# Patient Record
Sex: MALE | Race: BLACK OR AFRICAN AMERICAN | NOT HISPANIC OR LATINO | Employment: FULL TIME | URBAN - METROPOLITAN AREA
[De-identification: names, ages, dates, MRNs, and addresses within clinical notes are randomized per-mention and may not be internally consistent; named-entity substitution may affect disease eponyms.]

---

## 2013-05-31 LAB — HBA1C MFR BLD HPLC: 6.6 %

## 2017-02-16 LAB — HBA1C MFR BLD HPLC: 7.4 %

## 2017-06-15 LAB — HBA1C MFR BLD HPLC: 7.6 %

## 2018-01-10 NOTE — RESULT NOTES
Message    Colon polyp removed came back as tubular adenoma  Repeat colonoscopy in 3 years      LMOM for pt to call the office for results/reminder set  CF         Verified Results  (1) TISSUE EXAM [id number] 10:05AM Jordana Tsang     Test Name Result Flag Reference   LAB AP CASE REPORT (Report)     Surgical Pathology Report             Case: [id number]                   Authorizing Provider: Papo Adams MD     Collected:      07/19/2016 1005        Ordering Location:   [facility]    Received:      07/19/2016 [facility]                               Pathologist:      Jose Enrique Jefferson MD                             Specimen:  Polyp, Colorectal, hot snare hepatic flexure polyp   LAB AP FINAL DIAGNOSIS      A  Colon, hepatic flexure polyp, biopsy:  - Tubular adenoma, negative for high-grade dysplasia  Electronically signed by Jose Enrique Jefferson MD on 7/22/2016 at 2:48 PM   LAB AP SURGICAL ADDITIONAL INFORMATION (Report)     These tests were developed and their performance characteristics   determined by [facility] or [facility]  They may not be cleared or approved by the [place]  Food and   Drug Administration  The FDA has determined that such clearance or   approval is not necessary  These tests are used for clinical purposes  They should not be regarded as investigational or for research  This   laboratory has been approved by CLIA 88, designated as a high-complexity   laboratory and is qualified to perform these tests  Interpretation performed at [facility]   LAB AP GROSS DESCRIPTION (Report)     A  The specimen is received in formalin, labeled with the patient's name   and hospital number, and is designated hepatic flexure polyp  The   specimen consists of a single, rubbery, tan-brown tissue fragment   measuring up to 0 2 cm in greatest dimension  Entirely submitted   One cassette  Note: The estimated total formalin fixation time based upon information   provided by the submitting clinician and the standard processing schedule   is 18 5 hours      Sun Cochran

## 2018-01-16 NOTE — MISCELLANEOUS
Message  GI Reminder Recall [facility]:   Date: 04/04/2016   Dear Terrell Garcia:     Review of our records shows you are due for the following: Colonoscopy  Please call the following office to schedule your appointment:   [address] [facility], [address], [place], [address] [place] [address]  [phone]  We look forward to hearing from you! Sincerely,         Signatures   Electronically signed by :  Michael Ceja, ; Apr 4 2016  4:41PM EST                       (Author)

## 2018-09-07 ENCOUNTER — OFFICE VISIT (OUTPATIENT)
Dept: HEMATOLOGY ONCOLOGY | Facility: CLINIC | Age: 58
End: 2018-09-07
Payer: COMMERCIAL

## 2018-09-07 VITALS
DIASTOLIC BLOOD PRESSURE: 82 MMHG | HEIGHT: 66 IN | RESPIRATION RATE: 16 BRPM | BODY MASS INDEX: 32.78 KG/M2 | HEART RATE: 87 BPM | WEIGHT: 204 LBS | SYSTOLIC BLOOD PRESSURE: 144 MMHG | TEMPERATURE: 97.2 F | OXYGEN SATURATION: 96 %

## 2018-09-07 DIAGNOSIS — D3A.00 CARCINOID TUMOR: Primary | ICD-10-CM

## 2018-09-07 PROCEDURE — 99205 OFFICE O/P NEW HI 60 MIN: CPT | Performed by: INTERNAL MEDICINE

## 2018-09-07 RX ORDER — MULTIVIT-MIN/IRON/FOLIC ACID/K 18-600-40
1 CAPSULE ORAL DAILY
COMMUNITY

## 2018-09-07 RX ORDER — MULTIVITAMIN
1 TABLET ORAL DAILY
COMMUNITY

## 2018-09-07 RX ORDER — DIMENHYDRINATE 50 MG
2 TABLET ORAL DAILY
COMMUNITY

## 2018-09-07 RX ORDER — PEN NEEDLE, DIABETIC 31 GX5/16"
NEEDLE, DISPOSABLE MISCELLANEOUS
Refills: 5 | COMMUNITY
Start: 2018-07-28

## 2018-09-07 NOTE — PROGRESS NOTES
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  A Ki-67 was not given in the report  Regardless he has done well since then  In 2015 he had imaging which showed a questionable liver lesion which on repeat imaging was thought to possibly have represented a hemangioma as noted lesions were seen per the report  He has had a colonoscopy every 3 years and was getting a chromogranin A once a year with liver function tests  Denies any nausea denies any vomiting denies any diarrhea  He denies any evidence of carcinoid syndrome for symptoms as he has none  States occasionally every 3 months or so he sometimes has diarrhea usually just 1 episode at night which may be related to what he ate  Otherwise his 14 point review of systems today was negative  ROS: As stated in history of present illness otherwise her 14 point review of systems today was negative  Active Problems: Myasthenia Gravis, diabetes    Past Medical History:   Past Medical History:   Diagnosis Date    Carcinoid tumor of colon     CPAP (continuous positive airway pressure) dependence     Cyst of skin and subcutaneous tissue     back    Diabetes mellitus ([address] )     Hyperlipidemia     Hypertension     Myasthenia gravis ([address] )     Sleep apnea        Surgical History:   Past Surgical History:   Procedure Laterality Date    DENTAL SURGERY      NASAL POLYP EXCISION      WY COLONOSCOPY FLX DX W/COLLJ SPEC WHEN PFRMD N/A 7/19/2016    Procedure: COLONOSCOPY;  Surgeon: Leann Silva MD;  [facility];   Service: Gastroenterology Family History:  Noncontributory      Social History:   Social History     Social History    Marital status: /Civil Union     Spouse name: N/A    Number of children: N/A    Years of education: N/A     Occupational History    Not on file  Social History Main Topics    Smoking status: Never Smoker    Smokeless tobacco: Not on file    Alcohol use Yes      Comment: 1 per week    Drug use: Unknown    Sexual activity: Not on file     Other Topics Concern    Not on file     Social History Narrative    No narrative on file       Current Medications:   Current Outpatient Prescriptions   Medication Sig Dispense Refill    amLODIPine (NORVASC) 10 mg tablet Take 10 mg by mouth daily   Ascorbic Acid (VITAMIN C) 500 MG CAPS Take 1 tablet by mouth daily      aspirin 81 MG tablet Take 81 mg by mouth daily   atorvastatin (LIPITOR) 20 mg tablet Take 20 mg by mouth daily   B-D UF III MINI PEN NEEDLES 31G X 5 MM MISC USE 1 DAILY  5    Cholecalciferol ([employer]S VITAMIN D) 2000 units CAPS Take 2 tablets by mouth 2 (two) times a day      Cinnamon 500 MG TABS Take by mouth      coenzyme Q-10 100 MG capsule Take 2 capsules by mouth daily      glipiZIDE (GLUCOTROL) 10 mg tablet Take 10 mg by mouth 2 (two) times a day before meals   hydrochlorothiazide (HYDRODIURIL) 25 mg tablet Take 25 mg by mouth daily   insulin glargine (LANTUS) 100 units/mL subcutaneous injection Inject 60 Units under the skin daily Indications: 20 units 7/18 am         irbesartan (AVAPRO) 300 mg tablet Take 300 mg by mouth daily at bedtime   metFORMIN (GLUMETZA) 500 MG (MOD) 24 hr tablet Take 750 mg by mouth 2 (two) times a day with meals   metoprolol succinate (TOPROL-XL) 100 mg 24 hr tablet Take 100 mg by mouth daily   miglitol (GLYSET) 100 MG tablet Take 100 mg by mouth 3 (three) times a day with meals        Multiple Vitamin (MULTI-VITAMIN DAILY) TABS Take 1 tablet by mouth daily      pyridostigmine (MESTINON) 60 mg tablet Take 60 mg by mouth 3 (three) times a day  No current facility-administered medications for this visit  Allergies: No Known Allergies      Physical Exam:    Body surface area is 2 01 meters squared  Wt Readings from Last 3 Encounters:   09/07/18 92 5 kg (204 lb)   07/19/16 91 kg (200 lb 9 9 oz)   06/29/16 91 9 kg (202 lb 8 oz)        Temp Readings from Last 3 Encounters:   09/07/18 (!) 97 2 °F (36 2 °C) (Tympanic)   07/19/16 (!) 97 °F (36 1 °C)        BP Readings from Last 3 Encounters:   09/07/18 144/82   07/19/16 135/76   06/29/16 158/88         Pulse Readings from Last 3 Encounters:   09/07/18 87   07/19/16 93   06/29/16 78       Physical Exam     Constitutional   General appearance: No acute distress, well appearing and well nourished  Eyes   Conjunctiva and lids: No swelling, erythema or discharge  Pupils and irises: Equal, round and reactive to light  Ears, Nose, Mouth, and Throat   External inspection of ears and nose: Normal     Nasal mucosa, septum, and turbinates: Normal without edema or erythema  Oropharynx: Normal with no erythema, edema, exudate or lesions  Pulmonary   Respiratory effort: No increased work of breathing or signs of respiratory distress  Auscultation of lungs: Clear to auscultation  Cardiovascular   Palpation of heart: Normal PMI, no thrills  Auscultation of heart: Normal rate and rhythm, normal S1 and S2, without murmurs  Examination of extremities for edema and/or varicosities: Normal     Carotid pulses: Normal     Abdomen   Abdomen: Non-tender, no masses  Liver and spleen: No hepatomegaly or splenomegaly  Lymphatic   Palpation of lymph nodes in neck: No lymphadenopathy  Musculoskeletal   Gait and station: Normal     Digits and nails: Normal without clubbing or cyanosis      Inspection/palpation of joints, bones, and muscles: Normal     Skin   Skin and subcutaneous tissue: Normal without rashes or lesions  Neurologic   Cranial nerves: Cranial nerves 2-12 intact  Sensation: No sensory loss  Psychiatric   Orientation to person, place, and time: Normal     Mood and affect: Normal           Assessment/ Plan:    The patient is a pleasant 77-year-old male with a past medical history of carcinoid found in a rectal polyp in 2011  He has been CAROL since  I spent them is cured of this disease at this point  Because of the very small risk of metastatic disease she wishes to be followed once a year which I think is reasonable  Since he has no symptoms I am not ordering any imaging at this time  I'll see him back in a year  He will do blood work within the next week or 2 as his last blood work was a year ago  I will then see him back in a year with repeat blood work  All questions were answered  The patient agrees with the plan  Goals and Barriers:  Current Goal: Prolong Survival from Carcinoid Of the rectum  Barriers: None  Patient's Capacity to Self Care:  Patient able to self care  Portions of the record may have been created with voice recognition software   Occasional wrong word or "sound a like" substitutions may have occurred due to the inherent limitations of voice recognition software   Read the chart carefully and recognize, using context, where substitutions have occurred

## 2018-09-07 NOTE — LETTER
September 7, 2018     Heather Gonzalez MD  [address]  [address] [place] [address]    Patient: Evette Landeros [facility]   YOB: 1960   Date of Visit: 9/7/2018       Dear Dr Ryann Pollack: Thank you for referring Alvarez Price to me for evaluation  Below are my notes for this consultation  If you have questions, please do not hesitate to call me  I look forward to following your patient along with you  Sincerely,        Charu Daigle MD        CC: No Recipients  Charu Daigle MD  9/7/2018  3:04 PM  Sign at close encounter     Oncology Outpatient Consult Note  Anders Mcconnell 62 y o  male MRN: @ Encounter: [id number]        Date:  9/7/2018        CC:  Carcinoid tumor of the rectum  HPI:  Anders Mcconnell is seen for initial consultation 9/7/2018 regarding History of a carcinoid removed in 2011  Your colonoscopy at the time and was found to have a small polyp  Pathology from [facility] revealed carcinoid tumor  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  A Ki-67 was not given in the report  Regardless he has done well since then  In 2015 he had imaging which showed a questionable liver lesion which on repeat imaging was thought to possibly have represented a hemangioma as noted lesions were seen per the report  He has had a colonoscopy every 3 years and was getting a chromogranin A once a year with liver function tests  Denies any nausea denies any vomiting denies any diarrhea  He denies any evidence of carcinoid syndrome for symptoms as he has none  States occasionally every 3 months or so he sometimes has diarrhea usually just 1 episode at night which may be related to what he ate  Otherwise his 14 point review of systems today was negative  ROS: As stated in history of present illness otherwise her 14 point review of systems today was negative          Active Problems: Myasthenia Gravis, diabetes    Past Medical History:   Past Medical History:   Diagnosis Date    Carcinoid tumor of colon     CPAP (continuous positive airway pressure) dependence     Cyst of skin and subcutaneous tissue     back    Diabetes mellitus [address] [address]     Hyperlipidemia     Hypertension     Myasthenia gravis ([facility] [facility] )     Sleep apnea        Surgical History:   Past Surgical History:   Procedure Laterality Date    DENTAL SURGERY      NASAL POLYP EXCISION      WY COLONOSCOPY FLX DX W/COLLJ SPEC WHEN PFRMD N/A 7/19/2016    Procedure: COLONOSCOPY;  Surgeon: Tapan Leach MD;  [facility]; Service: Gastroenterology       Family History:  Noncontributory      Social History:   Social History     Social History    Marital status: /Civil Union     Spouse name: N/A    Number of children: N/A    Years of education: N/A     Occupational History    Not on file  Social History Main Topics    Smoking status: Never Smoker    Smokeless tobacco: Not on file    Alcohol use Yes      Comment: 1 per week    Drug use: Unknown    Sexual activity: Not on file     Other Topics Concern    Not on file     Social History Narrative    No narrative on file       Current Medications:   Current Outpatient Prescriptions   Medication Sig Dispense Refill    amLODIPine (NORVASC) 10 mg tablet Take 10 mg by mouth daily   Ascorbic Acid (VITAMIN C) 500 MG CAPS Take 1 tablet by mouth daily      aspirin 81 MG tablet Take 81 mg by mouth daily   atorvastatin (LIPITOR) 20 mg tablet Take 20 mg by mouth daily   B-D UF III MINI PEN NEEDLES 31G X 5 MM MISC USE 1 DAILY  5    Cholecalciferol ([employer]S VITAMIN D) 2000 units CAPS Take 2 tablets by mouth 2 (two) times a day      Cinnamon 500 MG TABS Take by mouth      coenzyme Q-10 100 MG capsule Take 2 capsules by mouth daily      glipiZIDE (GLUCOTROL) 10 mg tablet Take 10 mg by mouth 2 (two) times a day before meals   hydrochlorothiazide (HYDRODIURIL) 25 mg tablet Take 25 mg by mouth daily        insulin glargine (LANTUS) 100 units/mL subcutaneous injection Inject 60 Units under the skin daily Indications: 20 units 7/18 am         irbesartan (AVAPRO) 300 mg tablet Take 300 mg by mouth daily at bedtime   metFORMIN (GLUMETZA) 500 MG (MOD) 24 hr tablet Take 750 mg by mouth 2 (two) times a day with meals   metoprolol succinate (TOPROL-XL) 100 mg 24 hr tablet Take 100 mg by mouth daily   miglitol (GLYSET) 100 MG tablet Take 100 mg by mouth 3 (three) times a day with meals   Multiple Vitamin (MULTI-VITAMIN DAILY) TABS Take 1 tablet by mouth daily      pyridostigmine (MESTINON) 60 mg tablet Take 60 mg by mouth 3 (three) times a day  No current facility-administered medications for this visit  Allergies: No Known Allergies      Physical Exam:    Body surface area is 2 01 meters squared  Wt Readings from Last 3 Encounters:   09/07/18 92 5 kg (204 lb)   07/19/16 91 kg (200 lb 9 9 oz)   06/29/16 91 9 kg (202 lb 8 oz)        Temp Readings from Last 3 Encounters:   09/07/18 (!) 97 2 °F (36 2 °C) (Tympanic)   07/19/16 (!) 97 °F (36 1 °C)        BP Readings from Last 3 Encounters:   09/07/18 144/82   07/19/16 135/76   06/29/16 158/88         Pulse Readings from Last 3 Encounters:   09/07/18 87   07/19/16 93   06/29/16 78       Physical Exam     Constitutional   General appearance: No acute distress, well appearing and well nourished  Eyes   Conjunctiva and lids: No swelling, erythema or discharge  Pupils and irises: Equal, round and reactive to light  Ears, Nose, Mouth, and Throat   External inspection of ears and nose: Normal     Nasal mucosa, septum, and turbinates: Normal without edema or erythema  Oropharynx: Normal with no erythema, edema, exudate or lesions  Pulmonary   Respiratory effort: No increased work of breathing or signs of respiratory distress  Auscultation of lungs: Clear to auscultation  Cardiovascular   Palpation of heart: Normal PMI, no thrills      Auscultation of heart: Normal rate and rhythm, normal S1 and S2, without murmurs  Examination of extremities for edema and/or varicosities: Normal     Carotid pulses: Normal     Abdomen   Abdomen: Non-tender, no masses  Liver and spleen: No hepatomegaly or splenomegaly  Lymphatic   Palpation of lymph nodes in neck: No lymphadenopathy  Musculoskeletal   Gait and station: Normal     Digits and nails: Normal without clubbing or cyanosis  Inspection/palpation of joints, bones, and muscles: Normal     Skin   Skin and subcutaneous tissue: Normal without rashes or lesions  Neurologic   Cranial nerves: Cranial nerves 2-12 intact  Sensation: No sensory loss  Psychiatric   Orientation to person, place, and time: Normal     Mood and affect: Normal           Assessment/ Plan:    The patient is a pleasant 51-year-old male with a past medical history of carcinoid found in a rectal polyp in 2011  He has been CAROL since  I spent them is cured of this disease at this point  Because of the very small risk of metastatic disease she wishes to be followed once a year which I think is reasonable  Since he has no symptoms I am not ordering any imaging at this time  I'll see him back in a year  He will do blood work within the next week or 2 as his last blood work was a year ago  I will then see him back in a year with repeat blood work  All questions were answered  The patient agrees with the plan  Goals and Barriers:  Current Goal: Prolong Survival from Carcinoid Of the rectum  Barriers: None  Patient's Capacity to Self Care:  Patient able to self care  Portions of the record may have been created with voice recognition software   Occasional wrong word or "sound a like" substitutions may have occurred due to the inherent limitations of voice recognition software   Read the chart carefully and recognize, using context, where substitutions have occurred

## 2018-09-19 LAB
ALBUMIN SERPL-MCNC: 4.2 G/DL (ref 3.5–5.5)
ALBUMIN/GLOB SERPL: 1.6 {RATIO} (ref 1.2–2.2)
ALP SERPL-CCNC: 96 IU/L (ref 39–117)
ALT SERPL-CCNC: 14 IU/L (ref 0–44)
AMBIG ABBREV DEFAULT: NORMAL
AST SERPL-CCNC: 16 IU/L (ref 0–40)
BASOPHILS # BLD AUTO: 0 X10E3/UL (ref 0–0.2)
BASOPHILS NFR BLD AUTO: 0 %
BILIRUB SERPL-MCNC: 0.4 MG/DL (ref 0–1.2)
BUN SERPL-MCNC: 14 MG/DL (ref 6–24)
BUN/CREAT SERPL: 12 (ref 9–20)
CALCIUM SERPL-MCNC: 9.6 MG/DL (ref 8.7–10.2)
CGA SERPL-SCNC: 2 NMOL/L (ref 0–5)
CHLORIDE SERPL-SCNC: 101 MMOL/L (ref 96–106)
CO2 SERPL-SCNC: 27 MMOL/L (ref 20–29)
CREAT SERPL-MCNC: 1.16 MG/DL (ref 0.76–1.27)
EOSINOPHIL # BLD AUTO: 0.2 X10E3/UL (ref 0–0.4)
EOSINOPHIL NFR BLD AUTO: 3 %
ERYTHROCYTE [DISTWIDTH] IN BLOOD BY AUTOMATED COUNT: 14.1 % (ref 12.3–15.4)
GLOBULIN SER-MCNC: 2.7 G/DL (ref 1.5–4.5)
GLUCOSE SERPL-MCNC: 185 MG/DL (ref 65–99)
HCT VFR BLD AUTO: 38.5 % (ref 37.5–51)
HGB BLD-MCNC: 12.5 G/DL (ref 13–17.7)
IMM GRANULOCYTES # BLD: 0 X10E3/UL (ref 0–0.1)
IMM GRANULOCYTES NFR BLD: 0 %
LYMPHOCYTES # BLD AUTO: 1.4 X10E3/UL (ref 0.7–3.1)
LYMPHOCYTES NFR BLD AUTO: 28 %
MCH RBC QN AUTO: 25.5 PG (ref 26.6–33)
MCHC RBC AUTO-ENTMCNC: 32.5 G/DL (ref 31.5–35.7)
MCV RBC AUTO: 79 FL (ref 79–97)
MONOCYTES # BLD AUTO: 0.5 X10E3/UL (ref 0.1–0.9)
MONOCYTES NFR BLD AUTO: 9 %
NEUTROPHILS # BLD AUTO: 3.1 X10E3/UL (ref 1.4–7)
NEUTROPHILS NFR BLD AUTO: 60 %
PLATELET # BLD AUTO: 250 X10E3/UL (ref 150–379)
POTASSIUM SERPL-SCNC: 4.1 MMOL/L (ref 3.5–5.2)
PROT SERPL-MCNC: 6.9 G/DL (ref 6–8.5)
RBC # BLD AUTO: 4.9 X10E6/UL (ref 4.14–5.8)
SL AMB EGFR AFRICAN AMERICAN: 80 ML/MIN/1.73
SL AMB EGFR NON AFRICAN AMERICAN: 69 ML/MIN/1.73
SODIUM SERPL-SCNC: 143 MMOL/L (ref 134–144)
WBC # BLD AUTO: 5.2 X10E3/UL (ref 3.4–10.8)

## 2019-01-31 ENCOUNTER — TELEPHONE (OUTPATIENT)
Dept: GASTROENTEROLOGY | Facility: CLINIC | Age: 59
End: 2019-01-31

## 2019-01-31 NOTE — TELEPHONE ENCOUNTER
Ivet Baker patient      Karen Kennedy from dr neves office would like a call back to see when he is due for next colon appt    [phone]

## 2019-07-12 ENCOUNTER — TELEPHONE (OUTPATIENT)
Dept: GASTROENTEROLOGY | Facility: AMBULARY SURGERY CENTER | Age: 59
End: 2019-07-12

## 2019-07-12 ENCOUNTER — TELEPHONE (OUTPATIENT)
Dept: HEMATOLOGY ONCOLOGY | Facility: CLINIC | Age: 59
End: 2019-07-12

## 2019-07-12 NOTE — TELEPHONE ENCOUNTER
Patient called to reschedule appointment  Patient was rescheduled from 9/19 to 9/12 at 2 pm  Verbalized understanding appointment details   Call back [phone]

## 2019-09-09 LAB
ALBUMIN SERPL-MCNC: 4.8 G/DL (ref 3.5–5.5)
ALBUMIN/GLOB SERPL: 2.1 {RATIO} (ref 1.2–2.2)
ALP SERPL-CCNC: 62 IU/L (ref 39–117)
ALT SERPL-CCNC: 15 IU/L (ref 0–44)
AST SERPL-CCNC: 16 IU/L (ref 0–40)
BASOPHILS # BLD AUTO: 0 X10E3/UL (ref 0–0.2)
BASOPHILS NFR BLD AUTO: 0 %
BILIRUB SERPL-MCNC: 0.6 MG/DL (ref 0–1.2)
BUN SERPL-MCNC: 12 MG/DL (ref 6–24)
BUN/CREAT SERPL: 11 (ref 9–20)
CALCIUM SERPL-MCNC: 9.9 MG/DL (ref 8.7–10.2)
CGA SERPL-SCNC: 2 NMOL/L (ref 0–5)
CHLORIDE SERPL-SCNC: 100 MMOL/L (ref 96–106)
CO2 SERPL-SCNC: 29 MMOL/L (ref 20–29)
CREAT SERPL-MCNC: 1.12 MG/DL (ref 0.76–1.27)
EOSINOPHIL # BLD AUTO: 0.1 X10E3/UL (ref 0–0.4)
EOSINOPHIL NFR BLD AUTO: 2 %
ERYTHROCYTE [DISTWIDTH] IN BLOOD BY AUTOMATED COUNT: 14.8 % (ref 12.3–15.4)
GLOBULIN SER-MCNC: 2.3 G/DL (ref 1.5–4.5)
GLUCOSE SERPL-MCNC: 85 MG/DL (ref 65–99)
HCT VFR BLD AUTO: 37.3 % (ref 37.5–51)
HGB BLD-MCNC: 12.2 G/DL (ref 13–17.7)
IMM GRANULOCYTES # BLD: 0 X10E3/UL (ref 0–0.1)
IMM GRANULOCYTES NFR BLD: 0 %
LYMPHOCYTES # BLD AUTO: 1.2 X10E3/UL (ref 0.7–3.1)
LYMPHOCYTES NFR BLD AUTO: 21 %
MCH RBC QN AUTO: 24.6 PG (ref 26.6–33)
MCHC RBC AUTO-ENTMCNC: 32.7 G/DL (ref 31.5–35.7)
MCV RBC AUTO: 75 FL (ref 79–97)
MONOCYTES # BLD AUTO: 0.4 X10E3/UL (ref 0.1–0.9)
MONOCYTES NFR BLD AUTO: 8 %
NEUTROPHILS # BLD AUTO: 3.9 X10E3/UL (ref 1.4–7)
NEUTROPHILS NFR BLD AUTO: 69 %
PLATELET # BLD AUTO: 266 X10E3/UL (ref 150–450)
POTASSIUM SERPL-SCNC: 3.9 MMOL/L (ref 3.5–5.2)
PROT SERPL-MCNC: 7.1 G/DL (ref 6–8.5)
RBC # BLD AUTO: 4.95 X10E6/UL (ref 4.14–5.8)
SL AMB EGFR AFRICAN AMERICAN: 83 ML/MIN/1.73
SL AMB EGFR NON AFRICAN AMERICAN: 72 ML/MIN/1.73
SODIUM SERPL-SCNC: 146 MMOL/L (ref 134–144)
WBC # BLD AUTO: 5.6 X10E3/UL (ref 3.4–10.8)

## 2019-09-10 ENCOUNTER — TELEPHONE (OUTPATIENT)
Dept: HEMATOLOGY ONCOLOGY | Facility: CLINIC | Age: 59
End: 2019-09-10

## 2019-09-10 NOTE — TELEPHONE ENCOUNTER
PT CALLED FOR DIRECTIONS TO GIORGI FOR HIS APT WITH DR WRAY   BUT I DON'T SEE AN APT IN HIS CHART   LOOKS LIKE IT WAS CANCELED BY YOU    CAN YOU PLEASE CHECK AND GIVE PT A CALL IS NECESSARY    THANKS

## 2019-09-12 ENCOUNTER — OFFICE VISIT (OUTPATIENT)
Dept: HEMATOLOGY ONCOLOGY | Facility: CLINIC | Age: 59
End: 2019-09-12
Payer: COMMERCIAL

## 2019-09-12 VITALS
BODY MASS INDEX: 34.16 KG/M2 | HEART RATE: 76 BPM | DIASTOLIC BLOOD PRESSURE: 68 MMHG | SYSTOLIC BLOOD PRESSURE: 138 MMHG | OXYGEN SATURATION: 98 % | HEIGHT: 65 IN | RESPIRATION RATE: 16 BRPM | TEMPERATURE: 96.1 F | WEIGHT: 205 LBS

## 2019-09-12 DIAGNOSIS — D3A.00 CARCINOID TUMOR: ICD-10-CM

## 2019-09-12 DIAGNOSIS — D50.9 MICROCYTIC ANEMIA: Primary | ICD-10-CM

## 2019-09-12 PROCEDURE — 99213 OFFICE O/P EST LOW 20 MIN: CPT | Performed by: NURSE PRACTITIONER

## 2019-09-12 RX ORDER — METFORMIN HYDROCHLORIDE EXTENDED-RELEASE TABLETS 1000 MG/1
1000 TABLET, FILM COATED, EXTENDED RELEASE ORAL 2 TIMES DAILY
Refills: 2 | COMMUNITY
Start: 2019-08-26 | End: 2021-02-22 | Stop reason: CLARIF

## 2019-09-12 RX ORDER — SITAGLIPTIN 100 MG/1
100 TABLET, FILM COATED ORAL DAILY
Refills: 2 | COMMUNITY
Start: 2019-06-26

## 2019-09-12 RX ORDER — ATORVASTATIN CALCIUM 40 MG/1
40 TABLET, FILM COATED ORAL
Refills: 4 | COMMUNITY
Start: 2019-07-24

## 2019-09-12 RX ORDER — ICOSAPENT ETHYL 1000 MG/1
CAPSULE ORAL
Refills: 2 | COMMUNITY
Start: 2019-06-26 | End: 2022-07-05

## 2019-09-12 NOTE — PROGRESS NOTES
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  A Ki-67 was not given in the report  Regardless he has done well since then  In 2015 he had imaging which showed a questionable liver lesion which on repeat imaging was thought to possibly have represented a hemangioma as noted lesions were seen per the report  He has had a colonoscopy every 3 years and was getting a chromogranin A once a year with liver function tests  He has been CAROL since  Current Hematologic/ Oncologic Treatment:    Observation  Oral iron    Interval History:    The patient returns for yearly follow-up visit  He has been CAROL since 2011  At this time his blood work remains stable  Chromogranin a is 2  WBC 5 6, hemoglobin 12 2, MCV 75, platelets 354, ANC 3 9  CMP is within normal limits  Overall he states that he is feeling well  Denies fatigue, chest pain, shortness of breath, nausea, vomiting, diarrhea, bleeding/bruising, and fevers/infection  Denies any bloody stools  Test Results:    Imaging: No results found      Labs:   Lab Results   Component Value Date    WBC 5 6 09/05/2019    HGB 12 2 (L) 09/05/2019    HCT 37 3 (L) 09/05/2019    MCV 75 (L) 09/05/2019     09/05/2019     Lab Results   Component Value Date    K 3 9 09/05/2019     09/05/2019    CO2 29 09/05/2019    BUN 12 09/05/2019    CREATININE 1 12 09/05/2019    AST 16 09/05/2019    ALT 15 09/05/2019         No results found for: SPEP, UPEP    No results found for: PSA    No results found for: CEA    No results found for:     No results found for: AFP    No results found for: IRON, TIBC, FERRITIN    No results found for: [id number]      ROS: As stated in the history of present illness otherwise his 14 point review of systems today was negative  Active Problems: There is no problem list on file for this patient  Past Medical History:   Past Medical History:   Diagnosis Date    Carcinoid tumor of colon     CPAP (continuous positive airway pressure) dependence     Cyst of skin and subcutaneous tissue     back    Diabetes mellitus [facility] [address]     Hyperlipidemia     Hypertension     Myasthenia gravis ([facility] [address] [facility]     Sleep apnea        Surgical History:   Past Surgical History:   Procedure Laterality Date    DENTAL SURGERY      NASAL POLYP EXCISION      [place] COLONOSCOPY FLX DX W/COLLJ SPEC WHEN PFRMD N/A 7/19/2016    Procedure: COLONOSCOPY;  Surgeon: Jean Carlos Duckwotrh MD;  Location: [facility]; Service: Gastroenterology       Family History:  No family history on file  Cancer-related family history is not on file      Social History:   Social History     Socioeconomic History    Marital status: /Civil Union     Spouse name: Not on file    Number of children: Not on file    Years of education: Not on file    Highest education level: Not on file   Occupational History    Not on file   Social Needs    Financial resource strain: Not on file    Food insecurity:     Worry: Not on file     Inability: Not on file    Transportation needs:     Medical: Not on file     Non-medical: Not on file   Tobacco Use    Smoking status: Never Smoker   Substance and Sexual Activity    Alcohol use: Yes     Comment: 1 per week    Drug use: Not on file    Sexual activity: Not on file   Lifestyle    Physical activity:     Days per week: Not on file     Minutes per session: Not on file    Stress: Not on file   Relationships    Social connections:     Talks on phone: Not on file     Gets together: Not on file     Attends Confucianist service: Not on file     Active member of club or organization: Not on file     Attends meetings of clubs or organizations: Not on file     Relationship status: Not on file    Intimate partner violence:     Fear of current or ex partner: Not on file     Emotionally abused: Not on file     Physically abused: Not on file     Forced sexual activity: Not on file   Other Topics Concern    Not on file   Social History Narrative    Not on file       Current Medications:   Current Outpatient Medications   Medication Sig Dispense Refill    amLODIPine (NORVASC) 10 mg tablet Take 10 mg by mouth daily   Ascorbic Acid (VITAMIN C) 500 MG CAPS Take 1 tablet by mouth daily      aspirin 81 MG tablet Take 81 mg by mouth daily   atorvastatin (LIPITOR) 20 mg tablet Take 20 mg by mouth daily   B-D UF III MINI PEN NEEDLES 31G X 5 MM MISC USE 1 DAILY  5    Cholecalciferol ([employer] VITAMIN D) 2000 units CAPS Take 2 tablets by mouth 2 (two) times a day      Cinnamon 500 MG TABS Take by mouth      coenzyme Q-10 100 MG capsule Take 2 capsules by mouth daily      glipiZIDE (GLUCOTROL) 10 mg tablet Take 10 mg by mouth 2 (two) times a day before meals   hydrochlorothiazide (HYDRODIURIL) 25 mg tablet Take 25 mg by mouth daily   insulin glargine (LANTUS) 100 units/mL subcutaneous injection Inject 60 Units under the skin daily Indications: 20 units 7/18 am         irbesartan (AVAPRO) 300 mg tablet Take 300 mg by mouth daily at bedtime   metFORMIN (GLUMETZA) 500 MG (MOD) 24 hr tablet Take 750 mg by mouth 2 (two) times a day with meals   metoprolol succinate (TOPROL-XL) 100 mg 24 hr tablet Take 100 mg by mouth daily   miglitol (GLYSET) 100 MG tablet Take 100 mg by mouth 3 (three) times a day with meals   Multiple Vitamin (MULTI-VITAMIN DAILY) TABS Take 1 tablet by mouth daily      pyridostigmine (MESTINON) 60 mg tablet Take 60 mg by mouth 3 (three) times a day         No current facility-administered medications for this visit  Allergies: No Known Allergies    Physical Exam:    There is no height or weight on file to calculate BSA  Wt Readings from Last 3 Encounters:   09/07/18 92 5 kg (204 lb)   07/19/16 91 kg (200 lb 9 9 oz)   06/29/16 91 9 kg (202 lb 8 oz)        Temp Readings from Last 3 Encounters:   09/07/18 (!) 97 2 °F (36 2 °C) (Tympanic)   07/19/16 (!) 97 °F (36 1 °C)        BP Readings from Last 3 Encounters:   09/07/18 144/82   07/19/16 135/76   06/29/16 158/88         Pulse Readings from Last 3 Encounters:   09/07/18 87   07/19/16 93   06/29/16 78     @LASTSAO2(3)@      Physical Exam     Constitutional   General appearance: No acute distress, well appearing and well nourished  Eyes   Conjunctiva and lids: No swelling, erythema or discharge  Pupils and irises: Equal, round and reactive to light  Ears, Nose, Mouth, and Throat   External inspection of ears and nose: Normal     Nasal mucosa, septum, and turbinates: Normal without edema or erythema  Oropharynx: Normal with no erythema, edema, exudate or lesions  Pulmonary   Respiratory effort: No increased work of breathing or signs of respiratory distress  Auscultation of lungs: Clear to auscultation  Cardiovascular   Palpation of heart: Normal PMI, no thrills  Auscultation of heart: Normal rate and rhythm, normal S1 and S2, without murmurs  Examination of extremities for edema and/or varicosities: Normal     Carotid pulses: Normal     Abdomen   Abdomen: Non-tender, no masses  Liver and spleen: No hepatomegaly or splenomegaly  Lymphatic   Palpation of lymph nodes in neck: No lymphadenopathy  Musculoskeletal   Gait and station: Normal     Digits and nails: Normal without clubbing or cyanosis  Inspection/palpation of joints, bones, and muscles: Normal     Skin   Skin and subcutaneous tissue: Normal without rashes or lesions  Neurologic   Cranial nerves: Cranial nerves 2-12 intact      Sensation: No sensory loss     Psychiatric   Orientation to person, place, and time: Normal     Mood and affect: Normal         Assessment / Plan:    The patient is a 40-year-old male with past medical history of carcinoid tumor that was removed in 2011  He has shown no evidence of disease since then  However at this time he does appear to have microcytic anemia  I will order iron studies to be done now  He also agreed to start taking low-dose oral iron  We did discuss the option of IV iron infusions depending on how low his iron is however he was not interested in this at this time and prefers to take oral iron  I think this is acceptable as he is asymptomatic  He will have iron studies completed now and then he will follow up with us in 6 months and at that time he will have iron studies completed again in addition to CBC, CMP, chromogranin a  His chromogranin a remained stable at 2  He is scheduled for his next colonoscopy in 2 weeks  The patient is in agreement with the plan  Goals and Barriers:  Current Goal:  Prolong Survival from macrocytic anemia and carcinoid tumor of the rectum  Barriers: None  Patient's Capacity to Self Care:  Patient able to self care  ECOG 0    Portions of the record may have been created with voice recognition software   Occasional wrong word or "sound a like" substitutions may have occurred due to the inherent limitations of voice recognition software   Read the chart carefully and recognize, using context, where substitutions have occurred

## 2019-09-13 ENCOUNTER — ANESTHESIA EVENT (OUTPATIENT)
Dept: GASTROENTEROLOGY | Facility: AMBULARY SURGERY CENTER | Age: 59
End: 2019-09-13

## 2019-09-26 NOTE — ANESTHESIA PREPROCEDURE EVALUATION
Review of Systems/Medical History  Patient summary reviewed  Chart reviewed  No history of anesthetic complications     Cardiovascular  Exercise tolerance (METS): >4,  Hyperlipidemia, Hypertension , No GARRISON,    Pulmonary  No recent URI , Sleep apnea CPAP,        GI/Hepatic    No GERD ,             Endo/Other  Diabetes well controlled type 2 ,      GYN       Hematology   Musculoskeletal       Neurology    Neuromuscular disease (diagnosed 2014, took am pyridostigmine  ) , myasthenia gravis,    Psychology           Physical Exam    Airway    Mallampati score: II  TM Distance: >3 FB  Neck ROM: full     Dental       Cardiovascular  Rhythm: regular, Rate: normal,     Pulmonary  Breath sounds clear to auscultation,     Other Findings        Anesthesia Plan  ASA Score- 3     Anesthesia Type- IV sedation with anesthesia with ASA Monitors  Additional Monitors:   Airway Plan:         Plan Factors-    Induction- intravenous  Postoperative Plan-     Informed Consent- Anesthetic plan and risks discussed with patient  I personally reviewed this patient with the CRNA  Discussed and agreed on the Anesthesia Plan with the CRNA  Yunior Luciano

## 2019-09-27 ENCOUNTER — HOSPITAL ENCOUNTER (OUTPATIENT)
Dept: GASTROENTEROLOGY | Facility: AMBULARY SURGERY CENTER | Age: 59
Setting detail: OUTPATIENT SURGERY
Discharge: HOME | End: 2019-09-27
Attending: INTERNAL MEDICINE | Admitting: INTERNAL MEDICINE
Payer: COMMERCIAL

## 2019-09-27 ENCOUNTER — ANESTHESIA (OUTPATIENT)
Dept: GASTROENTEROLOGY | Facility: AMBULARY SURGERY CENTER | Age: 59
End: 2019-09-27

## 2019-09-27 VITALS
DIASTOLIC BLOOD PRESSURE: 69 MMHG | HEART RATE: 76 BPM | WEIGHT: 205 LBS | HEIGHT: 65 IN | SYSTOLIC BLOOD PRESSURE: 118 MMHG | OXYGEN SATURATION: 70 % | BODY MASS INDEX: 34.16 KG/M2 | TEMPERATURE: 97.1 F | RESPIRATION RATE: 18 BRPM

## 2019-09-27 DIAGNOSIS — D64.9 ANEMIA, UNSPECIFIED TYPE: ICD-10-CM

## 2019-09-27 DIAGNOSIS — Z86.010 HX OF COLONIC POLYPS: ICD-10-CM

## 2019-09-27 PROCEDURE — 43235 EGD DIAGNOSTIC BRUSH WASH: CPT | Performed by: INTERNAL MEDICINE

## 2019-09-27 PROCEDURE — 88305 TISSUE EXAM BY PATHOLOGIST: CPT | Performed by: PATHOLOGY

## 2019-09-27 PROCEDURE — NC001 PR NO CHARGE: Performed by: INTERNAL MEDICINE

## 2019-09-27 PROCEDURE — 45385 COLONOSCOPY W/LESION REMOVAL: CPT | Performed by: INTERNAL MEDICINE

## 2019-09-27 RX ORDER — PROPOFOL 10 MG/ML
INJECTION, EMULSION INTRAVENOUS AS NEEDED
Status: DISCONTINUED | OUTPATIENT
Start: 2019-09-27 | End: 2019-09-27 | Stop reason: SURG

## 2019-09-27 RX ORDER — LIDOCAINE HYDROCHLORIDE 10 MG/ML
INJECTION, SOLUTION EPIDURAL; INFILTRATION; INTRACAUDAL; PERINEURAL AS NEEDED
Status: DISCONTINUED | OUTPATIENT
Start: 2019-09-27 | End: 2019-09-27 | Stop reason: SURG

## 2019-09-27 RX ORDER — SODIUM CHLORIDE, SODIUM LACTATE, POTASSIUM CHLORIDE, CALCIUM CHLORIDE 600; 310; 30; 20 MG/100ML; MG/100ML; MG/100ML; MG/100ML
125 INJECTION, SOLUTION INTRAVENOUS CONTINUOUS
Status: DISCONTINUED | OUTPATIENT
Start: 2019-09-27 | End: 2019-10-01 | Stop reason: HOSPADM

## 2019-09-27 RX ORDER — TRIAMTERENE AND HYDROCHLOROTHIAZIDE 37.5; 25 MG/1; MG/1
1 TABLET ORAL DAILY
COMMUNITY
End: 2021-06-02 | Stop reason: SDUPTHER

## 2019-09-27 RX ORDER — LIDOCAINE HYDROCHLORIDE 10 MG/ML
0.5 INJECTION, SOLUTION EPIDURAL; INFILTRATION; INTRACAUDAL; PERINEURAL ONCE AS NEEDED
Status: DISCONTINUED | OUTPATIENT
Start: 2019-09-27 | End: 2019-10-01 | Stop reason: HOSPADM

## 2019-09-27 RX ORDER — GLIPIZIDE 10 MG/1
5 TABLET, FILM COATED, EXTENDED RELEASE ORAL DAILY
COMMUNITY

## 2019-09-27 RX ORDER — METOPROLOL SUCCINATE 100 MG/1
100 TABLET, EXTENDED RELEASE ORAL DAILY
COMMUNITY
End: 2020-08-12

## 2019-09-27 RX ADMIN — PROPOFOL 40 MG: 10 INJECTION, EMULSION INTRAVENOUS at 08:34

## 2019-09-27 RX ADMIN — PROPOFOL 40 MG: 10 INJECTION, EMULSION INTRAVENOUS at 08:38

## 2019-09-27 RX ADMIN — PROPOFOL 40 MG: 10 INJECTION, EMULSION INTRAVENOUS at 08:42

## 2019-09-27 RX ADMIN — PROPOFOL 40 MG: 10 INJECTION, EMULSION INTRAVENOUS at 08:46

## 2019-09-27 RX ADMIN — SODIUM CHLORIDE, SODIUM LACTATE, POTASSIUM CHLORIDE, AND CALCIUM CHLORIDE 125 ML/HR: .6; .31; .03; .02 INJECTION, SOLUTION INTRAVENOUS at 07:54

## 2019-09-27 RX ADMIN — LIDOCAINE HYDROCHLORIDE 30 MG: 10 INJECTION, SOLUTION EPIDURAL; INFILTRATION; INTRACAUDAL; PERINEURAL at 08:28

## 2019-09-27 RX ADMIN — PROPOFOL 80 MG: 10 INJECTION, EMULSION INTRAVENOUS at 08:28

## 2019-09-27 RX ADMIN — PROPOFOL 50 MG: 10 INJECTION, EMULSION INTRAVENOUS at 08:30

## 2019-09-27 RX ADMIN — Medication 40 MG: at 08:45

## 2019-09-27 NOTE — ANESTHESIA POSTPROCEDURE EVALUATION
Post-Op Assessment Note    CV Status:  Stable  Pain Score: 0    Pain management: adequate     Mental Status:  Awake   Hydration Status:  Euvolemic   PONV Controlled:  Controlled   Airway Patency:  Patent   Post Op Vitals Reviewed: Yes      Staff: CRNA           /66 (09/27/19 0852)    Temp (!) 97 1 °F (36 2 °C) (09/27/19 0852)    Pulse 77 (09/27/19 0852)   Resp 18 (09/27/19 0852)    SpO2   95%

## 2019-09-27 NOTE — H&P
History and Physical -  [facility]  Yoly Ball 61 y o  male MRN: [id number]        HPI:  59-year-old male with history of carcinoid tumor of the rectum, colon polyps was noted to be slightly anemic, microcytic recently  He was seen by Hematology  Regular bowel movements and denies any blood or mucus in the stool  Historical Information   Past Medical History:   Diagnosis Date    Carcinoid tumor of colon     Colon polyp     CPAP (continuous positive airway pressure) dependence     Cyst of skin and subcutaneous tissue     back    Diabetes mellitus ([facility] [address]     Hyperlipidemia     Hypertension     [facility]asthenia gravis [facility] [address] [facility]     Sleep apnea      Past Surgical History:   Procedure Laterality Date    DENTAL SURGERY      NASAL POLYP EXCISION      [place] COLONOSCOPY FLX DX W/COLLJ SPEC WHEN PFRMD N/A 7/19/2016    Procedure: COLONOSCOPY;  Surgeon: Felipa Magaña MD;  Location[facility]; Service: Gastroenterology     Social History   Social History     Substance and Sexual Activity   Alcohol Use Yes    Frequency: 2-4 times a month    Comment: 1 per week     Social History     Substance and Sexual Activity   Drug Use Never     Social History     Tobacco Use   Smoking Status Never Smoker   Smokeless Tobacco Never Used     History reviewed  No pertinent family history  Meds/Allergies       (Not in a hospital admission)    No Known Allergies    Objective     Blood pressure 157/75, pulse 73, temperature (!) 97 4 °F (36 3 °C), temperature source Temporal, resp  rate 18, height 5' 5" (1 651 m), weight 93 kg (205 lb), SpO2 97 %      PHYSICAL EXAM:    Gen: NAD  CV: S1 & S2 normal, RRR  CHEST: Clear to auscultate  ABD: soft, NT/ND, good bowel sounds  EXT: no edema    ASSESSMENT:     Microcytic anemia, history of colon polyps, carcinoid tumor    PLAN:    EGD and colonoscopy

## 2019-10-02 ENCOUNTER — TELEPHONE (OUTPATIENT)
Dept: GASTROENTEROLOGY | Facility: AMBULARY SURGERY CENTER | Age: 59
End: 2019-10-02

## 2019-10-02 NOTE — TELEPHONE ENCOUNTER
----- Message from Mahad Hooper MD sent at 10/1/2019  6:27 PM EDT -----  Small polyp removed came back as pre cancerous tubular adenoma    Repeat colonoscopy in 5 years

## 2020-03-02 LAB
ALBUMIN SERPL-MCNC: 4.9 G/DL (ref 3.8–4.9)
ALBUMIN/GLOB SERPL: 2 {RATIO} (ref 1.2–2.2)
ALP SERPL-CCNC: 66 IU/L (ref 39–117)
ALT SERPL-CCNC: 20 IU/L (ref 0–44)
AST SERPL-CCNC: 20 IU/L (ref 0–40)
BASOPHILS # BLD AUTO: 0 X10E3/UL (ref 0–0.2)
BASOPHILS NFR BLD AUTO: 1 %
BILIRUB SERPL-MCNC: 0.7 MG/DL (ref 0–1.2)
BUN SERPL-MCNC: 13 MG/DL (ref 6–24)
BUN/CREAT SERPL: 12 (ref 9–20)
CALCIUM SERPL-MCNC: 10.1 MG/DL (ref 8.7–10.2)
CGA SERPL-MCNC: 80.4 NG/ML (ref 0–101.8)
CHLORIDE SERPL-SCNC: 100 MMOL/L (ref 96–106)
CO2 SERPL-SCNC: 24 MMOL/L (ref 20–29)
CREAT SERPL-MCNC: 1.05 MG/DL (ref 0.76–1.27)
EOSINOPHIL # BLD AUTO: 0.2 X10E3/UL (ref 0–0.4)
EOSINOPHIL NFR BLD AUTO: 2 %
ERYTHROCYTE [DISTWIDTH] IN BLOOD BY AUTOMATED COUNT: 13.6 % (ref 11.6–15.4)
FERRITIN SERPL-MCNC: 59 NG/ML (ref 30–400)
GLOBULIN SER-MCNC: 2.5 G/DL (ref 1.5–4.5)
GLUCOSE SERPL-MCNC: 100 MG/DL (ref 65–99)
HCT VFR BLD AUTO: 39.5 % (ref 37.5–51)
HGB BLD-MCNC: 13.6 G/DL (ref 13–17.7)
IMM GRANULOCYTES # BLD: 0 X10E3/UL (ref 0–0.1)
IMM GRANULOCYTES NFR BLD: 0 %
IRON SATN MFR SERPL: 15 % (ref 15–55)
IRON SERPL-MCNC: 54 UG/DL (ref 38–169)
LYMPHOCYTES # BLD AUTO: 1.3 X10E3/UL (ref 0.7–3.1)
LYMPHOCYTES NFR BLD AUTO: 21 %
MCH RBC QN AUTO: 26.7 PG (ref 26.6–33)
MCHC RBC AUTO-ENTMCNC: 34.4 G/DL (ref 31.5–35.7)
MCV RBC AUTO: 78 FL (ref 79–97)
METHYLMALONATE SERPL-SCNC: 88 NMOL/L (ref 0–378)
MONOCYTES # BLD AUTO: 0.4 X10E3/UL (ref 0.1–0.9)
MONOCYTES NFR BLD AUTO: 7 %
NEUTROPHILS # BLD AUTO: 4.4 X10E3/UL (ref 1.4–7)
NEUTROPHILS NFR BLD AUTO: 69 %
PLATELET # BLD AUTO: 276 X10E3/UL (ref 150–450)
POTASSIUM SERPL-SCNC: 3.7 MMOL/L (ref 3.5–5.2)
PROT SERPL-MCNC: 7.4 G/DL (ref 6–8.5)
RBC # BLD AUTO: 5.1 X10E6/UL (ref 4.14–5.8)
SL AMB DISCLAIMER: NORMAL
SL AMB EGFR AFRICAN AMERICAN: 89 ML/MIN/1.73
SL AMB EGFR NON AFRICAN AMERICAN: 77 ML/MIN/1.73
SODIUM SERPL-SCNC: 147 MMOL/L (ref 134–144)
TIBC SERPL-MCNC: 360 UG/DL (ref 250–450)
UIBC SERPL-MCNC: 306 UG/DL (ref 111–343)
WBC # BLD AUTO: 6.3 X10E3/UL (ref 3.4–10.8)

## 2020-03-18 ENCOUNTER — TELEPHONE (OUTPATIENT)
Dept: HEMATOLOGY ONCOLOGY | Facility: HOSPITAL | Age: 60
End: 2020-03-18

## 2020-03-19 ENCOUNTER — OFFICE VISIT (OUTPATIENT)
Dept: HEMATOLOGY ONCOLOGY | Facility: CLINIC | Age: 60
End: 2020-03-19
Payer: COMMERCIAL

## 2020-03-19 VITALS
WEIGHT: 210 LBS | HEART RATE: 94 BPM | DIASTOLIC BLOOD PRESSURE: 82 MMHG | HEIGHT: 65 IN | TEMPERATURE: 97.5 F | SYSTOLIC BLOOD PRESSURE: 148 MMHG | OXYGEN SATURATION: 94 % | RESPIRATION RATE: 16 BRPM | BODY MASS INDEX: 34.99 KG/M2

## 2020-03-19 DIAGNOSIS — D50.9 MICROCYTIC ANEMIA: ICD-10-CM

## 2020-03-19 DIAGNOSIS — D3A.029 CARCINOID TUMOR OF LARGE INTESTINE, UNSPECIFIED LOCATION, UNSPECIFIED WHETHER MALIGNANT: Primary | ICD-10-CM

## 2020-03-19 PROCEDURE — 99214 OFFICE O/P EST MOD 30 MIN: CPT | Performed by: INTERNAL MEDICINE

## 2020-03-19 RX ORDER — OLMESARTAN MEDOXOMIL 40 MG/1
40 TABLET ORAL DAILY
COMMUNITY
Start: 2020-01-27 | End: 2020-07-23

## 2020-03-19 NOTE — PROGRESS NOTES
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  A Ki-67 was not given in the report  Regardless he has done well since then  In 2015 he had imaging which showed a questionable liver lesion which on repeat imaging was thought to possibly have represented a hemangioma as noted lesions were seen per the report  He has had a colonoscopy every 3 years and was getting a chromogranin A once a year with liver function tests  He has been CAROL since  Previous Hematologic/ Oncologic History:      Observation  Oral iron    Current Hematologic/ Oncologic Treatment:      Oral iron    Interval History:      The patient returns for follow-up visit  He states he is at baseline  He is up-to-date on his colonoscopies  He is taking oral iron with no issues  His chromogranin a has gone up so we will do a scan to make sure he has no evidence of progression  As far symptoms are concerned he is at baseline  Denies any nausea denies any vomiting denies any diarrhea  The rest of his 14 point review of systems today was negative  Test Results:    Imaging: No results found      Labs:   Lab Results   Component Value Date    WBC 6 3 02/24/2020    HGB 13 6 02/24/2020    HCT 39 5 02/24/2020    MCV 78 (L) 02/24/2020     02/24/2020     Lab Results   Component Value Date    K 3 7 02/24/2020     02/24/2020    CO2 24 02/24/2020    BUN 13 02/24/2020    CREATININE 1 05 02/24/2020    AST 20 02/24/2020    ALT 20 02/24/2020       Lab Results   Component Value Date    IRON 54 02/24/2020 [facility] 360 02/24/2020    FERRITIN 59 02/24/2020   ROS: As stated in the history of present illness otherwise his 14 point review of systems today was negative  Active Problems: There is no problem list on file for this patient  Past Medical History:   Past Medical History:   Diagnosis Date    Carcinoid tumor of colon     Colon polyp     CPAP (continuous positive airway pressure) dependence     Cyst of skin and subcutaneous tissue     back    Diabetes mellitus ([address]     Hyperlipidemia     Hypertension     [facility] [address]     Sleep apnea        Surgical History:   Past Surgical History:   Procedure Laterality Date    DENTAL SURGERY      NASAL POLYP EXCISION      [place] COLONOSCOPY FLX DX W/COLLJ SPEC WHEN PFRMD N/A 7/19/2016    Procedure: COLONOSCOPY;  Surgeon: Jenn Bernal MD;  [facility]; Service: Gastroenterology       Family History:  No family history on file  Cancer-related family history is not on file      Social History:   Social History     Socioeconomic History    Marital status: /Civil Union     Spouse name: Not on file    Number of children: Not on file    Years of education: Not on file    Highest education level: Not on file   Occupational History    Not on file   Social Needs    Financial resource strain: Not on file    Food insecurity:     Worry: Not on file     Inability: Not on file    Transportation needs:     Medical: Not on file     Non-medical: Not on file   Tobacco Use    Smoking status: Never Smoker    Smokeless tobacco: Never Used   Substance and Sexual Activity    Alcohol use: Yes     Frequency: 2-4 times a month     Comment: 1 per week    Drug use: Never    Sexual activity: Not on file   Lifestyle    Physical activity:     Days per week: Not on file     Minutes per session: Not on file    Stress: Not on file   Relationships    Social connections:     Talks on phone: Not on file     Gets together: Not on file     Attends Gnosticist service: Not on file     Active member of club or organization: Not on file     Attends meetings of clubs or organizations: Not on file     Relationship status: Not on file    Intimate partner violence:     Fear of current or ex partner: Not on file     Emotionally abused: Not on file     Physically abused: Not on file     Forced sexual activity: Not on file   Other Topics Concern    Not on file   Social History Narrative    Not on file       Current Medications:   Current Outpatient Medications   Medication Sig Dispense Refill    amLODIPine (NORVASC) 10 mg tablet Take 10 mg by mouth daily   Ascorbic Acid (VITAMIN C) 500 MG CAPS Take 1 tablet by mouth daily      aspirin 81 MG tablet Take 81 mg by mouth daily   atorvastatin (LIPITOR) 40 mg tablet Take 40 mg by mouth daily at bedtime  4    B-D UF III MINI PEN NEEDLES 31G X 5 MM MISC USE 1 DAILY  5    Cholecalciferol ([employer]S VITAMIN D) 2000 units CAPS Take 2 tablets by mouth 2 (two) times a day      Cinnamon 500 MG TABS Take by mouth      coenzyme Q-10 100 MG capsule Take 2 capsules by mouth daily      glipiZIDE (GLUCOTROL XL) 10 mg 24 hr tablet Take 10 mg by mouth daily      insulin aspart (NovoLOG) 100 units/mL injection Inject 8 Units under the skin 3 (three) times a day before meals      insulin glargine (LANTUS) 100 units/mL subcutaneous injection Inject 70 Units under the skin daily       JANUVIA 100 MG tablet Take 100 mg by mouth daily  2    linaGLIPtin (TRADJENTA) 5 MG TABS Take 5 mg by mouth daily      metFORMIN (FORTAMET) 1000 MG (OSM) 24 hr tablet Take 1,000 mg by mouth 2 (two) times a day  2    metoprolol succinate (TOPROL-XL) 100 mg 24 hr tablet Take 100 mg by mouth daily      Multiple Vitamin (MULTI-VITAMIN DAILY) TABS Take 1 tablet by mouth daily      olmesartan (BENICAR) 40 mg tablet       pyridostigmine (MESTINON) 60 mg tablet Take 60 mg by mouth 3 (three) times a day        triamterene-hydrochlorothiazide (MAXZIDE-25) 37 5-25 mg per tablet Take 1 tablet by mouth daily      VASCEPA 1 g CAPS TAKE 1 CAPSULE BY MOUTH TWICE A DAY WITH MEALS  2    irbesartan (AVAPRO) 300 mg tablet Take 300 mg by mouth daily at bedtime  No current facility-administered medications for this visit  Allergies: No Known Allergies    Physical Exam:    Body surface area is 2 02 meters squared  Wt Readings from Last 3 Encounters:   03/19/20 95 3 kg (210 lb)   09/27/19 93 kg (205 lb)   09/12/19 93 kg (205 lb)        Temp Readings from Last 3 Encounters:   03/19/20 97 5 °F (36 4 °C) (Tympanic)   09/27/19 (!) 97 1 °F (36 2 °C) (Temporal)   09/12/19 (!) 96 1 °F (35 6 °C) (Tympanic)        BP Readings from Last 3 Encounters:   03/19/20 148/82   09/27/19 118/69   09/12/19 138/68         Pulse Readings from Last 3 Encounters:   03/19/20 94   09/27/19 76   09/12/19 76        Physical Exam     Constitutional   General appearance: No acute distress, well appearing and well nourished  Eyes   Conjunctiva and lids: No swelling, erythema or discharge  Pupils and irises: Equal, round and reactive to light  Ears, Nose, Mouth, and Throat   External inspection of ears and nose: Normal     Nasal mucosa, septum, and turbinates: Normal without edema or erythema  Oropharynx: Normal with no erythema, edema, exudate or lesions  Pulmonary   Respiratory effort: No increased work of breathing or signs of respiratory distress  Auscultation of lungs: Clear to auscultation  Cardiovascular   Palpation of heart: Normal PMI, no thrills  Auscultation of heart: Normal rate and rhythm, normal S1 and S2, without murmurs  Examination of extremities for edema and/or varicosities: Normal     Carotid pulses: Normal     Abdomen   Abdomen: Non-tender, no masses  Liver and spleen: No hepatomegaly or splenomegaly  Lymphatic   Palpation of lymph nodes in neck: No lymphadenopathy      Musculoskeletal   Gait and station: Normal     Digits and nails: Normal without clubbing or cyanosis  Inspection/palpation of joints, bones, and muscles: Normal     Skin   Skin and subcutaneous tissue: Normal without rashes or lesions  Neurologic   Cranial nerves: Cranial nerves 2-12 intact  Sensation: No sensory loss  Psychiatric   Orientation to person, place, and time: Normal     Mood and affect: Normal         Assessment / Plan:      The patient is a 63-year-old male with past medical history of carcinoid tumor that was removed in 2011  He has shown no evidence of disease since then  At his last visit he wished to continue to take oral iron to see if he could bring his iron level up  His iron saturation is15%  Iron studies are mostly normal and his hemoglobin is also normal   MCV is still low at 78  The patient still wishes to take oral iron as he states he is asymptomatic  I will however arrange for a CT scan of the chest abdomen pelvis to be done to make sure he has no evidence of progression or recurrence  I will call him with any abnormal results as long as he has imaging done at [address]  He states he will do so  I will see him back in 6 months with repeat blood work  At that point if he is iron deficient we will give him IV iron and B12  Goals and Barriers:  Current Goal:  Prolong Survival from iron deficiency anemia and carcinoid   Barriers: None  Patient's Capacity to Self Care:  Patient able to self care  Portions of the record may have been created with voice recognition software   Occasional wrong word or "sound a like" substitutions may have occurred due to the inherent limitations of voice recognition software   Read the chart carefully and recognize, using context, where substitutions have occurred

## 2020-04-20 ENCOUNTER — HOSPITAL ENCOUNTER (OUTPATIENT)
Dept: CT IMAGING | Facility: HOSPITAL | Age: 60
Discharge: HOME | End: 2020-04-20
Attending: INTERNAL MEDICINE
Payer: COMMERCIAL

## 2020-04-20 ENCOUNTER — TELEPHONE (OUTPATIENT)
Dept: HEMATOLOGY ONCOLOGY | Facility: MEDICAL CENTER | Age: 60
End: 2020-04-20

## 2020-04-20 DIAGNOSIS — D50.9 MICROCYTIC ANEMIA: ICD-10-CM

## 2020-04-20 DIAGNOSIS — D3A.029 CARCINOID TUMOR OF LARGE INTESTINE, UNSPECIFIED LOCATION, UNSPECIFIED WHETHER MALIGNANT: ICD-10-CM

## 2020-04-20 PROCEDURE — 71260 CT THORAX DX C+: CPT

## 2020-04-20 PROCEDURE — 74177 CT ABD & PELVIS W/CONTRAST: CPT

## 2020-04-20 RX ADMIN — IOHEXOL 100 ML: 350 INJECTION, SOLUTION INTRAVENOUS at 11:40

## 2020-04-28 ENCOUNTER — TELEPHONE (OUTPATIENT)
Dept: HEMATOLOGY ONCOLOGY | Facility: CLINIC | Age: 60
End: 2020-04-28

## 2020-04-30 ENCOUNTER — TELEPHONE (OUTPATIENT)
Dept: UROLOGY | Facility: AMBULATORY SURGERY CENTER | Age: 60
End: 2020-04-30

## 2020-04-30 ENCOUNTER — TELEMEDICINE (OUTPATIENT)
Dept: HEMATOLOGY ONCOLOGY | Facility: CLINIC | Age: 60
End: 2020-04-30
Payer: COMMERCIAL

## 2020-04-30 DIAGNOSIS — N32.89 BLADDER WALL THICKENING: ICD-10-CM

## 2020-04-30 DIAGNOSIS — D3A.029 CARCINOID TUMOR OF LARGE INTESTINE, UNSPECIFIED LOCATION, UNSPECIFIED WHETHER MALIGNANT: Primary | ICD-10-CM

## 2020-04-30 PROCEDURE — 99214 OFFICE O/P EST MOD 30 MIN: CPT | Performed by: INTERNAL MEDICINE

## 2020-05-07 PROBLEM — N32.89 BLADDER WALL THICKENING: Status: ACTIVE | Noted: 2020-05-07

## 2020-05-07 PROBLEM — Z71.2 ENCOUNTER TO DISCUSS TEST RESULTS: Status: ACTIVE | Noted: 2020-05-07

## 2020-05-07 PROBLEM — R93.41 ABNORMAL CT SCAN, BLADDER: Status: ACTIVE | Noted: 2020-05-07

## 2020-05-08 ENCOUNTER — PROCEDURE VISIT (OUTPATIENT)
Dept: UROLOGY | Facility: CLINIC | Age: 60
End: 2020-05-08
Payer: COMMERCIAL

## 2020-05-08 VITALS
BODY MASS INDEX: 35.16 KG/M2 | DIASTOLIC BLOOD PRESSURE: 90 MMHG | SYSTOLIC BLOOD PRESSURE: 160 MMHG | WEIGHT: 211 LBS | HEIGHT: 65 IN | HEART RATE: 79 BPM

## 2020-05-08 DIAGNOSIS — Z71.2 ENCOUNTER TO DISCUSS TEST RESULTS: ICD-10-CM

## 2020-05-08 DIAGNOSIS — N32.89 BLADDER WALL THICKENING: Primary | ICD-10-CM

## 2020-05-08 DIAGNOSIS — N32.9 LESION OF URINARY BLADDER: ICD-10-CM

## 2020-05-08 DIAGNOSIS — R93.41 ABNORMAL CT SCAN, BLADDER: ICD-10-CM

## 2020-05-08 LAB
POST-VOID RESIDUAL VOLUME, ML POC: 42 ML
SL AMB  POCT GLUCOSE, UA: NORMAL
SL AMB LEUKOCYTE ESTERASE,UA: NORMAL
SL AMB POCT BILIRUBIN,UA: NORMAL
SL AMB POCT BLOOD,UA: NORMAL
SL AMB POCT CLARITY,UA: CLEAR
SL AMB POCT COLOR,UA: YELLOW
SL AMB POCT KETONES,UA: NORMAL
SL AMB POCT NITRITE,UA: NORMAL
SL AMB POCT PH,UA: 5
SL AMB POCT SPECIFIC GRAVITY,UA: 1.01
SL AMB POCT URINE PROTEIN: 3
SL AMB POCT UROBILINOGEN: 0.2

## 2020-05-08 PROCEDURE — 3061F NEG MICROALBUMINURIA REV: CPT | Performed by: UROLOGY

## 2020-05-08 PROCEDURE — 81002 URINALYSIS NONAUTO W/O SCOPE: CPT | Performed by: UROLOGY

## 2020-05-08 PROCEDURE — 51798 US URINE CAPACITY MEASURE: CPT | Performed by: UROLOGY

## 2020-05-08 PROCEDURE — 52000 CYSTOURETHROSCOPY: CPT | Performed by: UROLOGY

## 2020-05-08 PROCEDURE — 99205 OFFICE O/P NEW HI 60 MIN: CPT | Performed by: UROLOGY

## 2020-05-08 RX ORDER — ACETAMINOPHEN 325 MG/1
975 TABLET ORAL ONCE
Status: CANCELLED | OUTPATIENT
Start: 2020-05-08 | End: 2020-05-08

## 2020-05-08 RX ORDER — GABAPENTIN 100 MG/1
300 CAPSULE ORAL ONCE
Status: CANCELLED | OUTPATIENT
Start: 2020-05-08 | End: 2020-05-08

## 2020-05-11 ENCOUNTER — TELEPHONE (OUTPATIENT)
Dept: UROLOGY | Facility: CLINIC | Age: 60
End: 2020-05-11

## 2020-05-11 DIAGNOSIS — N32.89 BLADDER WALL THICKENING: Primary | ICD-10-CM

## 2020-05-12 ENCOUNTER — APPOINTMENT (OUTPATIENT)
Dept: LAB | Facility: CLINIC | Age: 60
End: 2020-05-12
Payer: COMMERCIAL

## 2020-05-12 ENCOUNTER — TELEPHONE (OUTPATIENT)
Dept: UROLOGY | Facility: CLINIC | Age: 60
End: 2020-05-12

## 2020-05-12 DIAGNOSIS — N32.89 BLADDER WALL THICKENING: ICD-10-CM

## 2020-05-12 LAB
BACTERIA UR QL AUTO: ABNORMAL /HPF
BILIRUB UR QL STRIP: NEGATIVE
CLARITY UR: CLEAR
COLOR UR: YELLOW
GLUCOSE UR STRIP-MCNC: ABNORMAL MG/DL
HGB UR QL STRIP.AUTO: NEGATIVE
KETONES UR STRIP-MCNC: NEGATIVE MG/DL
LEUKOCYTE ESTERASE UR QL STRIP: NEGATIVE
NITRITE UR QL STRIP: NEGATIVE
NON-SQ EPI CELLS URNS QL MICRO: ABNORMAL /HPF
PH UR STRIP.AUTO: 5.5 [PH]
PROT UR STRIP-MCNC: NEGATIVE MG/DL
RBC #/AREA URNS AUTO: ABNORMAL /HPF
SP GR UR STRIP.AUTO: >=1.03 (ref 1–1.03)
UROBILINOGEN UR QL STRIP.AUTO: 0.2 E.U./DL
WBC #/AREA URNS AUTO: ABNORMAL /HPF

## 2020-05-12 PROCEDURE — 81001 URINALYSIS AUTO W/SCOPE: CPT

## 2020-06-04 ENCOUNTER — APPOINTMENT (OUTPATIENT)
Dept: LAB | Facility: CLINIC | Age: 60
End: 2020-06-04
Payer: COMMERCIAL

## 2020-06-04 ENCOUNTER — OFFICE VISIT (OUTPATIENT)
Dept: LAB | Facility: CLINIC | Age: 60
End: 2020-06-04
Payer: COMMERCIAL

## 2020-06-04 DIAGNOSIS — N32.89 BLADDER WALL THICKENING: ICD-10-CM

## 2020-06-04 LAB
ANION GAP SERPL CALCULATED.3IONS-SCNC: 8 MMOL/L (ref 4–13)
APTT PPP: 36 SECONDS (ref 23–37)
ATRIAL RATE: 72 BPM
BASOPHILS # BLD AUTO: 0.03 THOUSANDS/ΜL (ref 0–0.1)
BASOPHILS NFR BLD AUTO: 0 % (ref 0–1)
BUN SERPL-MCNC: 11 MG/DL (ref 5–25)
CALCIUM SERPL-MCNC: 9.2 MG/DL (ref 8.3–10.1)
CHLORIDE SERPL-SCNC: 105 MMOL/L (ref 100–108)
CO2 SERPL-SCNC: 33 MMOL/L (ref 21–32)
CREAT SERPL-MCNC: 1.01 MG/DL (ref 0.6–1.3)
EOSINOPHIL # BLD AUTO: 0.15 THOUSAND/ΜL (ref 0–0.61)
EOSINOPHIL NFR BLD AUTO: 2 % (ref 0–6)
ERYTHROCYTE [DISTWIDTH] IN BLOOD BY AUTOMATED COUNT: 13.3 % (ref 11.6–15.1)
EST. AVERAGE GLUCOSE BLD GHB EST-MCNC: 148 MG/DL
GFR SERPL CREATININE-BSD FRML MDRD: 93 ML/MIN/1.73SQ M
GLUCOSE P FAST SERPL-MCNC: 83 MG/DL (ref 65–99)
HBA1C MFR BLD: 6.8 %
HCT VFR BLD AUTO: 43.3 % (ref 36.5–49.3)
HGB BLD-MCNC: 13.4 G/DL (ref 12–17)
IMM GRANULOCYTES # BLD AUTO: 0.02 THOUSAND/UL (ref 0–0.2)
IMM GRANULOCYTES NFR BLD AUTO: 0 % (ref 0–2)
INR PPP: 1.06 (ref 0.84–1.19)
LYMPHOCYTES # BLD AUTO: 1.19 THOUSANDS/ΜL (ref 0.6–4.47)
LYMPHOCYTES NFR BLD AUTO: 16 % (ref 14–44)
MCH RBC QN AUTO: 26.3 PG (ref 26.8–34.3)
MCHC RBC AUTO-ENTMCNC: 30.9 G/DL (ref 31.4–37.4)
MCV RBC AUTO: 85 FL (ref 82–98)
MONOCYTES # BLD AUTO: 0.55 THOUSAND/ΜL (ref 0.17–1.22)
MONOCYTES NFR BLD AUTO: 8 % (ref 4–12)
NEUTROPHILS # BLD AUTO: 5.37 THOUSANDS/ΜL (ref 1.85–7.62)
NEUTS SEG NFR BLD AUTO: 74 % (ref 43–75)
NRBC BLD AUTO-RTO: 0 /100 WBCS
P AXIS: 59 DEGREES
PLATELET # BLD AUTO: 259 THOUSANDS/UL (ref 149–390)
PMV BLD AUTO: 10.5 FL (ref 8.9–12.7)
POTASSIUM SERPL-SCNC: 3.4 MMOL/L (ref 3.5–5.3)
PR INTERVAL: 158 MS
PROTHROMBIN TIME: 13.2 SECONDS (ref 11.6–14.5)
PSA SERPL-MCNC: 1.9 NG/ML (ref 0–4)
QRS AXIS: 47 DEGREES
QRSD INTERVAL: 92 MS
QT INTERVAL: 352 MS
QTC INTERVAL: 385 MS
RBC # BLD AUTO: 5.09 MILLION/UL (ref 3.88–5.62)
SODIUM SERPL-SCNC: 146 MMOL/L (ref 136–145)
T WAVE AXIS: 0 DEGREES
VENTRICULAR RATE: 72 BPM
WBC # BLD AUTO: 7.31 THOUSAND/UL (ref 4.31–10.16)

## 2020-06-04 PROCEDURE — 85610 PROTHROMBIN TIME: CPT

## 2020-06-04 PROCEDURE — 80048 BASIC METABOLIC PNL TOTAL CA: CPT

## 2020-06-04 PROCEDURE — 36415 COLL VENOUS BLD VENIPUNCTURE: CPT

## 2020-06-04 PROCEDURE — 85730 THROMBOPLASTIN TIME PARTIAL: CPT

## 2020-06-04 PROCEDURE — 84153 ASSAY OF PSA TOTAL: CPT

## 2020-06-04 PROCEDURE — 85025 COMPLETE CBC W/AUTO DIFF WBC: CPT

## 2020-06-04 PROCEDURE — 93010 ELECTROCARDIOGRAM REPORT: CPT | Performed by: INTERNAL MEDICINE

## 2020-06-04 PROCEDURE — 3044F HG A1C LEVEL LT 7.0%: CPT | Performed by: UROLOGY

## 2020-06-04 PROCEDURE — 83036 HEMOGLOBIN GLYCOSYLATED A1C: CPT

## 2020-06-04 PROCEDURE — 93005 ELECTROCARDIOGRAM TRACING: CPT

## 2020-06-04 PROCEDURE — 87086 URINE CULTURE/COLONY COUNT: CPT

## 2020-06-05 LAB — BACTERIA UR CULT: NORMAL

## 2020-06-16 PROBLEM — E11.9 DIABETES MELLITUS WITHOUT COMPLICATION (HCC): Status: ACTIVE | Noted: 2020-06-16

## 2020-06-16 PROBLEM — I10 ESSENTIAL HYPERTENSION: Status: ACTIVE | Noted: 2020-06-16

## 2020-06-16 PROBLEM — G47.33 OSA (OBSTRUCTIVE SLEEP APNEA): Status: ACTIVE | Noted: 2020-06-16

## 2020-06-16 PROBLEM — Z71.2 ENCOUNTER TO DISCUSS TEST RESULTS: Status: RESOLVED | Noted: 2020-05-07 | Resolved: 2020-06-16

## 2020-06-16 PROBLEM — Z85.040: Status: ACTIVE | Noted: 2020-06-16

## 2020-06-16 PROBLEM — G70.00 MYASTHENIA GRAVIS (HCC): Status: ACTIVE | Noted: 2020-06-16

## 2020-06-16 PROBLEM — E78.00 HYPERCHOLESTEROLEMIA: Status: ACTIVE | Noted: 2020-06-16

## 2020-06-17 ENCOUNTER — OFFICE VISIT (OUTPATIENT)
Dept: FAMILY MEDICINE CLINIC | Facility: CLINIC | Age: 60
End: 2020-06-17
Payer: COMMERCIAL

## 2020-06-17 VITALS
SYSTOLIC BLOOD PRESSURE: 140 MMHG | HEART RATE: 84 BPM | BODY MASS INDEX: 33.91 KG/M2 | WEIGHT: 211 LBS | OXYGEN SATURATION: 98 % | TEMPERATURE: 97.3 F | HEIGHT: 66 IN | DIASTOLIC BLOOD PRESSURE: 70 MMHG | RESPIRATION RATE: 16 BRPM

## 2020-06-17 DIAGNOSIS — Z01.818 PREOP EXAMINATION: Primary | ICD-10-CM

## 2020-06-17 DIAGNOSIS — D49.4 BLADDER TUMOR: ICD-10-CM

## 2020-06-17 DIAGNOSIS — G70.00 MYASTHENIA GRAVIS (HCC): ICD-10-CM

## 2020-06-17 DIAGNOSIS — I10 ESSENTIAL HYPERTENSION: ICD-10-CM

## 2020-06-17 DIAGNOSIS — E11.9 DIABETES MELLITUS WITHOUT COMPLICATION (HCC): ICD-10-CM

## 2020-06-17 PROCEDURE — 99214 OFFICE O/P EST MOD 30 MIN: CPT | Performed by: FAMILY MEDICINE

## 2020-06-17 PROCEDURE — 3044F HG A1C LEVEL LT 7.0%: CPT | Performed by: FAMILY MEDICINE

## 2020-06-19 DIAGNOSIS — N32.89 BLADDER WALL THICKENING: ICD-10-CM

## 2020-06-19 PROCEDURE — U0003 INFECTIOUS AGENT DETECTION BY NUCLEIC ACID (DNA OR RNA); SEVERE ACUTE RESPIRATORY SYNDROME CORONAVIRUS 2 (SARS-COV-2) (CORONAVIRUS DISEASE [COVID-19]), AMPLIFIED PROBE TECHNIQUE, MAKING USE OF HIGH THROUGHPUT TECHNOLOGIES AS DESCRIBED BY CMS-2020-01-R: HCPCS

## 2020-06-20 LAB — SARS-COV-2 RNA SPEC QL NAA+PROBE: NOT DETECTED

## 2020-06-22 ENCOUNTER — ANESTHESIA EVENT (OUTPATIENT)
Dept: PERIOP | Facility: HOSPITAL | Age: 60
End: 2020-06-22
Payer: COMMERCIAL

## 2020-06-25 ENCOUNTER — TELEPHONE (OUTPATIENT)
Dept: UROLOGY | Facility: CLINIC | Age: 60
End: 2020-06-25

## 2020-06-25 ENCOUNTER — APPOINTMENT (OUTPATIENT)
Dept: RADIOLOGY | Facility: HOSPITAL | Age: 60
End: 2020-06-25
Payer: COMMERCIAL

## 2020-06-25 ENCOUNTER — HOSPITAL ENCOUNTER (OUTPATIENT)
Facility: HOSPITAL | Age: 60
Setting detail: OUTPATIENT SURGERY
Discharge: HOME | End: 2020-06-25
Attending: UROLOGY | Admitting: UROLOGY
Payer: COMMERCIAL

## 2020-06-25 ENCOUNTER — ANESTHESIA (OUTPATIENT)
Dept: PERIOP | Facility: HOSPITAL | Age: 60
End: 2020-06-25
Payer: COMMERCIAL

## 2020-06-25 VITALS
SYSTOLIC BLOOD PRESSURE: 138 MMHG | WEIGHT: 214.51 LBS | HEIGHT: 66 IN | OXYGEN SATURATION: 95 % | TEMPERATURE: 97.3 F | HEART RATE: 74 BPM | DIASTOLIC BLOOD PRESSURE: 74 MMHG | RESPIRATION RATE: 23 BRPM | BODY MASS INDEX: 34.47 KG/M2

## 2020-06-25 DIAGNOSIS — N32.89 BLADDER WALL THICKENING: ICD-10-CM

## 2020-06-25 DIAGNOSIS — D49.4 BLADDER TUMOR: Primary | ICD-10-CM

## 2020-06-25 DIAGNOSIS — N32.9 LESION OF URINARY BLADDER: ICD-10-CM

## 2020-06-25 PROBLEM — Z01.818 PREOP EXAMINATION: Status: RESOLVED | Noted: 2020-06-17 | Resolved: 2020-06-25

## 2020-06-25 LAB
GLUCOSE SERPL-MCNC: 67 MG/DL (ref 65–140)
GLUCOSE SERPL-MCNC: 80 MG/DL (ref 65–140)
GLUCOSE SERPL-MCNC: 83 MG/DL (ref 65–140)

## 2020-06-25 PROCEDURE — C1769 GUIDE WIRE: HCPCS | Performed by: UROLOGY

## 2020-06-25 PROCEDURE — 74420 UROGRAPHY RTRGR +-KUB: CPT

## 2020-06-25 PROCEDURE — 88307 TISSUE EXAM BY PATHOLOGIST: CPT | Performed by: PATHOLOGY

## 2020-06-25 PROCEDURE — NC001 PR NO CHARGE: Performed by: UROLOGY

## 2020-06-25 PROCEDURE — 52235 CYSTOSCOPY AND TREATMENT: CPT | Performed by: UROLOGY

## 2020-06-25 PROCEDURE — 82948 REAGENT STRIP/BLOOD GLUCOSE: CPT

## 2020-06-25 RX ORDER — FENTANYL CITRATE/PF 50 MCG/ML
50 SYRINGE (ML) INJECTION
Status: DISCONTINUED | OUTPATIENT
Start: 2020-06-25 | End: 2020-06-25 | Stop reason: HOSPADM

## 2020-06-25 RX ORDER — DEXAMETHASONE SODIUM PHOSPHATE 4 MG/ML
INJECTION, SOLUTION INTRA-ARTICULAR; INTRALESIONAL; INTRAMUSCULAR; INTRAVENOUS; SOFT TISSUE AS NEEDED
Status: DISCONTINUED | OUTPATIENT
Start: 2020-06-25 | End: 2020-06-25 | Stop reason: SURG

## 2020-06-25 RX ORDER — METOCLOPRAMIDE HYDROCHLORIDE 5 MG/ML
10 INJECTION INTRAMUSCULAR; INTRAVENOUS ONCE AS NEEDED
Status: DISCONTINUED | OUTPATIENT
Start: 2020-06-25 | End: 2020-06-25 | Stop reason: HOSPADM

## 2020-06-25 RX ORDER — OXYCODONE HYDROCHLORIDE AND ACETAMINOPHEN 5; 325 MG/1; MG/1
1 TABLET ORAL EVERY 6 HOURS PRN
Qty: 12 TABLET | Refills: 0 | Status: SHIPPED | OUTPATIENT
Start: 2020-06-25 | End: 2020-06-30

## 2020-06-25 RX ORDER — MAGNESIUM HYDROXIDE 1200 MG/15ML
LIQUID ORAL AS NEEDED
Status: DISCONTINUED | OUTPATIENT
Start: 2020-06-25 | End: 2020-06-25 | Stop reason: HOSPADM

## 2020-06-25 RX ORDER — GABAPENTIN 300 MG/1
300 CAPSULE ORAL ONCE
Status: COMPLETED | OUTPATIENT
Start: 2020-06-25 | End: 2020-06-25

## 2020-06-25 RX ORDER — DIPHENHYDRAMINE HCL 25 MG
25 TABLET ORAL EVERY 6 HOURS PRN
Status: CANCELLED | OUTPATIENT
Start: 2020-06-25

## 2020-06-25 RX ORDER — FENTANYL CITRATE 50 UG/ML
INJECTION, SOLUTION INTRAMUSCULAR; INTRAVENOUS AS NEEDED
Status: DISCONTINUED | OUTPATIENT
Start: 2020-06-25 | End: 2020-06-25 | Stop reason: SURG

## 2020-06-25 RX ORDER — ONDANSETRON 2 MG/ML
4 INJECTION INTRAMUSCULAR; INTRAVENOUS ONCE AS NEEDED
Status: DISCONTINUED | OUTPATIENT
Start: 2020-06-25 | End: 2020-06-25 | Stop reason: HOSPADM

## 2020-06-25 RX ORDER — PROPOFOL 10 MG/ML
INJECTION, EMULSION INTRAVENOUS AS NEEDED
Status: DISCONTINUED | OUTPATIENT
Start: 2020-06-25 | End: 2020-06-25 | Stop reason: SURG

## 2020-06-25 RX ORDER — OXYCODONE HYDROCHLORIDE AND ACETAMINOPHEN 5; 325 MG/1; MG/1
2 TABLET ORAL EVERY 6 HOURS PRN
Status: CANCELLED | OUTPATIENT
Start: 2020-06-25

## 2020-06-25 RX ORDER — CEFAZOLIN SODIUM 2 G/50ML
2000 SOLUTION INTRAVENOUS ONCE
Status: COMPLETED | OUTPATIENT
Start: 2020-06-25 | End: 2020-06-25

## 2020-06-25 RX ORDER — DOCUSATE SODIUM 100 MG/1
100 CAPSULE, LIQUID FILLED ORAL 3 TIMES DAILY
Qty: 21 CAPSULE | Refills: 0 | Status: SHIPPED | OUTPATIENT
Start: 2020-06-25 | End: 2021-02-22 | Stop reason: ALTCHOICE

## 2020-06-25 RX ORDER — ACETAMINOPHEN 325 MG/1
975 TABLET ORAL ONCE
Status: COMPLETED | OUTPATIENT
Start: 2020-06-25 | End: 2020-06-25

## 2020-06-25 RX ORDER — ACETAMINOPHEN 325 MG/1
650 TABLET ORAL EVERY 6 HOURS PRN
Status: CANCELLED | OUTPATIENT
Start: 2020-06-25

## 2020-06-25 RX ORDER — SODIUM CHLORIDE, SODIUM LACTATE, POTASSIUM CHLORIDE, CALCIUM CHLORIDE 600; 310; 30; 20 MG/100ML; MG/100ML; MG/100ML; MG/100ML
INJECTION, SOLUTION INTRAVENOUS CONTINUOUS PRN
Status: DISCONTINUED | OUTPATIENT
Start: 2020-06-25 | End: 2020-06-25 | Stop reason: SURG

## 2020-06-25 RX ORDER — ONDANSETRON 2 MG/ML
4 INJECTION INTRAMUSCULAR; INTRAVENOUS EVERY 6 HOURS PRN
Status: CANCELLED | OUTPATIENT
Start: 2020-06-25

## 2020-06-25 RX ORDER — SULFAMETHOXAZOLE AND TRIMETHOPRIM 800; 160 MG/1; MG/1
1 TABLET ORAL EVERY 12 HOURS SCHEDULED
Qty: 4 TABLET | Refills: 0 | Status: SHIPPED | OUTPATIENT
Start: 2020-06-25 | End: 2020-06-27

## 2020-06-25 RX ADMIN — SODIUM CHLORIDE, SODIUM LACTATE, POTASSIUM CHLORIDE, AND CALCIUM CHLORIDE: .6; .31; .03; .02 INJECTION, SOLUTION INTRAVENOUS at 11:33

## 2020-06-25 RX ADMIN — GABAPENTIN 300 MG: 300 CAPSULE ORAL at 11:19

## 2020-06-25 RX ADMIN — LIDOCAINE HYDROCHLORIDE 50 MG: 20 INJECTION, SOLUTION INTRAVENOUS at 11:42

## 2020-06-25 RX ADMIN — FENTANYL CITRATE 25 MCG: 50 INJECTION, SOLUTION INTRAMUSCULAR; INTRAVENOUS at 11:47

## 2020-06-25 RX ADMIN — FENTANYL CITRATE 25 MCG: 50 INJECTION, SOLUTION INTRAMUSCULAR; INTRAVENOUS at 12:07

## 2020-06-25 RX ADMIN — PROPOFOL 200 MG: 10 INJECTION, EMULSION INTRAVENOUS at 11:42

## 2020-06-25 RX ADMIN — CEFAZOLIN SODIUM 2000 MG: 2 SOLUTION INTRAVENOUS at 11:35

## 2020-06-25 RX ADMIN — FENTANYL CITRATE 25 MCG: 50 INJECTION, SOLUTION INTRAMUSCULAR; INTRAVENOUS at 12:02

## 2020-06-25 RX ADMIN — DEXAMETHASONE SODIUM PHOSPHATE 4 MG: 4 INJECTION, SOLUTION INTRAMUSCULAR; INTRAVENOUS at 11:47

## 2020-06-25 RX ADMIN — ACETAMINOPHEN 975 MG: 325 TABLET, FILM COATED ORAL at 11:19

## 2020-06-25 RX ADMIN — FENTANYL CITRATE 25 MCG: 50 INJECTION, SOLUTION INTRAMUSCULAR; INTRAVENOUS at 11:55

## 2020-06-29 ENCOUNTER — PROCEDURE VISIT (OUTPATIENT)
Dept: UROLOGY | Facility: AMBULATORY SURGERY CENTER | Age: 60
End: 2020-06-29
Payer: COMMERCIAL

## 2020-06-29 VITALS
HEIGHT: 66 IN | DIASTOLIC BLOOD PRESSURE: 74 MMHG | BODY MASS INDEX: 33.85 KG/M2 | TEMPERATURE: 97.1 F | WEIGHT: 210.6 LBS | SYSTOLIC BLOOD PRESSURE: 150 MMHG | HEART RATE: 95 BPM

## 2020-06-29 DIAGNOSIS — R33.9 URINARY RETENTION: Primary | ICD-10-CM

## 2020-06-29 LAB — POST-VOID RESIDUAL VOLUME, ML POC: 107 ML

## 2020-06-29 PROCEDURE — 3078F DIAST BP <80 MM HG: CPT

## 2020-06-29 PROCEDURE — 51798 US URINE CAPACITY MEASURE: CPT

## 2020-06-29 PROCEDURE — ND001 PR NO DOCUMENTATION

## 2020-06-29 PROCEDURE — 3077F SYST BP >= 140 MM HG: CPT

## 2020-07-16 PROBLEM — N30.80 CYSTITIS GLANDULARIS: Status: ACTIVE | Noted: 2020-07-16

## 2020-07-16 NOTE — PROGRESS NOTES
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  I do recommend surveillance cystoscopy in 6 months, to survey this area, we will continue this for a number years, and then consider yearly cystoscopy there after as there is a small risk of malignant transformation of this pathologic finding  All questions and concerns answered and addressed  Assessment and plan:       Please see problem oriented charting for the assessment plan of today's urological complaints    Mona Flood MD      Chief Complaint     Chief Complaint   Patient presents with    Post-op         History of Present Illness     Sayra Lo is a 61 y o  man originally referred to me in consultation by Dr Lorene Moreira for bladder wall thickening, 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  He is status post resection of the abnormal appearing area by way of a TURBT some weeks ago, this shows no malignant findings but does show 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  I reviewed with him the recommendation for surveillance cystoscopy intermittently, after this discussion he wishes to proceed with this  New urologic complaints today include  The following portions of the patient's history were reviewed and updated as appropriate: allergies, current medications, past family history, past medical history, past social history, past surgical history and problem list       Detailed Urologic History     - please refer to HPI    Review of Systems     Review of Systems   Constitutional: Negative  HENT: Negative  Eyes: Negative  Respiratory: Negative  Cardiovascular: Negative      Gastrointestinal: Negative  Endocrine: Negative  Genitourinary: Negative  Musculoskeletal: Negative  Skin: Negative  Allergic/Immunologic: Negative  Neurological: Negative  Hematological: Negative  Psychiatric/Behavioral: Negative  Allergies     No Known Allergies    Physical Exam     Physical Exam   Constitutional: He is oriented to person, place, and time  He appears well-developed and well-nourished  No distress  HENT:   Head: Normocephalic and atraumatic  Eyes: Right eye exhibits no discharge  Left eye exhibits no discharge  Neck: No tracheal deviation present  Cardiovascular: Intact distal pulses  Pulmonary/Chest: Effort normal  No stridor  No respiratory distress  Abdominal: He exhibits no distension  Musculoskeletal: He exhibits no edema, tenderness or deformity  Neurological: He is alert and oriented to person, place, and time  No cranial nerve deficit  Coordination normal    Skin: Skin is dry  No rash noted  He is not diaphoretic  Psychiatric: He has a normal mood and affect  His behavior is normal  Judgment and thought content normal    Nursing note and vitals reviewed  Vital Signs  Vitals:    07/17/20 1102   BP: 138/76   Pulse: 84   Temp: (!) 97 °F (36 1 °C)   Weight: 93 kg (205 lb)   Height: 5' 6" (1 676 m)         Current Medications       Current Outpatient Medications:     amLODIPine (NORVASC) 10 mg tablet, Take 10 mg by mouth daily  , Disp: , Rfl:     Ascorbic Acid (VITAMIN C) 500 MG CAPS, Take 1 tablet by mouth daily, Disp: , Rfl:     aspirin 81 MG tablet, Take 81 mg by mouth daily  , Disp: , Rfl:     atorvastatin (LIPITOR) 40 mg tablet, Take 40 mg by mouth daily at bedtime, Disp: , Rfl: 4    B-D UF III MINI PEN NEEDLES 31G X 5 MM MISC, USE 1 DAILY, Disp: , Rfl: 5    Cholecalciferol ([employer] VITAMIN D) 2000 units CAPS, Take 2 tablets by mouth 2 (two) times a day, Disp: , Rfl:     Cinnamon 500 MG TABS, Take by mouth, Disp: , Rfl:     coenzyme Q-10 100 MG capsule, Take 2 capsules by mouth daily, Disp: , Rfl:     glipiZIDE (GLUCOTROL XL) 10 mg 24 hr tablet, Take 10 mg by mouth daily, Disp: , Rfl:     insulin aspart (NovoLOG) 100 units/mL injection, Inject 8 Units under the skin daily , Disp: , Rfl:     Insulin Glargine (BASAGLAR KWIKPEN SC), Inject 70 Units under the skin daily, Disp: , Rfl:     JANUVIA 100 MG tablet, Take 100 mg by mouth daily, Disp: , Rfl: 2    metFORMIN (FORTAMET) 1000 MG (OSM) 24 hr tablet, Take 1,000 mg by mouth 2 (two) times a day, Disp: , Rfl: 2    metoprolol succinate (TOPROL-XL) 100 mg 24 hr tablet, Take 100 mg by mouth daily, Disp: , Rfl:     Multiple Vitamin (MULTI-VITAMIN DAILY) TABS, Take 1 tablet by mouth daily, Disp: , Rfl:     olmesartan (BENICAR) 40 mg tablet, Take 40 mg by mouth daily , Disp: , Rfl:     pyridostigmine (MESTINON) 60 mg tablet, Take 60 mg by mouth 3 (three) times a day , Disp: , Rfl:     triamterene-hydrochlorothiazide (MAXZIDE-25) 37 5-25 mg per tablet, Take 1 tablet by mouth daily, Disp: , Rfl:     VASCEPA 1 g CAPS, TAKE 1 CAPSULE BY MOUTH TWICE A DAY WITH MEALS, Disp: , Rfl: 2    docusate sodium (COLACE) 100 mg capsule, Take 1 capsule (100 mg total) by mouth 3 (three) times a day for 7 days, Disp: 21 capsule, Rfl: 0      Active Problems     Patient Active Problem List   Diagnosis    Bladder wall thickening    Abnormal CT scan, bladder    Essential hypertension    Diabetes mellitus without complication (HCC)    Hypercholesterolemia    JERO (obstructive sleep apnea)    Myasthenia gravis (HCC)    History of malignant carcinoid tumor of rectum    Bladder tumor    Cystitis glandularis         Past Medical History     Past Medical History:   Diagnosis Date    Carcinoid tumor of colon     Colon polyp     CPAP (continuous positive airway pressure) dependence     Cyst of skin and subcutaneous tissue     back    Diabetes mellitus (HCC)     Hyperlipidemia     Hypertension     Myasthenia gravis [facility])     Sleep apnea          Surgical History     Past Surgical History:   Procedure Laterality Date    BACK SURGERY  01/01/2001    lump removed     COLONOSCOPY      1/1/12,1/1/2016 tumor removed     DENTAL SURGERY      FL RETROGRADE PYELOGRAM  6/25/2020    NASAL POLYP EXCISION      DC COLONOSCOPY FLX DX W/COLLJ SPEC WHEN PFRMD N/A 7/19/2016    Procedure: COLONOSCOPY;  Surgeon: Yvonne Chanel MD;  [facility]; Service: Gastroenterology    DC CYSTOURETHROSCOPY,FULGUR <0 5 CM LESN N/A 6/25/2020    Procedure: TRANSURETHRAL RESECTION OF BLADDER TUMOR (TURBT); Surgeon: Phillip Irwin MD;  Location: [facility];  Service: Urology    DC CYSTOURETHROSCOPY,URETER CATHETER Bilateral 6/25/2020    Procedure: CYSTOSCOPY WITH RETROGRADE PYELOGRAM;  Surgeon: Phillip Irwin MD;  Location[facility];  Service: Urology         Family History     Family History   Problem Relation Age of Onset    Diabetes Mother     Heart disease Father     Heart failure Father     Prostate cancer Father          Social History     Social History     Social History     Tobacco Use   Smoking Status Never Smoker   Smokeless Tobacco Never Used         Pertinent Lab Values     Lab Results   Component Value Date    CREATININE 1 01 06/04/2020       Lab Results   Component Value Date    PSA 1 9 06/04/2020         Final Diagnosis   A  Bladder, Posterior, TURBT:  - 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

## 2020-07-16 NOTE — PATIENT INSTRUCTIONS
Cystoscopy   WHAT YOU SHOULD KNOW:   Cystoscopy is a procedure to examine the inside of your bladder with a scope  A scope is a small tube with a light and camera on the end  Dye may be used to help your caregiver see your bladder better  CARE AGREEMENT:   You have the right to help plan your care  Learn about your health condition and how it may be treated  Discuss treatment options with your caregivers to decide what care you want to receive  You always have the right to refuse treatment  RISKS:   · You may feel pain or discomfort when a rigid scope is used during your procedure  Small or flat tumors may not be seen with a rigid or flexible scope  Inflamed cells may be mistaken for tumors when fluorescence cystoscopy is done  Dye used for fluorescence cystoscopy may cause damage to your skin  You may get an infection, have blood in your urine, or have swelling that blocks the flow of urine  You may have pain when your urinate, feel like you need to urinate right away, or have difficulty having a bowel movement  After your procedure, you may have headaches or dizziness  You may lose your appetite, have nausea, or vomit  · If you do not have the cystoscopy, the cause of your bladder problems may not be found or treated  Your signs and symptoms may not resolve and may get worse  GETTING READY:   Before your procedure:   · Bring your medicine bottles or a list of your medicines when you see your caregiver  Tell your caregiver if you are allergic to any medicine  Tell your caregiver if you use any herbs, food supplements, or over-the-counter medicine  · If you smoke, your caregiver may tell you not to smoke for at least 12 hours before your procedure  You may also need to avoid drinks with alcohol and caffeine  · If you have a urinary infection, you may need to take antibiotic medicine before your procedure  Antibiotic medicine helps kill the bacteria that caused your infection   Ask your caregiver if you need to take this medicine  · You may need to have blood and urine tests done before your procedure  Ask your caregiver for more information about these and other tests that you may need  Write down the date, time, and location of each test     · Write down the correct date, time, and location of your procedure  Night before your procedure:   · Ask caregivers about directions for eating and drinking  Day of your procedure:   · You or a close family member will be asked to sign a legal document called a consent form  It gives caregivers permission to do the procedure or surgery  It also explains the problems that may happen, and your choices  Make sure all your questions are answered before you sign this form  · Caregivers may insert an intravenous tube (IV) into your vein  A vein in the arm is usually chosen  Through the IV tube, you may be given liquids and medicine  · If you need a fluorescence cystoscopy, your caregiver will put a dye into your bladder  The dye will need to be in your bladder for a short period of time before your procedure starts  Tell caregivers if you are allergic to iodine or shellfish  You may also be allergic to the dye  · An anesthesiologist will talk to you before your surgery  You may need medicine to keep you asleep or numb an area of your body during surgery  Tell caregivers if you or anyone in your family has had a problem with anesthesia in the past   TREATMENT:   What will happen:   · You will be taken to the room where your procedure will be done  You will be given anesthesia medicine to make you comfortable during your procedure  You may also have numbing medicine put into your urethra to decrease pain  During your procedure, you may be fully asleep or you may be drowsy and numb in your hips and legs  · Your caregiver will put a cystoscope through your urethra and into your bladder   Your caregiver will carefully check your bladder for problems or abnormal growths  Dye may be put into your urethra so your caregiver can take samples of your bladder cells  The dye will make the bladder cells show up better under a microscope  Your caregiver may take tissue samples from your bladder and send them to a lab for tests  He may remove stones or abnormal growths  He may place or remove a stent, which is a bendable tube put into your urethra to help you urinate  When your procedure is finished, your caregiver will carefully remove the scope  After your procedure: You will be taken to a room where you can rest  You will stay there until you are fully awake and feeling returns in your hips and legs  Caregivers will watch you closely for any problems  Do not get out of bed until your caregiver says it is okay  When caregivers see that you are okay, you may be able to go home  If you are staying in the hospital, you will be taken back to your room  CONTACT A CAREGIVER IF:   · You cannot make it to your procedure  · You get a cold or the flu  © 2014 [employer] is for End User's use only and may not be sold, redistributed or otherwise used for commercial purposes  All illustrations and images included in CareNotes® are the copyrighted property of [employer]  or [employer]  The above information is an  only  It is not intended as medical advice for individual conditions or treatments  Talk to your doctor, nurse or pharmacist before following any medical regimen to see if it is safe and effective for you

## 2020-07-17 ENCOUNTER — OFFICE VISIT (OUTPATIENT)
Dept: UROLOGY | Facility: CLINIC | Age: 60
End: 2020-07-17
Payer: COMMERCIAL

## 2020-07-17 VITALS
SYSTOLIC BLOOD PRESSURE: 138 MMHG | HEART RATE: 84 BPM | BODY MASS INDEX: 32.95 KG/M2 | HEIGHT: 66 IN | DIASTOLIC BLOOD PRESSURE: 76 MMHG | WEIGHT: 205 LBS | TEMPERATURE: 97 F

## 2020-07-17 DIAGNOSIS — D49.4 BLADDER TUMOR: ICD-10-CM

## 2020-07-17 DIAGNOSIS — N32.89 BLADDER WALL THICKENING: Primary | ICD-10-CM

## 2020-07-17 DIAGNOSIS — N30.80 CYSTITIS GLANDULARIS: ICD-10-CM

## 2020-07-17 PROCEDURE — 99214 OFFICE O/P EST MOD 30 MIN: CPT | Performed by: UROLOGY

## 2020-07-17 PROCEDURE — 3044F HG A1C LEVEL LT 7.0%: CPT | Performed by: UROLOGY

## 2020-07-17 PROCEDURE — 3008F BODY MASS INDEX DOCD: CPT | Performed by: UROLOGY

## 2020-07-17 PROCEDURE — 3075F SYST BP GE 130 - 139MM HG: CPT | Performed by: UROLOGY

## 2020-07-17 PROCEDURE — 1036F TOBACCO NON-USER: CPT | Performed by: UROLOGY

## 2020-07-17 PROCEDURE — 3078F DIAST BP <80 MM HG: CPT | Performed by: UROLOGY

## 2020-07-17 NOTE — LETTER
July 17, 2020     Karen Wilson MD  [address]  [address] [facility]    Patient: Kamran [facility]   YOB: 1960   Date of Visit: 7/17/2020       Dear Dr Riggins: Thank you for referring Elgin Jameson to me for evaluation  Below are my notes for this consultation  If you have questions, please do not hesitate to call me  I look forward to following your patient along with you  Sincerely,        Karly Atkins MD        CC: MD Karly Chase Ace, MD  7/17/2020 11:32 AM  Incomplete       Problem List Items Addressed This Visit        Genitourinary    Bladder wall thickening - Primary    Bladder tumor    Cystitis glandularis            Discussion:  Nelsy Henry has healed very well from his TURBT, this does not show urothelial carcinoma, but does show 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  I do recommend surveillance cystoscopy in 6 months, to survey this area, we will continue this for a number years, and then consider yearly cystoscopy there after as there is a small risk of malignant transformation of this pathologic finding  All questions and concerns answered and addressed  Assessment and plan:       Please see problem oriented charting for the assessment plan of today's urological complaints    aKrly Atkins MD      Chief Complaint     Chief Complaint   Patient presents with    Post-op         History of Present Illness     Diana Topete is a 61 y o  man originally referred to me in consultation by Dr Jennifer Thacker for bladder wall thickening, 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  He is status post resection of the abnormal appearing area by way of a TURBT some weeks ago, this shows no malignant findings but does show 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      I reviewed with him the recommendation for surveillance cystoscopy intermittently, after this discussion he wishes to proceed with this  New urologic complaints today include  The following portions of the patient's history were reviewed and updated as appropriate: allergies, current medications, past family history, past medical history, past social history, past surgical history and problem list       Detailed Urologic History     - please refer to HPI    Review of Systems     Review of Systems   Constitutional: Negative  HENT: Negative  Eyes: Negative  Respiratory: Negative  Cardiovascular: Negative  Gastrointestinal: Negative  Endocrine: Negative  Genitourinary: Negative  Musculoskeletal: Negative  Skin: Negative  Allergic/Immunologic: Negative  Neurological: Negative  Hematological: Negative  Psychiatric/Behavioral: Negative  Allergies     No Known Allergies    Physical Exam     Physical Exam   Constitutional: He is oriented to person, place, and time  He appears well-developed and well-nourished  No distress  HENT:   Head: Normocephalic and atraumatic  Eyes: Right eye exhibits no discharge  Left eye exhibits no discharge  Neck: No tracheal deviation present  Cardiovascular: Intact distal pulses  Pulmonary/Chest: Effort normal  No stridor  No respiratory distress  Abdominal: He exhibits no distension  Musculoskeletal: He exhibits no edema, tenderness or deformity  Neurological: He is alert and oriented to person, place, and time  No cranial nerve deficit  Coordination normal    Skin: Skin is dry  No rash noted  He is not diaphoretic  Psychiatric: He has a normal mood and affect  His behavior is normal  Judgment and thought content normal    Nursing note and vitals reviewed            Vital Signs  Vitals:    07/17/20 1102   BP: 138/76   Pulse: 84   Temp: (!) 97 °F (36 1 °C)   Weight: 93 kg (205 lb)   Height: 5' 6" (1 676 m)         Current Medications       Current Outpatient Medications:     amLODIPine (NORVASC) 10 mg tablet, Take 10 mg by mouth daily  , Disp: , Rfl:     Ascorbic Acid (VITAMIN C) 500 MG CAPS, Take 1 tablet by mouth daily, Disp: , Rfl:     aspirin 81 MG tablet, Take 81 mg by mouth daily  , Disp: , Rfl:     atorvastatin (LIPITOR) 40 mg tablet, Take 40 mg by mouth daily at bedtime, Disp: , Rfl: 4    B-D UF III MINI PEN NEEDLES 31G X 5 MM MISC, USE 1 DAILY, Disp: , Rfl: 5    Cholecalciferol (CVS VITAMIN D) 2000 units CAPS, Take 2 tablets by mouth 2 (two) times a day, Disp: , Rfl:     Cinnamon 500 MG TABS, Take by mouth, Disp: , Rfl:     coenzyme Q-10 100 MG capsule, Take 2 capsules by mouth daily, Disp: , Rfl:     glipiZIDE (GLUCOTROL XL) 10 mg 24 hr tablet, Take 10 mg by mouth daily, Disp: , Rfl:     insulin aspart (NovoLOG) 100 units/mL injection, Inject 8 Units under the skin daily , Disp: , Rfl:     Insulin Glargine (BASAGLAR KWIKPEN SC), Inject 70 Units under the skin daily, Disp: , Rfl:     JANUVIA 100 MG tablet, Take 100 mg by mouth daily, Disp: , Rfl: 2    metFORMIN (FORTAMET) 1000 MG (OSM) 24 hr tablet, Take 1,000 mg by mouth 2 (two) times a day, Disp: , Rfl: 2    metoprolol succinate (TOPROL-XL) 100 mg 24 hr tablet, Take 100 mg by mouth daily, Disp: , Rfl:     Multiple Vitamin (MULTI-VITAMIN DAILY) TABS, Take 1 tablet by mouth daily, Disp: , Rfl:     olmesartan (BENICAR) 40 mg tablet, Take 40 mg by mouth daily , Disp: , Rfl:     pyridostigmine (MESTINON) 60 mg tablet, Take 60 mg by mouth 3 (three) times a day , Disp: , Rfl:     triamterene-hydrochlorothiazide (MAXZIDE-25) 37 5-25 mg per tablet, Take 1 tablet by mouth daily, Disp: , Rfl:     VASCEPA 1 g CAPS, TAKE 1 CAPSULE BY MOUTH TWICE A DAY WITH MEALS, Disp: , Rfl: 2    docusate sodium (COLACE) 100 mg capsule, Take 1 capsule (100 mg total) by mouth 3 (three) times a day for 7 days, Disp: 21 capsule, Rfl: 0      Active Problems     Patient Active Problem List   Diagnosis    Bladder wall thickening    Abnormal CT scan, bladder    Essential hypertension    Diabetes mellitus without complication ([address] )    Hypercholesterolemia    JERO (obstructive sleep apnea)    Myasthenia gravis (HCC)    History of malignant carcinoid tumor of rectum    Bladder tumor    Cystitis glandularis         Past Medical History     Past Medical History:   Diagnosis Date    Carcinoid tumor of colon     Colon polyp     CPAP (continuous positive airway pressure) dependence     Cyst of skin and subcutaneous tissue     back    Diabetes mellitus ([address] )     Hyperlipidemia     Hypertension     Myasthenia gravis ([address] )     Sleep apnea          Surgical History     Past Surgical History:   Procedure Laterality Date    BACK SURGERY  01/01/2001    lump removed     COLONOSCOPY      1/1/12,1/1/2016 tumor removed     DENTAL SURGERY      FL RETROGRADE PYELOGRAM  6/25/2020    NASAL POLYP EXCISION      MO COLONOSCOPY FLX DX W/COLLJ SPEC WHEN PFRMD N/A 7/19/2016    Procedure: COLONOSCOPY;  Surgeon: Daria Salomon MD;  [facility]; Service: Gastroenterology    MO CYSTOURETHROSCOPY,FULGUR <0 5 CM LESN N/A 6/25/2020    Procedure: TRANSURETHRAL RESECTION OF BLADDER TUMOR (TURBT); Surgeon: Shyam Hay MD;  [facility];  Service: Urology    MO CYSTOURETHROSCOPY,URETER CATHETER Bilateral 6/25/2020    Procedure: CYSTOSCOPY WITH RETROGRADE PYELOGRAM;  Surgeon: Shyam Hay MD;  [facility];  Service: Urology         Family History     Family History   Problem Relation Age of Onset    Diabetes Mother     Heart disease Father     Heart failure Father     Prostate cancer Father          Social History     Social History     Social History     Tobacco Use   Smoking Status Never Smoker   Smokeless Tobacco Never Used         Pertinent Lab Values     Lab Results   Component Value Date    CREATININE 1 01 06/04/2020       Lab Results   Component Value Date    PSA 1 9 06/04/2020         Final Diagnosis   A   Bladder, Posterior, TURBT:  - 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  - Unremarkable muscularis propria present  Pertinent Imaging      No new imaging for my review    Alicia Harding MD  7/17/2020 11:32 AM  Sign at close encounter       Problem List Items Addressed This Visit        Genitourinary    Bladder wall thickening - Primary    Bladder tumor    Cystitis glandularis            Discussion:  Leann Chamberlain has healed very well from his TURBT, this does not show urothelial carcinoma, but does show 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  I do recommend surveillance cystoscopy in 6 months, to survey this area, we will continue this for a number years, and then consider yearly cystoscopy there after as there is a small risk of malignant transformation of this pathologic finding  All questions and concerns answered and addressed  Assessment and plan:       Please see problem oriented charting for the assessment plan of today's urological complaints    Alicia Harding MD      Chief Complaint     Chief Complaint   Patient presents with    Post-op         History of Present Illness     Sheryl Cote is a 61 y o  man originally referred to me in consultation by Dr Lorena Bhatt the for bladder wall thickening, 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  He is status post resection of the abnormal appearing area by way of a TURBT some weeks ago, this shows no malignant findings but does show 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  I reviewed with him the recommendation for surveillance cystoscopy intermittently, after this discussion he wishes to proceed with this  New urologic complaints today include      The following portions of the patient's history were reviewed and updated as appropriate: allergies, current medications, past family history, past medical history, past social history, past surgical history and problem list       Detailed Urologic History     - please refer to HPI    Review of Systems     Review of Systems   Constitutional: Negative  HENT: Negative  Eyes: Negative  Respiratory: Negative  Cardiovascular: Negative  Gastrointestinal: Negative  Endocrine: Negative  Genitourinary: Negative  Musculoskeletal: Negative  Skin: Negative  Allergic/Immunologic: Negative  Neurological: Negative  Hematological: Negative  Psychiatric/Behavioral: Negative  Allergies     No Known Allergies    Physical Exam     Physical Exam   Constitutional: He is oriented to person, place, and time  He appears well-developed and well-nourished  No distress  HENT:   Head: Normocephalic and atraumatic  Eyes: Right eye exhibits no discharge  Left eye exhibits no discharge  Neck: No tracheal deviation present  Cardiovascular: Intact distal pulses  Pulmonary/Chest: Effort normal  No stridor  No respiratory distress  Abdominal: He exhibits no distension  Musculoskeletal: He exhibits no edema, tenderness or deformity  Neurological: He is alert and oriented to person, place, and time  No cranial nerve deficit  Coordination normal    Skin: Skin is dry  No rash noted  He is not diaphoretic  Psychiatric: He has a normal mood and affect  His behavior is normal  Judgment and thought content normal    Nursing note and vitals reviewed  Vital Signs  Vitals:    07/17/20 1102   BP: 138/76   Pulse: 84   Temp: (!) 97 °F (36 1 °C)   Weight: 93 kg (205 lb)   Height: 5' 6" (1 676 m)         Current Medications       Current Outpatient Medications:     amLODIPine (NORVASC) 10 mg tablet, Take 10 mg by mouth daily  , Disp: , Rfl:     Ascorbic Acid (VITAMIN C) 500 MG CAPS, Take 1 tablet by mouth daily, Disp: , Rfl:     aspirin 81 MG tablet, Take 81 mg by mouth daily  , Disp: , Rfl:     atorvastatin (LIPITOR) 40 mg tablet, Take 40 mg by mouth daily at bedtime, Disp: , Rfl: 4    B-D UF III MINI PEN NEEDLES 31G X 5 MM MISC, USE 1 DAILY, Disp: , Rfl: 5    Cholecalciferol (CVS VITAMIN D) 2000 units CAPS, Take 2 tablets by mouth 2 (two) times a day, Disp: , Rfl:     Cinnamon 500 MG TABS, Take by mouth, Disp: , Rfl:     coenzyme Q-10 100 MG capsule, Take 2 capsules by mouth daily, Disp: , Rfl:     glipiZIDE (GLUCOTROL XL) 10 mg 24 hr tablet, Take 10 mg by mouth daily, Disp: , Rfl:     insulin aspart (NovoLOG) 100 units/mL injection, Inject 8 Units under the skin daily , Disp: , Rfl:     Insulin Glargine (BASAGLAR KWIKPEN SC), Inject 70 Units under the skin daily, Disp: , Rfl:     JANUVIA 100 MG tablet, Take 100 mg by mouth daily, Disp: , Rfl: 2    metFORMIN (FORTAMET) 1000 MG (OSM) 24 hr tablet, Take 1,000 mg by mouth 2 (two) times a day, Disp: , Rfl: 2    metoprolol succinate (TOPROL-XL) 100 mg 24 hr tablet, Take 100 mg by mouth daily, Disp: , Rfl:     Multiple Vitamin (MULTI-VITAMIN DAILY) TABS, Take 1 tablet by mouth daily, Disp: , Rfl:     olmesartan (BENICAR) 40 mg tablet, Take 40 mg by mouth daily , Disp: , Rfl:     pyridostigmine (MESTINON) 60 mg tablet, Take 60 mg by mouth 3 (three) times a day , Disp: , Rfl:     triamterene-hydrochlorothiazide (MAXZIDE-25) 37 5-25 mg per tablet, Take 1 tablet by mouth daily, Disp: , Rfl:     VASCEPA 1 g CAPS, TAKE 1 CAPSULE BY MOUTH TWICE A DAY WITH MEALS, Disp: , Rfl: 2    docusate sodium (COLACE) 100 mg capsule, Take 1 capsule (100 mg total) by mouth 3 (three) times a day for 7 days, Disp: 21 capsule, Rfl: 0      Active Problems     Patient Active Problem List   Diagnosis    Bladder wall thickening    Abnormal CT scan, bladder    Essential hypertension    Diabetes mellitus without complication (HCC)    Hypercholesterolemia    JERO (obstructive sleep apnea)    Myasthenia gravis (HCC)    History of malignant carcinoid tumor of rectum    Bladder tumor    Cystitis glandularis         Past Medical History     Past Medical History:   Diagnosis Date    Carcinoid tumor of colon     Colon polyp     CPAP (continuous positive airway pressure) dependence     Cyst of skin and subcutaneous tissue     back    [facility]     Hyperlipidemia     Hypertension     Myasthenia gravis ([facility]     Sleep apnea          Surgical History     Past Surgical History:   Procedure Laterality Date    BACK SURGERY  01/01/2001    lump removed     COLONOSCOPY      1/1/12,1/1/2016 tumor removed     DENTAL SURGERY      FL RETROGRADE PYELOGRAM  6/25/2020    NASAL POLYP EXCISION      GA COLONOSCOPY FLX DX W/COLLJ SPEC WHEN PFRMD N/A 7/19/2016    Procedure: COLONOSCOPY;  Surgeon: Nichole Cota MD;  [facility]; Service: Gastroenterology    GA CYSTOURETHROSCOPY,FULGUR <0 5 CM LESN N/A 6/25/2020    Procedure: TRANSURETHRAL RESECTION OF BLADDER TUMOR (TURBT); Surgeon: Pepito Centeno MD;  [facility];  Service: Urology    GA CYSTOURETHROSCOPY,URETER CATHETER Bilateral 6/25/2020    Procedure: CYSTOSCOPY WITH RETROGRADE PYELOGRAM;  Surgeon: Pepito Centeno MD;  [facility];  Service: Urology         Family History     Family History   Problem Relation Age of Onset    Diabetes Mother     Heart disease Father     Heart failure Father     Prostate cancer Father          Social History     Social History     Social History     Tobacco Use   Smoking Status Never Smoker   Smokeless Tobacco Never Used         Pertinent Lab Values     Lab Results   Component Value Date    CREATININE 1 01 06/04/2020       Lab Results   Component Value Date    PSA 1 9 06/04/2020         Final Diagnosis   A  Bladder, Posterior, TURBT:  - 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

## 2020-07-23 DIAGNOSIS — I10 ESSENTIAL (PRIMARY) HYPERTENSION: ICD-10-CM

## 2020-07-23 PROCEDURE — 4010F ACE/ARB THERAPY RXD/TAKEN: CPT | Performed by: UROLOGY

## 2020-07-23 RX ORDER — OLMESARTAN MEDOXOMIL 40 MG/1
TABLET ORAL
Qty: 90 TABLET | Refills: 2 | Status: SHIPPED | OUTPATIENT
Start: 2020-07-23 | End: 2021-04-15 | Stop reason: SDUPTHER

## 2020-08-12 DIAGNOSIS — I10 ESSENTIAL HYPERTENSION: Primary | ICD-10-CM

## 2020-08-12 RX ORDER — METOPROLOL SUCCINATE 100 MG/1
TABLET, EXTENDED RELEASE ORAL
Qty: 90 TABLET | Refills: 3 | Status: SHIPPED | OUTPATIENT
Start: 2020-08-12 | End: 2021-08-05

## 2020-09-23 ENCOUNTER — APPOINTMENT (OUTPATIENT)
Dept: LAB | Facility: AMBULARY SURGERY CENTER | Age: 60
End: 2020-09-23
Payer: COMMERCIAL

## 2020-09-23 DIAGNOSIS — D3A.029 CARCINOID TUMOR OF LARGE INTESTINE, UNSPECIFIED LOCATION, UNSPECIFIED WHETHER MALIGNANT: ICD-10-CM

## 2020-09-23 DIAGNOSIS — D50.9 MICROCYTIC ANEMIA: ICD-10-CM

## 2020-09-23 LAB
ALBUMIN SERPL BCP-MCNC: 4.3 G/DL (ref 3.5–5)
ALP SERPL-CCNC: 79 U/L (ref 46–116)
ALT SERPL W P-5'-P-CCNC: 39 U/L (ref 12–78)
ANION GAP SERPL CALCULATED.3IONS-SCNC: 5 MMOL/L (ref 4–13)
AST SERPL W P-5'-P-CCNC: 25 U/L (ref 5–45)
BASOPHILS # BLD AUTO: 0.06 THOUSANDS/ΜL (ref 0–0.1)
BASOPHILS NFR BLD AUTO: 1 % (ref 0–1)
BILIRUB SERPL-MCNC: 0.64 MG/DL (ref 0.2–1)
BUN SERPL-MCNC: 17 MG/DL (ref 5–25)
CALCIUM SERPL-MCNC: 10 MG/DL (ref 8.3–10.1)
CHLORIDE SERPL-SCNC: 107 MMOL/L (ref 100–108)
CO2 SERPL-SCNC: 31 MMOL/L (ref 21–32)
CREAT SERPL-MCNC: 1.09 MG/DL (ref 0.6–1.3)
EOSINOPHIL # BLD AUTO: 0.11 THOUSAND/ΜL (ref 0–0.61)
EOSINOPHIL NFR BLD AUTO: 2 % (ref 0–6)
ERYTHROCYTE [DISTWIDTH] IN BLOOD BY AUTOMATED COUNT: 13.3 % (ref 11.6–15.1)
FERRITIN SERPL-MCNC: 64 NG/ML (ref 8–388)
GFR SERPL CREATININE-BSD FRML MDRD: 85 ML/MIN/1.73SQ M
GLUCOSE SERPL-MCNC: 84 MG/DL (ref 65–140)
HBA1C MFR BLD HPLC: 6.5 %
HCT VFR BLD AUTO: 44.5 % (ref 36.5–49.3)
HGB BLD-MCNC: 14.1 G/DL (ref 12–17)
IMM GRANULOCYTES # BLD AUTO: 0.02 THOUSAND/UL (ref 0–0.2)
IMM GRANULOCYTES NFR BLD AUTO: 0 % (ref 0–2)
IRON SATN MFR SERPL: 18 %
IRON SERPL-MCNC: 62 UG/DL (ref 65–175)
LYMPHOCYTES # BLD AUTO: 1.44 THOUSANDS/ΜL (ref 0.6–4.47)
LYMPHOCYTES NFR BLD AUTO: 27 % (ref 14–44)
MCH RBC QN AUTO: 27.5 PG (ref 26.8–34.3)
MCHC RBC AUTO-ENTMCNC: 31.7 G/DL (ref 31.4–37.4)
MCV RBC AUTO: 87 FL (ref 82–98)
MONOCYTES # BLD AUTO: 0.73 THOUSAND/ΜL (ref 0.17–1.22)
MONOCYTES NFR BLD AUTO: 14 % (ref 4–12)
NEUTROPHILS # BLD AUTO: 2.97 THOUSANDS/ΜL (ref 1.85–7.62)
NEUTS SEG NFR BLD AUTO: 56 % (ref 43–75)
NRBC BLD AUTO-RTO: 0 /100 WBCS
PLATELET # BLD AUTO: 282 THOUSANDS/UL (ref 149–390)
PMV BLD AUTO: 10.9 FL (ref 8.9–12.7)
POTASSIUM SERPL-SCNC: 4 MMOL/L (ref 3.5–5.3)
PROT SERPL-MCNC: 7.8 G/DL (ref 6.4–8.2)
RBC # BLD AUTO: 5.13 MILLION/UL (ref 3.88–5.62)
SODIUM SERPL-SCNC: 143 MMOL/L (ref 136–145)
TIBC SERPL-MCNC: 344 UG/DL (ref 250–450)
WBC # BLD AUTO: 5.33 THOUSAND/UL (ref 4.31–10.16)

## 2020-09-23 PROCEDURE — 83918 ORGANIC ACIDS TOTAL QUANT: CPT

## 2020-09-23 PROCEDURE — 85025 COMPLETE CBC W/AUTO DIFF WBC: CPT

## 2020-09-23 PROCEDURE — 82728 ASSAY OF FERRITIN: CPT

## 2020-09-23 PROCEDURE — 3044F HG A1C LEVEL LT 7.0%: CPT | Performed by: FAMILY MEDICINE

## 2020-09-23 PROCEDURE — 83540 ASSAY OF IRON: CPT

## 2020-09-23 PROCEDURE — 80053 COMPREHEN METABOLIC PANEL: CPT

## 2020-09-23 PROCEDURE — 36415 COLL VENOUS BLD VENIPUNCTURE: CPT

## 2020-09-23 PROCEDURE — 83550 IRON BINDING TEST: CPT

## 2020-09-26 LAB
METHYLMALONATE SERPL-SCNC: 66 NMOL/L (ref 0–378)
SL AMB DISCLAIMER: NORMAL

## 2020-09-28 ENCOUNTER — TELEPHONE (OUTPATIENT)
Dept: HEMATOLOGY ONCOLOGY | Facility: CLINIC | Age: 60
End: 2020-09-28

## 2020-09-28 NOTE — TELEPHONE ENCOUNTER
Reschedule Appointment     Who is calling in  Patient    Doctor Appointment Scheduled with Dr Carlo Stevens date and time 10/27 at 1:40pm   New date and time 11/03 at 2:40pm    Location [facility]   Patient verbalized understanding    Yes

## 2020-09-30 DIAGNOSIS — I10 ESSENTIAL HYPERTENSION: Primary | ICD-10-CM

## 2020-09-30 RX ORDER — AMLODIPINE BESYLATE 10 MG/1
TABLET ORAL
Qty: 90 TABLET | Refills: 2 | Status: SHIPPED | OUTPATIENT
Start: 2020-09-30 | End: 2021-06-22

## 2020-10-19 ENCOUNTER — OFFICE VISIT (OUTPATIENT)
Dept: FAMILY MEDICINE CLINIC | Facility: CLINIC | Age: 60
End: 2020-10-19
Payer: COMMERCIAL

## 2020-10-19 VITALS
RESPIRATION RATE: 16 BRPM | TEMPERATURE: 97.3 F | OXYGEN SATURATION: 100 % | HEART RATE: 90 BPM | HEIGHT: 66 IN | SYSTOLIC BLOOD PRESSURE: 140 MMHG | BODY MASS INDEX: 33.46 KG/M2 | WEIGHT: 208.2 LBS | DIASTOLIC BLOOD PRESSURE: 70 MMHG

## 2020-10-19 DIAGNOSIS — I10 ESSENTIAL HYPERTENSION: ICD-10-CM

## 2020-10-19 DIAGNOSIS — E11.9 DIABETES MELLITUS WITHOUT COMPLICATION (HCC): ICD-10-CM

## 2020-10-19 DIAGNOSIS — Z12.5 PROSTATE CANCER SCREENING: Primary | ICD-10-CM

## 2020-10-19 DIAGNOSIS — G47.33 OSA (OBSTRUCTIVE SLEEP APNEA): ICD-10-CM

## 2020-10-19 DIAGNOSIS — G70.00 MYASTHENIA GRAVIS (HCC): ICD-10-CM

## 2020-10-19 DIAGNOSIS — E78.5 DYSLIPIDEMIA: ICD-10-CM

## 2020-10-19 PROCEDURE — 3078F DIAST BP <80 MM HG: CPT | Performed by: FAMILY MEDICINE

## 2020-10-19 PROCEDURE — 1036F TOBACCO NON-USER: CPT | Performed by: FAMILY MEDICINE

## 2020-10-19 PROCEDURE — 99214 OFFICE O/P EST MOD 30 MIN: CPT | Performed by: FAMILY MEDICINE

## 2020-10-19 PROCEDURE — 3725F SCREEN DEPRESSION PERFORMED: CPT | Performed by: FAMILY MEDICINE

## 2020-10-19 RX ORDER — PEN NEEDLE, DIABETIC 32GX 5/32"
NEEDLE, DISPOSABLE MISCELLANEOUS 2 TIMES DAILY
COMMUNITY
Start: 2020-09-03

## 2020-10-19 RX ORDER — INSULIN GLARGINE 100 [IU]/ML
60 INJECTION, SOLUTION SUBCUTANEOUS DAILY
COMMUNITY
Start: 2020-07-12

## 2020-10-19 RX ORDER — INSULIN ASPART 100 [IU]/ML
INJECTION, SOLUTION INTRAVENOUS; SUBCUTANEOUS
COMMUNITY
Start: 2020-08-21 | End: 2021-11-16 | Stop reason: ALTCHOICE

## 2020-10-19 RX ORDER — METFORMIN HYDROCHLORIDE 500 MG/1
1000 TABLET, EXTENDED RELEASE ORAL 2 TIMES DAILY
COMMUNITY
Start: 2020-08-23 | End: 2020-10-19

## 2020-10-19 RX ORDER — BLOOD SUGAR DIAGNOSTIC
STRIP MISCELLANEOUS
COMMUNITY
Start: 2020-08-17

## 2020-11-03 ENCOUNTER — OFFICE VISIT (OUTPATIENT)
Dept: HEMATOLOGY ONCOLOGY | Facility: CLINIC | Age: 60
End: 2020-11-03
Payer: COMMERCIAL

## 2020-11-03 VITALS
RESPIRATION RATE: 16 BRPM | DIASTOLIC BLOOD PRESSURE: 86 MMHG | TEMPERATURE: 97.8 F | HEIGHT: 66 IN | WEIGHT: 209 LBS | OXYGEN SATURATION: 96 % | HEART RATE: 92 BPM | SYSTOLIC BLOOD PRESSURE: 164 MMHG | BODY MASS INDEX: 33.59 KG/M2

## 2020-11-03 DIAGNOSIS — D3A.029 CARCINOID TUMOR OF LARGE INTESTINE, UNSPECIFIED LOCATION, UNSPECIFIED WHETHER MALIGNANT: Primary | ICD-10-CM

## 2020-11-03 DIAGNOSIS — D50.8 IRON DEFICIENCY ANEMIA SECONDARY TO INADEQUATE DIETARY IRON INTAKE: ICD-10-CM

## 2020-11-03 PROCEDURE — 99214 OFFICE O/P EST MOD 30 MIN: CPT | Performed by: INTERNAL MEDICINE

## 2020-11-03 PROCEDURE — 3077F SYST BP >= 140 MM HG: CPT | Performed by: INTERNAL MEDICINE

## 2020-11-03 PROCEDURE — 3079F DIAST BP 80-89 MM HG: CPT | Performed by: INTERNAL MEDICINE

## 2020-11-03 PROCEDURE — 3008F BODY MASS INDEX DOCD: CPT | Performed by: INTERNAL MEDICINE

## 2020-11-17 LAB
LEFT EYE DIABETIC RETINOPATHY: NORMAL
RIGHT EYE DIABETIC RETINOPATHY: NORMAL

## 2020-11-17 PROCEDURE — 2022F DILAT RTA XM EVC RTNOPTHY: CPT | Performed by: INTERNAL MEDICINE

## 2021-01-08 NOTE — PROGRESS NOTES
Problem List Items Addressed This Visit        Genitourinary    Cystitis glandularis - Primary    Relevant Orders    POCT urine dip (Completed)       Other    History of malignant carcinoid tumor of rectum    Relevant Orders    POCT urine dip (Completed)            Discussion:    Bartolo Fernández is doing well, ECOG performance status of 0, no voiding symptoms at this time, previous TURBT with was performed for concern of direct invasion of the bladder with carcinoid tumor showed only cystitis cystica and cystitis glandularis with some intestinal metaplasia  As this does have the potential for malignant transformation surveillance cystoscopy is recommended  Surveillance cystoscopy today is negative for new lesions, he will follow up in 6 months for surveillance cystoscopy purposes  Given the lack of definitive guidelines for the management of this condition, if a number of 6 month surveillance cystoscopies are negative, we can move to annual examinations, if those are negative for a period of time we can discontinue surveillance      Assessment and plan:     Please see problem oriented charting for the assessment plan of today's urological complaints    Sonja Duarte MD      Chief Complaint     Chief Complaint   Patient presents with    Cystitis glandularis    Cystoscopy         History of Present Illness     Eliu Cerna is a 61 y o  gentleman with a history of rectal carcinoid tumor in 2011, he was seen to have abnormal findings on his bladder on a CT scan, cystoscopy confirmed this, he was taken for TURBT, this did not show recurrent tumor but did show some cystitis cystica and glandularis  Surveillance cystoscopy today shows no recurrence, clean mucosa, no concerning findings  New urologic complaints include none, voiding well  He was seen by medical oncology in November 2020, found to have no evidence of disease, with the plan for follow-up in 1 year with imaging prior      The following portions of the patient's history were reviewed and updated as appropriate: allergies, current medications, past family history, past medical history, past social history, past surgical history and problem list         Detailed Urologic History     - please refer to HPI    Review of Systems     Review of Systems   Constitutional: Negative  HENT: Negative  Eyes: Negative  Respiratory: Negative  Cardiovascular: Negative  Gastrointestinal: Negative  Endocrine: Negative  Genitourinary: Negative  Musculoskeletal: Negative  Skin: Negative  Allergic/Immunologic: Negative  Neurological: Negative  Hematological: Negative  Psychiatric/Behavioral: Negative  Allergies     No Known Allergies    Physical Exam     Physical Exam  Vitals signs reviewed  Constitutional:       General: He is not in acute distress  Appearance: Normal appearance  He is not ill-appearing, toxic-appearing or diaphoretic  HENT:      Head: Normocephalic and atraumatic  Eyes:      General: No scleral icterus  Right eye: No discharge  Left eye: No discharge  Cardiovascular:      Pulses: Normal pulses  Pulmonary:      Effort: Pulmonary effort is normal    Abdominal:      General: There is no distension  Genitourinary:     Penis: Normal     Musculoskeletal:         General: No swelling  Skin:     General: Skin is warm  Neurological:      General: No focal deficit present  Mental Status: He is alert and oriented to person, place, and time  Psychiatric:         Mood and Affect: Mood normal          Behavior: Behavior normal          Thought Content:  Thought content normal          Judgment: Judgment normal              Vital Signs  Vitals:    01/11/21 0856   BP: 158/88   BP Location: Left arm   Patient Position: Sitting   Cuff Size: Standard   Pulse: 90   Weight: 95 3 kg (210 lb)   Height: 5' 6" (1 676 m)         Current Medications       Current Outpatient Medications:    Accu-Chek Danielle Plus test strip, TEST BLOOD SUGARS ONCE DAILY, Disp: , Rfl:     amLODIPine (NORVASC) 10 mg tablet, TAKE 1 TABLET EVERY DAY, Disp: 90 tablet, Rfl: 2    Ascorbic Acid (VITAMIN C) 500 MG CAPS, Take 1 tablet by mouth daily, Disp: , Rfl:     aspirin 81 MG tablet, Take 81 mg by mouth daily  , Disp: , Rfl:     atorvastatin (LIPITOR) 40 mg tablet, Take 40 mg by mouth daily at bedtime, Disp: , Rfl: 4    B-D UF III MINI PEN NEEDLES 31G X 5 MM MISC, USE 1 DAILY, Disp: , Rfl: 5    Basaglar KwikPen 100 units/mL injection pen, INJECT UP  UNITS DAILY AS DIRECTED, Disp: , Rfl:     BD Pen Needle Nelsy U/F 32G X 4 MM MISC, 2 (two) times a day Test, Disp: , Rfl:     Cholecalciferol (CVS VITAMIN D) 2000 units CAPS, Take 2 tablets by mouth 2 (two) times a day, Disp: , Rfl:     Cinnamon 500 MG TABS, Take by mouth, Disp: , Rfl:     coenzyme Q-10 100 MG capsule, Take 2 capsules by mouth daily, Disp: , Rfl:     glipiZIDE (GLUCOTROL XL) 10 mg 24 hr tablet, Take 10 mg by mouth daily, Disp: , Rfl:     JANUVIA 100 MG tablet, Take 100 mg by mouth daily, Disp: , Rfl: 2    metFORMIN (GLUCOPHAGE-XR) 500 mg 24 hr tablet, Take 1,000 mg by mouth 2 (two) times a day, Disp: , Rfl:     metoprolol succinate (TOPROL-XL) 100 mg 24 hr tablet, TAKE 1 TABLET BY MOUTH DAILY, Disp: 90 tablet, Rfl: 3    Multiple Vitamin (MULTI-VITAMIN DAILY) TABS, Take 1 tablet by mouth daily, Disp: , Rfl:     NovoLOG FlexPen 100 units/mL injection pen, INJECT 8 UNITS SUBCUTANEOUSLY BEFORE DINNER, Disp: , Rfl:     olmesartan (BENICAR) 40 mg tablet, TAKE 1 TABLET BY MOUTH EVERY DAY, Disp: 90 tablet, Rfl: 2    pyridostigmine (MESTINON) 60 mg tablet, Take 60 mg by mouth 3 (three) times a day , Disp: , Rfl:     triamterene-hydrochlorothiazide (MAXZIDE-25) 37 5-25 mg per tablet, Take 1 tablet by mouth daily, Disp: , Rfl:     VASCEPA 1 g CAPS, TAKE 1 CAPSULE BY MOUTH TWICE A DAY WITH MEALS, Disp: , Rfl: 2    docusate sodium (COLACE) 100 mg capsule, Take 1 capsule (100 mg total) by mouth 3 (three) times a day for 7 days (Patient not taking: Reported on 1/11/2021), Disp: 21 capsule, Rfl: 0    metFORMIN (FORTAMET) 1000 MG (OSM) 24 hr tablet, Take 1,000 mg by mouth 2 (two) times a day, Disp: , Rfl: 2      Active Problems     Patient Active Problem List   Diagnosis    Bladder wall thickening    Essential hypertension    Diabetes mellitus without complication ([facility] )    Dyslipidemia    JERO (obstructive sleep apnea)    [facility]    History of malignant carcinoid tumor of rectum    Cystitis glandularis         Past Medical History     Past Medical History:   Diagnosis Date    Carcinoid tumor of colon     Colon polyp     CPAP (continuous positive airway pressure) dependence     Cyst of skin and subcutaneous tissue     back    Hyperlipidemia     [facility]     Sleep apnea          Surgical History     Past Surgical History:   Procedure Laterality Date    BACK SURGERY  01/01/2001    lump removed     COLONOSCOPY      1/1/12,1/1/2016 tumor removed     DENTAL SURGERY      FL RETROGRADE PYELOGRAM  6/25/2020    NASAL POLYP EXCISION      SC COLONOSCOPY FLX DX W/COLLJ SPEC WHEN PFRMD N/A 7/19/2016    Procedure: COLONOSCOPY;  Surgeon: Akua Jacobo MD;  [facility]; Service: Gastroenterology    SC CYSTOURETHROSCOPY,FULGUR <0 5 CM LESN N/A 6/25/2020    Procedure: TRANSURETHRAL RESECTION OF BLADDER TUMOR (TURBT);   Surgeon: Josué Banegas MD;  [facility];  Service: Urology    SC CYSTOURETHROSCOPY,URETER CATHETER Bilateral 6/25/2020    Procedure: CYSTOSCOPY WITH RETROGRADE PYELOGRAM;  Surgeon: Josué Banegas MD;  [facility];  Service: Urology         Family History     Family History   Problem Relation Age of Onset    Diabetes Mother     Heart disease Father     Heart failure Father     Prostate cancer Father          Social History     Social History     Social History     Tobacco Use Smoking Status Never Smoker   Smokeless Tobacco Never Used         Pertinent Lab Values     Lab Results   Component Value Date    CREATININE 1 09 09/23/2020       Lab Results   Component Value Date    PSA 1 9 06/04/2020             Pertinent Imaging      No new imaging for my review

## 2021-01-08 NOTE — PATIENT INSTRUCTIONS

## 2021-01-11 ENCOUNTER — PROCEDURE VISIT (OUTPATIENT)
Dept: UROLOGY | Facility: CLINIC | Age: 61
End: 2021-01-11
Payer: COMMERCIAL

## 2021-01-11 VITALS
SYSTOLIC BLOOD PRESSURE: 158 MMHG | HEIGHT: 66 IN | HEART RATE: 90 BPM | DIASTOLIC BLOOD PRESSURE: 88 MMHG | BODY MASS INDEX: 33.75 KG/M2 | WEIGHT: 210 LBS

## 2021-01-11 DIAGNOSIS — Z71.2 ENCOUNTER TO DISCUSS TEST RESULTS: ICD-10-CM

## 2021-01-11 DIAGNOSIS — N30.80 CYSTITIS GLANDULARIS: Primary | ICD-10-CM

## 2021-01-11 DIAGNOSIS — Z85.040 HISTORY OF MALIGNANT CARCINOID TUMOR OF RECTUM: ICD-10-CM

## 2021-01-11 LAB
SL AMB  POCT GLUCOSE, UA: NORMAL
SL AMB LEUKOCYTE ESTERASE,UA: NORMAL
SL AMB POCT BILIRUBIN,UA: NORMAL
SL AMB POCT BLOOD,UA: NORMAL
SL AMB POCT CLARITY,UA: CLEAR
SL AMB POCT COLOR,UA: YELLOW
SL AMB POCT KETONES,UA: NORMAL
SL AMB POCT NITRITE,UA: NORMAL
SL AMB POCT PH,UA: 5
SL AMB POCT SPECIFIC GRAVITY,UA: 1.03
SL AMB POCT URINE PROTEIN: 30
SL AMB POCT UROBILINOGEN: 0.2

## 2021-01-11 PROCEDURE — 99213 OFFICE O/P EST LOW 20 MIN: CPT | Performed by: UROLOGY

## 2021-01-11 PROCEDURE — 1036F TOBACCO NON-USER: CPT | Performed by: UROLOGY

## 2021-01-11 PROCEDURE — 52000 CYSTOURETHROSCOPY: CPT | Performed by: UROLOGY

## 2021-01-11 PROCEDURE — 81002 URINALYSIS NONAUTO W/O SCOPE: CPT | Performed by: UROLOGY

## 2021-01-11 RX ORDER — METFORMIN HYDROCHLORIDE 500 MG/1
1000 TABLET, EXTENDED RELEASE ORAL 2 TIMES DAILY
COMMUNITY
Start: 2020-11-14

## 2021-01-11 NOTE — PROGRESS NOTES
Office Cystoscopy Procedure Note    Indication:    Cystitis glandularis with intestinal metaplasia, surveillance    Informed consent   The risks, benefits, complications, treatment options, and expected outcomes were discussed with the patient  The patient concurred with the proposed plan and provided informed consent  Anesthesia  Lidocaine jelly 2%    Antibiotic prophylaxis   None    Procedure  The patient was placed in the supineposition, was prepped and draped in the usual manner using sterile technique, and 2% lidocaine jelly instilled into the urethra  A 17 F flexible cystoscope was then inserted into the urethra and the urethra and bladder carefully examined  The following findings were noted:    Findings:  Urethra:  Normal caliber, intact sphincter, no lesions, no strictures  Prostate:  Lateral lobe hypertrophy present, some angulation of the prostatic urethra within normal bladder neck  Bladder:  Normal mucosa, signs of previous resection present along the left floor of the bladder, no malignant findings, no concerning findings for recurrence of cystitis cystica and glandularis  Ureteral orifices:  Orthotopic, clear urine exiting  Other findings:  None, retroflexed view confirms    Specimens: None                 Complications:    None; patient tolerated the procedure well           Disposition: To home            Condition: Stable    Plan: Negative surveillance cystoscopy, follow-up in 6 months for surveillance cystoscopy       Cystoscopy     Date/Time 1/11/2021 9:15 AM     Performed by  Reyes Plasencia MD     Authorized by Reyes Plasencia MD      Universal Protocol:  Procedure performed by:  Consent: Verbal consent obtained  Written consent obtained    Risks and benefits: risks, benefits and alternatives were discussed  Consent given by: patient  Patient understanding: patient states understanding of the procedure being performed  Patient consent: the patient's understanding of the procedure matches consent given  Procedure consent: procedure consent matches procedure scheduled  Relevant documents: relevant documents present and verified  Test results: test results available and properly labeled  Site marked: the operative site was not marked  Radiology Images displayed and confirmed  If images not available, report reviewed: imaging studies available  Required items: required blood products, implants, devices, and special equipment available  Patient identity confirmed: verbally with patient        Procedure Details:  Procedure type: cystoscopy    Patient tolerance: Patient tolerated the procedure well with no immediate complications    Additional Procedure Details: Office Cystoscopy Procedure Note    Indication:    Cystitis glandularis with intestinal metaplasia, surveillance    Informed consent   The risks, benefits, complications, treatment options, and expected outcomes were discussed with the patient  The patient concurred with the proposed plan and provided informed consent  Anesthesia  Lidocaine jelly 2%    Antibiotic prophylaxis   None    Procedure  The patient was placed in the supineposition, was prepped and draped in the usual manner using sterile technique, and 2% lidocaine jelly instilled into the urethra  A 17 F flexible cystoscope was then inserted into the urethra and the urethra and bladder carefully examined    The following findings were noted:    Findings:  Urethra:  Normal caliber, intact sphincter, no lesions, no strictures  Prostate:  Lateral lobe hypertrophy present, some angulation of the prostatic urethra within normal bladder neck  Bladder:  Normal mucosa, signs of previous resection present along the left floor of the bladder, no malignant findings, no concerning findings for recurrence of cystitis cystica and glandularis  Ureteral orifices:  Orthotopic, clear urine exiting  Other findings:  None, retroflexed view confirms    Specimens: None                 Complications: None; patient tolerated the procedure well           Disposition: To home            Condition: Stable    Plan: Negative surveillance cystoscopy, follow-up in 6 months for surveillance cystoscopy

## 2021-01-11 NOTE — LETTER
January 11, 2021     Beba Yusuf MD  [address]  [address]    [facility]   YOB: 1960   Date of Visit: 1/11/2021       Dear Dr Alcala Lanagan: Thank you for referring Edwin Salazar to me for evaluation  Below are my notes for this consultation  If you have questions, please do not hesitate to call me  I look forward to following your patient along with you  Sincerely,        ARMANDO Bosch MD        CC: No Recipients  ARMANDO Bosch MD  1/11/2021  9:15 AM  Sign when Signing Visit  Office Cystoscopy Procedure Note    Indication:    Cystitis glandularis with intestinal metaplasia, surveillance    Informed consent   The risks, benefits, complications, treatment options, and expected outcomes were discussed with the patient  The patient concurred with the proposed plan and provided informed consent  Anesthesia  Lidocaine jelly 2%    Antibiotic prophylaxis   None    Procedure  The patient was placed in the supineposition, was prepped and draped in the usual manner using sterile technique, and 2% lidocaine jelly instilled into the urethra  A 17 F flexible cystoscope was then inserted into the urethra and the urethra and bladder carefully examined    The following findings were noted:    Findings:  Urethra:  Normal caliber, intact sphincter, no lesions, no strictures  Prostate:  Lateral lobe hypertrophy present, some angulation of the prostatic urethra within normal bladder neck  Bladder:  Normal mucosa, signs of previous resection present along the left floor of the bladder, no malignant findings, no concerning findings for recurrence of cystitis cystica and glandularis  Ureteral orifices:  Orthotopic, clear urine exiting  Other findings:  None, retroflexed view confirms    Specimens: None                 Complications:    None; patient tolerated the procedure well           Disposition: To home            Condition: Stable    Plan: Negative surveillance cystoscopy, follow-up in 6 months for surveillance cystoscopy       Cystoscopy     Date/Time 1/11/2021 9:15 AM     Performed by  Ayde Otto MD     Authorized by Ayde Otto MD      Universal Protocol:  Procedure performed by:  Consent: Verbal consent obtained  Written consent obtained  Risks and benefits: risks, benefits and alternatives were discussed  Consent given by: patient  Patient understanding: patient states understanding of the procedure being performed  Patient consent: the patient's understanding of the procedure matches consent given  Procedure consent: procedure consent matches procedure scheduled  Relevant documents: relevant documents present and verified  Test results: test results available and properly labeled  Site marked: the operative site was not marked  Radiology Images displayed and confirmed  If images not available, report reviewed: imaging studies available  Required items: required blood products, implants, devices, and special equipment available  Patient identity confirmed: verbally with patient        Procedure Details:  Procedure type: cystoscopy    Patient tolerance: Patient tolerated the procedure well with no immediate complications    Additional Procedure Details: Office Cystoscopy Procedure Note    Indication:    Cystitis glandularis with intestinal metaplasia, surveillance    Informed consent   The risks, benefits, complications, treatment options, and expected outcomes were discussed with the patient  The patient concurred with the proposed plan and provided informed consent  Anesthesia  Lidocaine jelly 2%    Antibiotic prophylaxis   None    Procedure  The patient was placed in the supineposition, was prepped and draped in the usual manner using sterile technique, and 2% lidocaine jelly instilled into the urethra  A 17 F flexible cystoscope was then inserted into the urethra and the urethra and bladder carefully examined  The following findings were noted:    Findings:  Urethra:  Normal caliber, intact sphincter, no lesions, no strictures  Prostate:  Lateral lobe hypertrophy present, some angulation of the prostatic urethra within normal bladder neck  Bladder:  Normal mucosa, signs of previous resection present along the left floor of the bladder, no malignant findings, no concerning findings for recurrence of cystitis cystica and glandularis  Ureteral orifices:  Orthotopic, clear urine exiting  Other findings:  None, retroflexed view confirms    Specimens: None                 Complications:    None; patient tolerated the procedure well           Disposition: To home            Condition: Stable    Plan: Negative surveillance cystoscopy, follow-up in 6 months for surveillance cystoscopy              Stacey Quevedo MD  1/11/2021  9:14 AM  Sign when Signing Visit       Problem List Items Addressed This Visit        Genitourinary    Cystitis glandularis - Primary    Relevant Orders    POCT urine dip (Completed)       Other    History of malignant carcinoid tumor of rectum    Relevant Orders    POCT urine dip (Completed)            Discussion:    Carlos Squires is doing well, ECOG performance status of 0, no voiding symptoms at this time, previous TURBT with was performed for concern of direct invasion of the bladder with carcinoid tumor showed only cystitis cystica and cystitis glandularis with some intestinal metaplasia  As this does have the potential for malignant transformation surveillance cystoscopy is recommended  Surveillance cystoscopy today is negative for new lesions, he will follow up in 6 months for surveillance cystoscopy purposes    Given the lack of definitive guidelines for the management of this condition, if a number of 6 month surveillance cystoscopies are negative, we can move to annual examinations, if those are negative for a period of time we can discontinue surveillance      Assessment and plan:     Please see problem oriented charting for the assessment plan of today's urological complaints    Rosalina Vanegas MD      Chief Complaint     Chief Complaint   Patient presents with    Cystitis glandularis    Cystoscopy         History of Present Illness     Cande Arguello is a 61 y o  gentleman with a history of rectal carcinoid tumor in 2011, he was seen to have abnormal findings on his bladder on a CT scan, cystoscopy confirmed this, he was taken for TURBT, this did not show recurrent tumor but did show some cystitis cystica and glandularis  Surveillance cystoscopy today shows no recurrence, clean mucosa, no concerning findings  New urologic complaints include none, voiding well  He was seen by medical oncology in November 2020, found to have no evidence of disease, with the plan for follow-up in 1 year with imaging prior  The following portions of the patient's history were reviewed and updated as appropriate: allergies, current medications, past family history, past medical history, past social history, past surgical history and problem list         Detailed Urologic History     - please refer to HPI    Review of Systems     Review of Systems   Constitutional: Negative  HENT: Negative  Eyes: Negative  Respiratory: Negative  Cardiovascular: Negative  Gastrointestinal: Negative  Endocrine: Negative  Genitourinary: Negative  Musculoskeletal: Negative  Skin: Negative  Allergic/Immunologic: Negative  Neurological: Negative  Hematological: Negative  Psychiatric/Behavioral: Negative  Allergies     No Known Allergies    Physical Exam     Physical Exam  Vitals signs reviewed  Constitutional:       General: He is not in acute distress  Appearance: Normal appearance  He is not ill-appearing, toxic-appearing or diaphoretic  HENT:      Head: Normocephalic and atraumatic  Eyes:      General: No scleral icterus  Right eye: No discharge           Left eye: No discharge  Cardiovascular:      Pulses: Normal pulses  Pulmonary:      Effort: Pulmonary effort is normal    Abdominal:      General: There is no distension  Genitourinary:     Penis: Normal     Musculoskeletal:         General: No swelling  Skin:     General: Skin is warm  Neurological:      General: No focal deficit present  Mental Status: He is alert and oriented to person, place, and time  Psychiatric:         Mood and Affect: Mood normal          Behavior: Behavior normal          Thought Content: Thought content normal          Judgment: Judgment normal              Vital Signs  Vitals:    01/11/21 0856   BP: 158/88   BP Location: Left arm   Patient Position: Sitting   Cuff Size: Standard   Pulse: 90   Weight: 95 3 kg (210 lb)   Height: 5' 6" (1 676 m)         Current Medications       Current Outpatient Medications:     Accu-Chek Danielle Plus test strip, TEST BLOOD SUGARS ONCE DAILY, Disp: , Rfl:     amLODIPine (NORVASC) 10 mg tablet, TAKE 1 TABLET EVERY DAY, Disp: 90 tablet, Rfl: 2    Ascorbic Acid (VITAMIN C) 500 MG CAPS, Take 1 tablet by mouth daily, Disp: , Rfl:     aspirin 81 MG tablet, Take 81 mg by mouth daily  , Disp: , Rfl:     atorvastatin (LIPITOR) 40 mg tablet, Take 40 mg by mouth daily at bedtime, Disp: , Rfl: 4    B-D UF III MINI PEN NEEDLES 31G X 5 MM MISC, USE 1 DAILY, Disp: , Rfl: 5    Basaglar KwikPen 100 units/mL injection pen, INJECT UP  UNITS DAILY AS DIRECTED, Disp: , Rfl:     BD Pen Needle Nelsy U/F 32G X 4 MM MISC, 2 (two) times a day Test, Disp: , Rfl:     Cholecalciferol (CVS VITAMIN D) 2000 units CAPS, Take 2 tablets by mouth 2 (two) times a day, Disp: , Rfl:     Cinnamon 500 MG TABS, Take by mouth, Disp: , Rfl:     coenzyme Q-10 100 MG capsule, Take 2 capsules by mouth daily, Disp: , Rfl:     glipiZIDE (GLUCOTROL XL) 10 mg 24 hr tablet, Take 10 mg by mouth daily, Disp: , Rfl:     JANUVIA 100 MG tablet, Take 100 mg by mouth daily, Disp: , Rfl: 2   metFORMIN (GLUCOPHAGE-XR) 500 mg 24 hr tablet, Take 1,000 mg by mouth 2 (two) times a day, Disp: , Rfl:     metoprolol succinate (TOPROL-XL) 100 mg 24 hr tablet, TAKE 1 TABLET BY MOUTH DAILY, Disp: 90 tablet, Rfl: 3    Multiple Vitamin (MULTI-VITAMIN DAILY) TABS, Take 1 tablet by mouth daily, Disp: , Rfl:     NovoLOG FlexPen 100 units/mL injection pen, INJECT 8 UNITS SUBCUTANEOUSLY BEFORE DINNER, Disp: , Rfl:     olmesartan (BENICAR) 40 mg tablet, TAKE 1 TABLET BY MOUTH EVERY DAY, Disp: 90 tablet, Rfl: 2    pyridostigmine (MESTINON) 60 mg tablet, Take 60 mg by mouth 3 (three) times a day , Disp: , Rfl:     triamterene-hydrochlorothiazide (MAXZIDE-25) 37 5-25 mg per tablet, Take 1 tablet by mouth daily, Disp: , Rfl:     VASCEPA 1 g CAPS, TAKE 1 CAPSULE BY MOUTH TWICE A DAY WITH MEALS, Disp: , Rfl: 2    docusate sodium (COLACE) 100 mg capsule, Take 1 capsule (100 mg total) by mouth 3 (three) times a day for 7 days (Patient not taking: Reported on 1/11/2021), Disp: 21 capsule, Rfl: 0    metFORMIN (FORTAMET) 1000 MG (OSM) 24 hr tablet, Take 1,000 mg by mouth 2 (two) times a day, Disp: , Rfl: 2      Active Problems     Patient Active Problem List   Diagnosis    Bladder wall thickening    Essential hypertension    Diabetes mellitus without complication (HCC)    Dyslipidemia    JERO (obstructive sleep apnea)    Myasthenia gravis (HCC)    History of malignant carcinoid tumor of rectum    Cystitis glandularis         Past Medical History     Past Medical History:   Diagnosis Date    Carcinoid tumor of colon     Colon polyp     CPAP (continuous positive airway pressure) dependence     Cyst of skin and subcutaneous tissue     back    Hyperlipidemia     Myasthenia gravis ([facility] [address] )     Sleep apnea          Surgical History     Past Surgical History:   Procedure Laterality Date    BACK SURGERY  01/01/2001    lump removed     COLONOSCOPY      1/1/12[facility]1/1/2016 [facility]      FL RETROGRADE PYELOGRAM  6/25/2020    NASAL POLYP EXCISION      TX COLONOSCOPY FLX DX W/COLLJ SPEC WHEN PFRMD N/A 7/19/2016    Procedure: COLONOSCOPY;  Surgeon: Maximino Leo MD;  [facility]; Service: Gastroenterology    TX CYSTOURETHROSCOPY,FULGUR <0 5 CM LESN N/A 6/25/2020    Procedure: TRANSURETHRAL RESECTION OF BLADDER TUMOR (TURBT);   Surgeon: Ayde Otto MD;  [facility];  Service: Urology    TX CYSTOURETHROSCOPY,URETER CATHETER Bilateral 6/25/2020    Procedure: CYSTOSCOPY WITH RETROGRADE PYELOGRAM;  Surgeon: Ayde Otto MD;  [facility];  Service: Urology         Family History     Family History   Problem Relation Age of Onset    Diabetes Mother     Heart disease Father     Heart failure Father     Prostate cancer Father          Social History     Social History     Social History     Tobacco Use   Smoking Status Never Smoker   Smokeless Tobacco Never Used         Pertinent Lab Values     Lab Results   Component Value Date    CREATININE 1 09 09/23/2020       Lab Results   Component Value Date    PSA 1 9 06/04/2020             Pertinent Imaging      No new imaging for my review

## 2021-01-21 ENCOUNTER — LAB (OUTPATIENT)
Dept: LAB | Facility: AMBULARY SURGERY CENTER | Age: 61
End: 2021-01-21
Payer: COMMERCIAL

## 2021-01-21 DIAGNOSIS — I10 ESSENTIAL HYPERTENSION, BENIGN: ICD-10-CM

## 2021-01-21 DIAGNOSIS — E87.0 CHRONIC HYPERNATREMIA: ICD-10-CM

## 2021-01-21 DIAGNOSIS — E11.65 UNCONTROLLED TYPE 2 DIABETES MELLITUS WITH HYPERGLYCEMIA (HCC): ICD-10-CM

## 2021-01-21 DIAGNOSIS — E03.9 PRIMARY HYPOTHYROIDISM: ICD-10-CM

## 2021-01-21 LAB
ANION GAP SERPL CALCULATED.3IONS-SCNC: 3 MMOL/L (ref 4–13)
BUN SERPL-MCNC: 15 MG/DL (ref 5–25)
CALCIUM SERPL-MCNC: 10 MG/DL (ref 8.3–10.1)
CHLORIDE SERPL-SCNC: 109 MMOL/L (ref 100–108)
CO2 SERPL-SCNC: 34 MMOL/L (ref 21–32)
CREAT SERPL-MCNC: 1.08 MG/DL (ref 0.6–1.3)
EST. AVERAGE GLUCOSE BLD GHB EST-MCNC: 137 MG/DL
GFR SERPL CREATININE-BSD FRML MDRD: 86 ML/MIN/1.73SQ M
GLUCOSE P FAST SERPL-MCNC: 112 MG/DL (ref 65–99)
HBA1C MFR BLD: 6.4 %
POTASSIUM SERPL-SCNC: 3.7 MMOL/L (ref 3.5–5.3)
SODIUM SERPL-SCNC: 146 MMOL/L (ref 136–145)
TSH SERPL DL<=0.05 MIU/L-ACNC: 1.11 UIU/ML (ref 0.36–3.74)

## 2021-01-21 PROCEDURE — 80048 BASIC METABOLIC PNL TOTAL CA: CPT

## 2021-01-21 PROCEDURE — 83036 HEMOGLOBIN GLYCOSYLATED A1C: CPT

## 2021-01-21 PROCEDURE — 3044F HG A1C LEVEL LT 7.0%: CPT | Performed by: UROLOGY

## 2021-01-21 PROCEDURE — 84443 ASSAY THYROID STIM HORMONE: CPT

## 2021-01-21 PROCEDURE — 82088 ASSAY OF ALDOSTERONE: CPT

## 2021-01-21 PROCEDURE — 84244 ASSAY OF RENIN: CPT

## 2021-01-21 PROCEDURE — 36415 COLL VENOUS BLD VENIPUNCTURE: CPT

## 2021-01-29 ENCOUNTER — OFFICE VISIT (OUTPATIENT)
Dept: CARDIOLOGY CLINIC | Facility: CLINIC | Age: 61
End: 2021-01-29
Payer: COMMERCIAL

## 2021-01-29 VITALS
HEART RATE: 85 BPM | HEIGHT: 66 IN | OXYGEN SATURATION: 97 % | BODY MASS INDEX: 34.07 KG/M2 | WEIGHT: 212 LBS | SYSTOLIC BLOOD PRESSURE: 180 MMHG | DIASTOLIC BLOOD PRESSURE: 94 MMHG

## 2021-01-29 DIAGNOSIS — I11.9 HYPERTENSIVE HEART DISEASE WITHOUT HEART FAILURE: ICD-10-CM

## 2021-01-29 DIAGNOSIS — E78.5 DYSLIPIDEMIA: ICD-10-CM

## 2021-01-29 DIAGNOSIS — G47.33 OSA (OBSTRUCTIVE SLEEP APNEA): ICD-10-CM

## 2021-01-29 DIAGNOSIS — I10 ESSENTIAL HYPERTENSION: Primary | ICD-10-CM

## 2021-01-29 LAB — ALDOST SERPL-MCNC: 11.1 NG/DL (ref 0–30)

## 2021-01-29 PROCEDURE — 3008F BODY MASS INDEX DOCD: CPT | Performed by: UROLOGY

## 2021-01-29 PROCEDURE — 93000 ELECTROCARDIOGRAM COMPLETE: CPT | Performed by: INTERNAL MEDICINE

## 2021-01-29 PROCEDURE — 99204 OFFICE O/P NEW MOD 45 MIN: CPT | Performed by: INTERNAL MEDICINE

## 2021-01-29 NOTE — PROGRESS NOTES
Cardiology Consultation     Homer Arias  [id number]  1960  [address] [facility] [place]  [place] [address]    1  Essential hypertension  POCT ECG    Echo complete with contrast if indicated   2  JERO (obstructive sleep apnea)     3  Dyslipidemia     4  Hypertensive heart disease without heart failure  Echo complete with contrast if indicated       Discussion/Summary:    1  Hypertension - Willem's blood pressure is elevated today but he checks this regularly at home and they run more normal   I reviewed his blood pressure readings that he records meticulously  I have asked him to bring his blood pressure cuff in for his next appointment with any physician to correlate its accuracy  We are going to follow this closely  He is on a good overall regimen and no changes were made to this today  We also went over lifestyle modifications that included weight loss and a low-sodium diet  We will have him back for follow-up in 3 months  2   Hypertensive heart disease - Echocardiograms in the past have demonstrated up to moderate left ventricular hypertrophy  We ordered an echocardiogram today  3   Hyperlipidemia - His LDL is at goal   He will remain on the same dose of atorvastatin  He will continue to have blood work followed closely  4   Obstructive sleep apnea - He is compliant on CPAP     HPI:    Mr Shima Ames comes in for a new patient to establish care given his risk factors for cardiovascular disease and hypertensive heart disease  He followed with Cardiology out of [place], but with his cardiologist retiring, he is now establishing care with us  He denies any cardiac events or any procedures performed from a cardiac standpoint    From what I gather he has a history of left ventricular hypertrophy and hypertensive heart disease, and has been followed closely by Cardiology for treatment of his risk factors  He does have obstructive sleep apnea and is compliant on CPAP  He also has diabetes mellitus and hyperlipidemia  His LDL has been at goal on atorvastatin  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 and follows with oncology  Willem's blood pressure is running high today, but he does check this meticulously at home  It runs more normal   He tells me he got a new blood pressure cuff in December  He has yet to correlate its accuracy  Reviewing his numbers, his average systolic blood pressures appear around 130 at home  He denies any symptoms from a cardiac standpoint  No chest pain or any symptoms of angina  He denies shortness of breath or any exercise intolerance  He tells me he exercises from a cardiovascular standpoint about 2-4 times per week and can do this without symptoms or limitations  He denies any signs or symptoms of CHF  He denies palpitations, lightheadedness or any history of syncope      Patient Active Problem List   Diagnosis    Bladder wall thickening    Essential hypertension    Diabetes mellitus without complication (HCC)    Dyslipidemia    JERO (obstructive sleep apnea)    Myasthenia gravis (HCC)    History of malignant carcinoid tumor of rectum    Cystitis glandularis    Hypertensive heart disease without heart failure     Past Medical History:   Diagnosis Date    Carcinoid tumor of colon     Colon polyp     CPAP (continuous positive airway pressure) dependence     Cyst of skin and subcutaneous tissue     back    Hyperlipidemia     Myasthenia gravis ([address] )     Sleep apnea      Social History     Socioeconomic History    Marital status: /Civil Union     Spouse name: Not on file    Number of children: Not on file    Years of education: Not on file    Highest education level: Not on file   Occupational History    Not on file   Social Needs    Financial resource strain: Not on file    Food insecurity     Worry: Not on file     Inability: Not on file    Transportation needs     Medical: Not on file     Non-medical: Not on file   Tobacco Use    Smoking status: Never Smoker    Smokeless tobacco: Never Used   Substance and Sexual Activity    Alcohol use: Yes     Alcohol/week: 1 0 standard drinks     Types: 1 Glasses of wine per week     Frequency: 2-4 times a month     Comment: 1 per week    Drug use: Never    Sexual activity: Not on file   Lifestyle    Physical activity     Days per week: Not on file     Minutes per session: Not on file    Stress: Not on file   Relationships    Social connections     Talks on phone: Not on file     Gets together: Not on file     Attends [employer] service: Not on file     Active member of club or organization: Not on file     Attends meetings of clubs or organizations: Not on file     Relationship status: Not on file    Intimate partner violence     Fear of current or ex partner: Not on file     Emotionally abused: Not on file     Physically abused: Not on file     Forced sexual activity: Not on file   Other Topics Concern    Not on file   Social History Narrative    Most recent tobacco use screenin2019           Do you currently or have you served in the [employer]:   No          Were you activated, into active duty, as a member of the [employer] or as a Reservist:   No           Advance directive:   No          Chewing tobacco:   none          Alcohol intake: Moderate          1 a week          Marital status:             Live alone or with others:   with others           Exercise level:   Occasional          General stress level:   Medium          Diet:   Regular          Caffeine intake:    Moderate           Guns present in home:   Yes           Seat belts used routinely:   Yes           Sunscreen used routinely:   Yes           Illicit drugs:   no          Smoke alarm in home:   Yes          Are there stairs in your home:   Yes           Pets:   Yes      4-cats     Per linda Family History   Problem Relation Age of Onset    Diabetes Mother     Heart disease Father     Heart failure Father     Prostate cancer Father      Past Surgical History:   Procedure Laterality Date    BACK SURGERY  01/01/2001    lump removed     COLONOSCOPY      1/1/12,1/1/2016 tumor removed     DENTAL SURGERY      FL RETROGRADE PYELOGRAM  6/25/2020    NASAL POLYP EXCISION      DE COLONOSCOPY FLX DX W/COLLJ SPEC WHEN PFRMD N/A 7/19/2016    Procedure: COLONOSCOPY;  Surgeon: Elisa Sanchez MD;  [facility]; Service: Gastroenterology    DE CYSTOURETHROSCOPY,FULGUR <0 5 CM LESN N/A 6/25/2020    Procedure: TRANSURETHRAL RESECTION OF BLADDER TUMOR (TURBT); Surgeon: Corrinne Scriver, MD;  [facility];  Service: Urology    DE CYSTOURETHROSCOPY,URETER CATHETER Bilateral 6/25/2020    Procedure: CYSTOSCOPY WITH RETROGRADE PYELOGRAM;  Surgeon: Corrinne Scriver, MD;  [facility];  Service: Urology       Current Outpatient Medications:     Accu-Chek Danielle Plus test strip, TEST BLOOD SUGARS ONCE DAILY, Disp: , Rfl:     amLODIPine (NORVASC) 10 mg tablet, TAKE 1 TABLET EVERY DAY, Disp: 90 tablet, Rfl: 2    Ascorbic Acid (VITAMIN C) 500 MG CAPS, Take 1 tablet by mouth daily, Disp: , Rfl:     aspirin 81 MG tablet, Take 81 mg by mouth daily  , Disp: , Rfl:     atorvastatin (LIPITOR) 40 mg tablet, Take 40 mg by mouth daily at bedtime, Disp: , Rfl: 4    B-D UF III MINI PEN NEEDLES 31G X 5 MM MISC, USE 1 DAILY, Disp: , Rfl: 5    Basaglar KwikPen 100 units/mL injection pen, INJECT UP  UNITS DAILY AS DIRECTED, Disp: , Rfl:     BD Pen Needle Nelsy U/F 32G X 4 MM MISC, 2 (two) times a day Test, Disp: , Rfl:     Cholecalciferol ([employer]S VITAMIN D) 2000 units CAPS, Take 2 tablets by mouth 2 (two) times a day, Disp: , Rfl:     Cinnamon 500 MG TABS, Take by mouth, Disp: , Rfl:     coenzyme Q-10 100 MG capsule, Take 2 capsules by mouth daily, Disp: , Rfl:     glipiZIDE (GLUCOTROL XL) 10 mg 24 hr tablet, Take 10 mg by mouth daily, Disp: , Rfl:     JANUVIA 100 MG tablet, Take 100 mg by mouth daily, Disp: , Rfl: 2    metFORMIN (FORTAMET) 1000 MG (OSM) 24 hr tablet, Take 1,000 mg by mouth 2 (two) times a day, Disp: , Rfl: 2    metoprolol succinate (TOPROL-XL) 100 mg 24 hr tablet, TAKE 1 TABLET BY MOUTH DAILY, Disp: 90 tablet, Rfl: 3    Multiple Vitamin (MULTI-VITAMIN DAILY) TABS, Take 1 tablet by mouth daily, Disp: , Rfl:     NovoLOG FlexPen 100 units/mL injection pen, INJECT 8 UNITS SUBCUTANEOUSLY BEFORE DINNER, Disp: , Rfl:     olmesartan (BENICAR) 40 mg tablet, TAKE 1 TABLET BY MOUTH EVERY DAY, Disp: 90 tablet, Rfl: 2    pyridostigmine (MESTINON) 60 mg tablet, Take 60 mg by mouth 3 (three) times a day , Disp: , Rfl:     triamterene-hydrochlorothiazide (MAXZIDE-25) 37 5-25 mg per tablet, Take 1 tablet by mouth daily, Disp: , Rfl:     VASCEPA 1 g CAPS, TAKE 1 CAPSULE BY MOUTH TWICE A DAY WITH MEALS, Disp: , Rfl: 2    docusate sodium (COLACE) 100 mg capsule, Take 1 capsule (100 mg total) by mouth 3 (three) times a day for 7 days (Patient not taking: Reported on 1/11/2021), Disp: 21 capsule, Rfl: 0    metFORMIN (GLUCOPHAGE-XR) 500 mg 24 hr tablet, Take 1,000 mg by mouth 2 (two) times a day, Disp: , Rfl:   No Known Allergies  Vitals:    01/29/21 1549   BP: (!) 180/94   BP Location: Left arm   Patient Position: Sitting   Cuff Size: Standard   Pulse: 85   SpO2: 97%   Weight: 96 2 kg (212 lb)   Height: 5' 6" (1 676 m)       Labs:  Lab Results   Component Value Date    K 3 7 01/21/2021    K 3 7 02/24/2020     (H) 01/21/2021     02/24/2020    CO2 34 (H) 01/21/2021    CO2 24 02/24/2020    BUN 15 01/21/2021    BUN 13 02/24/2020    CREATININE 1 08 01/21/2021    CALCIUM 10 0 01/21/2021       Lab Results   Component Value Date    WBC 5 33 09/23/2020    HGB 14 1 09/23/2020    HCT 44 5 09/23/2020    MCV 87 09/23/2020     09/23/2020     Imaging ECG today shows sinus rhythm with nonspecific T-wave changes  Review of Systems:  Review of Systems   Constitutional: Negative  HENT: Negative  Eyes: Negative  Respiratory: Negative  Cardiovascular: Negative  Gastrointestinal: Negative  Musculoskeletal: Positive for myalgias  Skin: Negative  Allergic/Immunologic: Negative  Neurological: Negative  Hematological: Negative  Psychiatric/Behavioral: Negative  All other systems reviewed and are negative  Vitals:    01/29/21 1549   BP: (!) 180/94   BP Location: Left arm   Patient Position: Sitting   Cuff Size: Standard   Pulse: 85   SpO2: 97%   Weight: 96 2 kg (212 lb)   Height: 5' 6" (1 676 m)     Physical Exam:  Physical Exam  Constitutional:       Appearance: He is well-developed  HENT:      Head: Normocephalic and atraumatic  Eyes:      General: No scleral icterus  Right eye: No discharge  Left eye: No discharge  Pupils: Pupils are equal, round, and reactive to light  Neck:      Musculoskeletal: Normal range of motion and neck supple  Thyroid: No thyromegaly  Vascular: No JVD  Cardiovascular:      Rate and Rhythm: Normal rate and regular rhythm  No extrasystoles are present  Pulses: Normal pulses  No decreased pulses  Heart sounds: Normal heart sounds, S1 normal and S2 normal  No murmur  No friction rub  No gallop  Pulmonary:      Effort: Pulmonary effort is normal  No respiratory distress  Breath sounds: Normal breath sounds  No wheezing or rales  Abdominal:      General: Bowel sounds are normal  There is no distension  Palpations: Abdomen is soft  Tenderness: There is no abdominal tenderness  Musculoskeletal: Normal range of motion  General: No tenderness or deformity  Skin:     General: Skin is warm and dry  Findings: No rash  Neurological:      Mental Status: He is alert and oriented to person, place, and time  Cranial Nerves: No cranial nerve deficit     Psychiatric: Thought Content: Thought content normal          Judgment: Judgment normal      Counseling / Coordination of Care  Total office time spent today 40 minutes  Greater than 50% of total time was spent with the patient and / or family counseling and / or coordination of care

## 2021-01-29 NOTE — PATIENT INSTRUCTIONS
Low-Sodium Diet   AMBULATORY CARE:   A low-sodium diet  limits foods that are high in sodium (salt)  You will need to follow a low-sodium diet if you have high blood pressure, kidney disease, or heart failure  You may also need to follow this diet if you have a condition that is causing your body to retain (hold) extra fluid  You may need to limit the amount of sodium you eat in a day to 1,500 to 2,000 mg  Ask your healthcare provider how much sodium you can have each day  How to use food labels to choose foods that are low in sodium:  Read food labels to find the amount of sodium they contain  The amount of sodium is listed in milligrams (mg)  The % Daily Value (DV) column tells you how much of your daily needs are met by 1 serving of the food for each nutrient listed  Choose foods that have less than 5% of the DV of sodium  These foods are considered low in sodium  Foods that have 20% or more of the DV of sodium are considered high in sodium  Some food labels may also list any of the following terms that tell you about the sodium content in the food:  · Sodium-free:  Less than 5 mg in each serving    · Very low sodium:  35 mg of sodium or less in each serving    · Low sodium:  140 mg of sodium or less in each serving    · Reduced sodium: At least 25% less sodium in each serving than the regular type    · Light in sodium:  50% less sodium in each serving    · Unsalted or no added salt:  No extra salt is added during processing (the food may still contain sodium)     Foods to avoid:  Salty foods are high in sodium  You should avoid the following:  · Processed foods:      ? Mixes for cornbread, biscuits, cake, and pudding     ? Instant foods, such as potatoes, cereals, noodles, and rice     ? Packaged foods, such as bread stuffing, rice and pasta mixes, snack dip mixes, and macaroni and cheese     ? Canned foods, such as canned vegetables, soups, broths, sauces, and vegetable or tomato juice    ?  Snack foods, such as salted chips, popcorn, pretzels, pork rinds, salted crackers, and salted nuts    ? Frozen foods, such as dinners, entrees, vegetables with sauces, and breaded meats    ? Sauerkraut, pickled vegetables, and other foods prepared in brine    · Meats and cheeses:      ? Smoked or cured meat, such as corned beef, omalley, ham, hot dogs, and sausage    ? Canned meats or spreads, such as potted meats, sardines, anchovies, and imitation seafood    ? Deli or lunch meats, such as bologna, ham, turkey, and roast beef    ? Processed cheese, such as American cheese and cheese spreads    · Condiments, sauces, and seasonings:      ? Salt (¼ teaspoon of salt contains 575 mg of sodium)    ? Seasonings made with salt, such as garlic salt, celery salt, onion salt, and seasoned salt    ? Regular soy sauce, barbecue sauce, teriyaki sauce, steak sauce, Worcestershire sauce, and most flavored vinegars    ? Canned gravy and mixes     ? Regular condiments, such as mustard, ketchup, and salad dressings    ? Pickles and olives    ? Meat tenderizers and monosodium glutamate (MSG)    Foods to include:  Read the food label to find the exact amount of sodium in each serving  · Bread and cereal:  Try to choose breads with less than 80 mg of sodium per serving  ? Bread, roll, sandy, tortilla, or unsalted crackers  ? Ready-to-eat cereals with less than 5% DV of sodium (examples include shredded wheat and puffed rice)    ? Pasta    · Vegetables and fruits:      ? Unsalted fresh, frozen, or canned vegetables    ? Fresh, frozen, or canned fruits    ? Fruit juice    · Dairy:  One serving has about 150 mg of sodium  ? Milk, all types    ? Yogurt    ? Hard cheese, such as cheddar, Swiss, [employer], or mozzarella    · Meat and other protein foods:  Some raw meats may have added sodium  ? Plain meats, fish, and poultry     ? Eggs    · Other foods:      ? Homemade pudding    ? Unsalted nuts, popcorn, or pretzels    ?  Unsalted butter or margarine    Ways to decrease sodium:   · Add spices and herbs to foods instead of salt during cooking  Use salt-free seasonings to add flavor to foods  Examples include onion powder, garlic powder, basil, hickey powder, paprika, and parsley  Try lemon or lime juice or vinegar to give foods a tart flavor  Use hot peppers, pepper, or cayenne pepper to add a spicy flavor  · Do not keep a salt shaker at your kitchen table  This may help keep you from adding salt to food at the table  A teaspoon of salt has 2,300 mg of sodium  It may take time to get used to enjoying the natural flavor of food instead of adding salt  Talk to your healthcare provider before you use salt substitutes  Some salt substitutes have a high amount of potassium and need to be avoided if you have kidney disease  · Choose low-sodium foods at restaurants  Meals from restaurants are often high in sodium  Some restaurants have nutrition information on the menu that tells you the amount of sodium in their foods  If possible, ask for your food to be prepared with less, or no salt  · Shop for unsalted or low-sodium foods and snacks at the grocery store  Examples include unsalted or low-sodium broths, soups, and canned vegetables  Choose fresh or frozen vegetables instead  Choose unsalted nuts or seeds or fresh fruits or vegetables as snacks  Read food labels and choose salt-free, very low-sodium, or low-sodium foods  © Copyright [address] Information is for End User's use only and may not be sold, redistributed or otherwise used for commercial purposes  All illustrations and images included in CareNotes® are the copyrighted property of [employer]  or [facility] Anushka Burnett   The above information is an  only  It is not intended as medical advice for individual conditions or treatments  Talk to your doctor, nurse or pharmacist before following any medical regimen to see if it is safe and effective for you

## 2021-01-30 LAB — RENIN PLAS-CCNC: 0.94 NG/ML/HR (ref 0.17–5.38)

## 2021-02-22 ENCOUNTER — OFFICE VISIT (OUTPATIENT)
Dept: FAMILY MEDICINE CLINIC | Facility: CLINIC | Age: 61
End: 2021-02-22
Payer: COMMERCIAL

## 2021-02-22 VITALS
HEART RATE: 86 BPM | DIASTOLIC BLOOD PRESSURE: 68 MMHG | BODY MASS INDEX: 34.07 KG/M2 | OXYGEN SATURATION: 98 % | TEMPERATURE: 97 F | WEIGHT: 212 LBS | HEIGHT: 66 IN | SYSTOLIC BLOOD PRESSURE: 132 MMHG

## 2021-02-22 DIAGNOSIS — E78.5 DYSLIPIDEMIA: ICD-10-CM

## 2021-02-22 DIAGNOSIS — M79.605 PAIN IN BOTH LOWER EXTREMITIES: ICD-10-CM

## 2021-02-22 DIAGNOSIS — Z12.5 PROSTATE CANCER SCREENING: ICD-10-CM

## 2021-02-22 DIAGNOSIS — I10 ESSENTIAL HYPERTENSION: ICD-10-CM

## 2021-02-22 DIAGNOSIS — G47.33 OSA (OBSTRUCTIVE SLEEP APNEA): ICD-10-CM

## 2021-02-22 DIAGNOSIS — G70.00 MYASTHENIA GRAVIS (HCC): ICD-10-CM

## 2021-02-22 DIAGNOSIS — E11.9 DIABETES MELLITUS WITHOUT COMPLICATION (HCC): Primary | ICD-10-CM

## 2021-02-22 DIAGNOSIS — M79.604 PAIN IN BOTH LOWER EXTREMITIES: ICD-10-CM

## 2021-02-22 PROCEDURE — 99214 OFFICE O/P EST MOD 30 MIN: CPT | Performed by: FAMILY MEDICINE

## 2021-02-22 PROCEDURE — 3075F SYST BP GE 130 - 139MM HG: CPT | Performed by: FAMILY MEDICINE

## 2021-02-22 PROCEDURE — 3725F SCREEN DEPRESSION PERFORMED: CPT | Performed by: FAMILY MEDICINE

## 2021-02-22 PROCEDURE — 3078F DIAST BP <80 MM HG: CPT | Performed by: FAMILY MEDICINE

## 2021-02-22 PROCEDURE — 1036F TOBACCO NON-USER: CPT | Performed by: FAMILY MEDICINE

## 2021-02-22 PROCEDURE — 3008F BODY MASS INDEX DOCD: CPT | Performed by: FAMILY MEDICINE

## 2021-02-22 NOTE — ASSESSMENT & PLAN NOTE
Lab Results   Component Value Date    HGBA1C 6 4 (H) 01/21/2021   sees endo has been well controlled

## 2021-02-22 NOTE — PROGRESS NOTES
Assessment/Plan:    Subjective     Pt here for interval visit  HTN HL DM JERO GERD hx of carcinoid tumor of rectum and Myasthenia Gravis sees neuro and endocrinologist pt states he has pin in  Both lower extremities no numbness occurs with certain positions that stress achilles or at night after standing for a long time pt does wear  Flat shoes  Able to run without pain in legs       Problem List Items Addressed This Visit        Endocrine    Diabetes mellitus without complication ([address] ) - Primary       Lab Results   Component Value Date    HGBA1C 6 4 (H) 01/21/2021   sees endo has been well controlled            Respiratory    JERO (obstructive sleep apnea)     On cpap            Cardiovascular and Mediastinum    Essential hypertension     Ok on meds            Nervous and Auditory    Myasthenia gravis ([address] )     Monitored by neuro            Other    Dyslipidemia     Ok on meds         Pain in both lower extremities     Tender along both achilles tendons  To palpation FROM  Pulses nl         Relevant Orders    Ambulatory referral to Physical Therapy      Other Visit Diagnoses     Prostate cancer screening        Relevant Orders    PSA, Total Screen           Patient ID: María Cruz is a 61 y o  male  HPI    The following portions of the patient's history were reviewed and updated as appropriate:   Past Medical History:  He has a past medical history of Carcinoid tumor of colon, Colon polyp, CPAP (continuous positive airway pressure) dependence, Cyst of skin and subcutaneous tissue, Hyperlipidemia, Myasthenia gravis ([facility] [address] ), and Sleep apnea ,  _______________________________________________________________________  Medical Problems:  does not have any pertinent problems on file ,  _______________________________________________________________________  Past Surgical History:   has a past surgical history that includes Dental surgery;  Nasal polyp excision; pr colonoscopy flx dx w/collj spec when pfrmd (N/A, 7/19/2016); Back surgery (01/01/2001); Colonoscopy; [place] retrograde pyelogram (6/25/2020); pr cystourethroscopy,fulgur <0 5 cm lesn (N/A, 6/25/2020); and pr cystourethroscopy,ureter catheter (Bilateral, 6/25/2020)  ,  _______________________________________________________________________  Family History:  family history includes Diabetes in his mother; Heart disease in his father; Heart failure in his father; Prostate cancer in his father ,  _______________________________________________________________________  Social History:   reports that he has never smoked  He has never used smokeless tobacco  He reports current alcohol use of about 1 0 standard drinks of alcohol per week  He reports that he does not use drugs  ,  _______________________________________________________________________  Allergies:  has No Known Allergies     _______________________________________________________________________  Current Outpatient Medications   Medication Sig Dispense Refill    Accu-Chek Danielle Plus test strip TEST BLOOD SUGARS ONCE DAILY      amLODIPine (NORVASC) 10 mg tablet TAKE 1 TABLET EVERY DAY 90 tablet 2    Ascorbic Acid (VITAMIN C) 500 MG CAPS Take 1 tablet by mouth daily      aspirin 81 MG tablet Take 81 mg by mouth daily        atorvastatin (LIPITOR) 40 mg tablet Take 40 mg by mouth daily at bedtime  4    B-D UF III MINI PEN NEEDLES 31G X 5 MM MISC USE 1 DAILY  5    Basaglar KwikPen 100 units/mL injection pen INJECT UP  UNITS DAILY AS DIRECTED      BD Pen Needle Nelsy U/F 32G X 4 MM MISC 2 (two) times a day Test      Cholecalciferol ([employer] VITAMIN D) 2000 units CAPS Take 2 tablets by mouth 2 (two) times a day      Cinnamon 500 MG TABS Take by mouth      coenzyme Q-10 100 MG capsule Take 2 capsules by mouth daily      glipiZIDE (GLUCOTROL XL) 10 mg 24 hr tablet Take 10 mg by mouth daily      JANUVIA 100 MG tablet Take 100 mg by mouth daily  2    metFORMIN (GLUCOPHAGE-XR) 500 mg 24 hr tablet Take 1,000 mg by mouth 2 (two) times a day      metoprolol succinate (TOPROL-XL) 100 mg 24 hr tablet TAKE 1 TABLET BY MOUTH DAILY 90 tablet 3    Multiple Vitamin (MULTI-VITAMIN DAILY) TABS Take 1 tablet by mouth daily      NovoLOG FlexPen 100 units/mL injection pen INJECT 8 UNITS SUBCUTANEOUSLY BEFORE DINNER      olmesartan (BENICAR) 40 mg tablet TAKE 1 TABLET BY MOUTH EVERY DAY 90 tablet 2    pyridostigmine (MESTINON) 60 mg tablet Take 60 mg by mouth 3 (three) times a day   triamterene-hydrochlorothiazide (MAXZIDE-25) 37 5-25 mg per tablet Take 1 tablet by mouth daily      VASCEPA 1 g CAPS TAKE 1 CAPSULE BY MOUTH TWICE A DAY WITH MEALS  2     No current facility-administered medications for this visit       _______________________________________________________________________  Review of Systems   Constitutional: Negative for appetite change, chills, fatigue and fever  Respiratory: Negative for cough, chest tightness and shortness of breath  Cardiovascular: Negative for chest pain, palpitations and leg swelling  Gastrointestinal: Negative for abdominal pain, constipation, diarrhea, nausea and vomiting  Genitourinary: Negative for difficulty urinating and frequency  Musculoskeletal: Negative for arthralgias, back pain and neck pain  Pain on off lower legs ankle to calfs with certain motions only   Skin: Negative for rash  Neurological: Negative for dizziness, weakness, light-headedness, numbness and headaches  Hematological: Does not bruise/bleed easily  Psychiatric/Behavioral: Negative for dysphoric mood and sleep disturbance  The patient is not nervous/anxious  Objective:  Vitals:    02/22/21 1009 02/22/21 1018   BP: 140/78 132/68   Pulse: 86    Temp: (!) 97 °F (36 1 °C)    SpO2: 98%    Weight: 96 2 kg (212 lb)    Height: 5' 6" (1 676 m)      Body mass index is 34 22 kg/m²  Physical Exam  Vitals signs reviewed  Constitutional:       General: He is not in acute distress  Appearance: Normal appearance  He is well-developed  He is obese  He is not ill-appearing  HENT:      Mouth/Throat:      Mouth: Mucous membranes are moist    Eyes:      Extraocular Movements: Extraocular movements intact  Conjunctiva/sclera: Conjunctivae normal       Pupils: Pupils are equal, round, and reactive to light  Neck:      Musculoskeletal: Normal range of motion and neck supple  Thyroid: No thyromegaly  Vascular: No carotid bruit  Cardiovascular:      Rate and Rhythm: Normal rate and regular rhythm  Pulses: Normal pulses  Heart sounds: Normal heart sounds  No murmur  Pulmonary:      Effort: Pulmonary effort is normal       Breath sounds: Normal breath sounds  Chest:      Chest wall: No tenderness  Abdominal:      General: Bowel sounds are normal  There is no distension  Palpations: Abdomen is soft  Tenderness: There is no abdominal tenderness  Musculoskeletal:         General: Tenderness (along both achilles tendons from ankles to calves) present  Lymphadenopathy:      Cervical: No cervical adenopathy  Skin:     General: Skin is warm and dry  Neurological:      General: No focal deficit present  Mental Status: He is alert and oriented to person, place, and time  Mental status is at baseline  Cranial Nerves: No cranial nerve deficit     Psychiatric:         Mood and Affect: Mood normal          Behavior: Behavior normal

## 2021-03-08 ENCOUNTER — LAB (OUTPATIENT)
Dept: LAB | Facility: AMBULARY SURGERY CENTER | Age: 61
End: 2021-03-08
Payer: COMMERCIAL

## 2021-03-08 DIAGNOSIS — Z12.5 PROSTATE CANCER SCREENING: ICD-10-CM

## 2021-03-08 LAB — PSA SERPL-MCNC: 2.3 NG/ML (ref 0–4)

## 2021-03-08 PROCEDURE — G0103 PSA SCREENING: HCPCS

## 2021-03-08 PROCEDURE — 36415 COLL VENOUS BLD VENIPUNCTURE: CPT

## 2021-03-10 DIAGNOSIS — Z23 ENCOUNTER FOR IMMUNIZATION: ICD-10-CM

## 2021-03-16 ENCOUNTER — EVALUATION (OUTPATIENT)
Dept: PHYSICAL THERAPY | Facility: CLINIC | Age: 61
End: 2021-03-16
Payer: COMMERCIAL

## 2021-03-16 DIAGNOSIS — M79.605 PAIN IN BOTH LOWER EXTREMITIES: Primary | ICD-10-CM

## 2021-03-16 DIAGNOSIS — M79.604 PAIN IN BOTH LOWER EXTREMITIES: Primary | ICD-10-CM

## 2021-03-16 PROCEDURE — 97110 THERAPEUTIC EXERCISES: CPT | Performed by: PHYSICAL THERAPIST

## 2021-03-16 PROCEDURE — 97161 PT EVAL LOW COMPLEX 20 MIN: CPT | Performed by: PHYSICAL THERAPIST

## 2021-03-16 NOTE — PROGRESS NOTES
PT Evaluation     Today's date: 3/16/2021  Patient name: Kylie Bell  : 1960  MRN: [id number]  Referring provider: Cecelia Mondragon MD  Dx:   Encounter Diagnosis     ICD-10-CM    1  Pain in both lower extremities  M79 604 Ambulatory referral to Physical Therapy    M79 605                   Assessment  Assessment details: Patient presents with signs and symptoms consistent with lumbar spinal stenosis  Positive prognostic indicators include: Motivated to improve  Negative prognostic indicators include: (-)  He presents with: pain, decreased: ROM, strength and  functional capacity  He requires skilled PT to address these deficits and restore maximal functional capacity emphasizing flexion based treatment program   Thank you for this pleasant referral        Impairments: abnormal or restricted ROM, activity intolerance, impaired physical strength, lacks appropriate home exercise program and pain with function  Understanding of Dx/Px/POC: good   Prognosis: good    Goals  ST-6 weeks  1  Patient to be independent with HEP  2   Decrease pain at least 2 subjective levels  LT-12 weeks  1    Patient to voice comfort with self management of condition  2   75% or > decreased pain  3   75% or > decreased functional deficits  4   Normalize AROM of all deficit planes  7   Patient to voice understanding of activities/positions to avoid  8   Centralize symptoms        Plan  Patient would benefit from: skilled PT  Referral necessary: No  Planned modality interventions: cryotherapy  Planned therapy interventions: IADL retraining, joint mobilization, manual therapy, motor coordination training, neuromuscular re-education, patient education, postural training, self care, strengthening, stretching, therapeutic activities, therapeutic exercise, home exercise program, flexibility, ADL training, balance and body mechanics training  Frequency: 2x week  Duration in weeks: 6  Treatment plan discussed with: patient        Subjective Evaluation    History of Present Illness  Onset date: Months and months ago  Mechanism of injury: Chief Complaint: B calf pain    History:  Patient notes insidious onset of B LE calf pain  Symptoms have been mostly unchanged recently  He denies diagnostic testing for this  He works as a telcom  with prolonged sitting/computer work duties  PMH:  Hx L4 HNP, HTN, DM II      Aggravating factors: Prolonged standing, Lying in recliner, Treadmill (1st 10-15 min especially)   Lying to sleep (legs extended)    Relieving factors: Elevating legs    Functional Deficits: cooking, standing in line, getting to sleep    Patient Goals: Resolve problem or improve problem    Quality of life: good    Pain  At best pain ratin  At worst pain ratin  Location: B LE distal calves R > L          Objective     Active Range of Motion     Lumbar   Flexion:  WFL  Extension:  with pain Restriction level: minimal    Joint Play     Hypomobile: L4, L5 and S1   Mechanical Assessment    Cervical      Thoracic      Lumbar    Standing extension: repeated movements  Pain level: produced  Lying extension: repeated movements  Pain level: produced    General Comments:      Lumbar Comments  (-) Knee/Ankle/Calf Screen  (+) Quadrant closing B  (-) Remainder special testing             Precautions: (-)      Manuals                                                                 Neuro Re-Ed             Abd Kathleen             Dead bugs             Tball LAQ             Tball March                                                    Ther Ex             HEP 10            FISItting             Pball Rollouts Flex             DKC             SKC             PPT             HS Str B             Q Str B             Ther Activity                                       Gait Training                                       Modalities

## 2021-03-23 ENCOUNTER — OFFICE VISIT (OUTPATIENT)
Dept: PHYSICAL THERAPY | Facility: CLINIC | Age: 61
End: 2021-03-23
Payer: COMMERCIAL

## 2021-03-23 DIAGNOSIS — M79.604 PAIN IN BOTH LOWER EXTREMITIES: Primary | ICD-10-CM

## 2021-03-23 DIAGNOSIS — M79.605 PAIN IN BOTH LOWER EXTREMITIES: Primary | ICD-10-CM

## 2021-03-23 PROCEDURE — 97110 THERAPEUTIC EXERCISES: CPT

## 2021-03-23 PROCEDURE — 97112 NEUROMUSCULAR REEDUCATION: CPT

## 2021-03-23 NOTE — PROGRESS NOTES
Daily Note     Today's date: 3/23/2021  Patient name: Bethel Haley  : 1960  MRN: [id number]  Referring provider: Juan Bridges MD  Dx:   Encounter Diagnosis     ICD-10-CM    1  Pain in both lower extremities  M79 604     M79 605        Start Time: 1700          Subjective: Pt reports that he is feeling less pain since the IE  Reports compliance with HEP      Objective: See treatment diary below      Assessment: Tolerated treatment well  Responding well to flexion based interventions  Did have some difficulty tolerating the prone position for quad stretch, might need to be done in side-lying NV  Patient demonstrated fatigue post treatment      Plan: Continue per plan of care        Precautions: (-)      Manuals                                                                 Neuro Re-Ed             Abd Kathleen  20 :05           Dead bugs             Tball LAQ                                                    Ther Ex             HEP 10            FISItting  10/10:           Pball Rollouts Flex  :10/10           DKC  :10/10           SKC  :10/10           PPT  20 :05            HS Str B  :            Q Str B  :           Ther Activity                                       Gait Training                                       Modalities

## 2021-03-26 ENCOUNTER — HOSPITAL ENCOUNTER (OUTPATIENT)
Dept: NON INVASIVE DIAGNOSTICS | Facility: CLINIC | Age: 61
Discharge: HOME | End: 2021-03-26
Payer: COMMERCIAL

## 2021-03-26 DIAGNOSIS — I10 ESSENTIAL HYPERTENSION: ICD-10-CM

## 2021-03-26 DIAGNOSIS — I11.9 HYPERTENSIVE HEART DISEASE WITHOUT HEART FAILURE: ICD-10-CM

## 2021-03-26 PROCEDURE — 93306 TTE W/DOPPLER COMPLETE: CPT | Performed by: INTERNAL MEDICINE

## 2021-03-26 PROCEDURE — 93306 TTE W/DOPPLER COMPLETE: CPT

## 2021-03-30 LAB
LEFT EYE DIABETIC RETINOPATHY: NORMAL
RIGHT EYE DIABETIC RETINOPATHY: NORMAL

## 2021-03-30 PROCEDURE — 2022F DILAT RTA XM EVC RTNOPTHY: CPT | Performed by: FAMILY MEDICINE

## 2021-04-01 ENCOUNTER — OFFICE VISIT (OUTPATIENT)
Dept: PHYSICAL THERAPY | Facility: CLINIC | Age: 61
End: 2021-04-01
Payer: COMMERCIAL

## 2021-04-01 DIAGNOSIS — M79.605 PAIN IN BOTH LOWER EXTREMITIES: Primary | ICD-10-CM

## 2021-04-01 DIAGNOSIS — M79.604 PAIN IN BOTH LOWER EXTREMITIES: Primary | ICD-10-CM

## 2021-04-01 PROCEDURE — 97112 NEUROMUSCULAR REEDUCATION: CPT | Performed by: PHYSICAL THERAPIST

## 2021-04-01 PROCEDURE — 97110 THERAPEUTIC EXERCISES: CPT | Performed by: PHYSICAL THERAPIST

## 2021-04-01 NOTE — PROGRESS NOTES
Daily Note     Today's date: 2021  Patient name: Dana Cedillo  : 1960  MRN: [id number]  Referring provider: Tana Robles MD  Dx:   Encounter Diagnosis     ICD-10-CM    1  Pain in both lower extremities  M79 604     M79 605                   Subjective: A bit sore after LV, especially Q Stretch      Objective: See treatment diary below      Assessment: Tolerated treatment well  Patient would benefit from continued PT      Plan: Continue per plan of care    Modify Mult TB Rot NV- some radicular symptoms after this     Precautions: (-)      Manuals                                                                Neuro Re-Ed             Abd Kathleen  20 :05 :05 20          Dead bugs             Tball LA          Tball                                                  Ther Ex             HEP 10            FISItting  10/10: :10/20          Pball Rollouts Flex  :10/10 :10/20          DKC  :10/10 :10/10          SKC  :10/10 :10/10          PPT  20 :05  :05 20          HS Str B  :  :          Q Str B  : : SL          Ther Activity                                       Gait Training                                       Modalities

## 2021-04-06 ENCOUNTER — OFFICE VISIT (OUTPATIENT)
Dept: PHYSICAL THERAPY | Facility: CLINIC | Age: 61
End: 2021-04-06
Payer: COMMERCIAL

## 2021-04-06 DIAGNOSIS — M79.604 PAIN IN BOTH LOWER EXTREMITIES: Primary | ICD-10-CM

## 2021-04-06 DIAGNOSIS — M79.605 PAIN IN BOTH LOWER EXTREMITIES: Primary | ICD-10-CM

## 2021-04-06 PROCEDURE — 97112 NEUROMUSCULAR REEDUCATION: CPT

## 2021-04-06 PROCEDURE — 97110 THERAPEUTIC EXERCISES: CPT

## 2021-04-08 ENCOUNTER — OFFICE VISIT (OUTPATIENT)
Dept: PHYSICAL THERAPY | Facility: CLINIC | Age: 61
End: 2021-04-08
Payer: COMMERCIAL

## 2021-04-08 DIAGNOSIS — M79.604 PAIN IN BOTH LOWER EXTREMITIES: Primary | ICD-10-CM

## 2021-04-08 DIAGNOSIS — M79.605 PAIN IN BOTH LOWER EXTREMITIES: Primary | ICD-10-CM

## 2021-04-08 PROCEDURE — 97110 THERAPEUTIC EXERCISES: CPT

## 2021-04-08 NOTE — PROGRESS NOTES
Daily Note     Today's date: 2021  Patient name: Diana Topete  : 1960  MRN: [id number]  Referring provider: Juan Donovan MD  Dx:   Encounter Diagnosis     ICD-10-CM    1  Pain in both lower extremities  M79 604     M79 605                   Subjective: Patient states that he has noticed an improvement with pain at night and standing tolerance duration is increasing since IE  Objective: See treatment diary below      Assessment: Tolerated treatment well  Patient exhibited good technique with therapeutic exercises      Plan: Continue per plan of care        Precautions: (-)      Manuals                                                              Neuro Re-Ed             Abd Kathleen  20 :05 :05 20 :05 20  pball :05x20        Dead bugs             Tball LAQ  20 20 20 20        Tball  20 20 20                                               Ther Ex             HEP 10            FISItting  10/10: :10/20 :10/20 :10x20        Pball Rollouts Flex  :10/10 :10/20 :10/20 :10        DKC  :10/10 :10/10 :10/10  :10/10        SKC  :10/10 :10/10 :10/10  :10/10        PPT  20 :05  :05 20 :05 30          HS Str B  :  : : :20x 6        Q Str B  : : SL : SL :20/3 SL        Ther Activity                                       Gait Training                                       Modalities

## 2021-04-13 ENCOUNTER — OFFICE VISIT (OUTPATIENT)
Dept: PHYSICAL THERAPY | Facility: CLINIC | Age: 61
End: 2021-04-13
Payer: COMMERCIAL

## 2021-04-13 DIAGNOSIS — M79.604 PAIN IN BOTH LOWER EXTREMITIES: Primary | ICD-10-CM

## 2021-04-13 DIAGNOSIS — M79.605 PAIN IN BOTH LOWER EXTREMITIES: Primary | ICD-10-CM

## 2021-04-13 PROCEDURE — 97110 THERAPEUTIC EXERCISES: CPT | Performed by: PHYSICAL THERAPIST

## 2021-04-13 NOTE — PROGRESS NOTES
Daily Note     Today's date: 2021  Patient name: Meredith Harding  : 1960  MRN: [id number]  Referring provider: Lui Riggs MD  Dx:   Encounter Diagnosis     ICD-10-CM    1  Pain in both lower extremities  M79 604     M79 605                   Subjective:  Pt feeling a bit better, less pain overall       Objective: See treatment diary below      Assessment: Tolerated treatment well  Patient making progress with flexion based POC      Plan: Continue per plan of care        Precautions: (-)      Manuals                                                             Neuro Re-Ed             Abd Kathleen  20 :05 :05 20 :05 20  pball :05x20 pball :05 20       Dead bugs       NV      Tball LA 20 20 20 20       Tball  20 20 20 20                                              Ther Ex             HEP 10            FISItting  10/10: :10/20 :10/20 :10x20 :10/20       Pball Rollouts Flex  :10/10 :10/20 :10/20 :10/20 :10/20       DKC  :10/10 :10/10 :10/10  :10/10 :10/10       SKC  :10/10 :10/10 :10/10  :10/10 :10/10       PPT  20 :05  :05 20 :05 30          HS Str B  :20/6  :206 :6 :20x 6 20/6       Q Str B  : :20/6 SL :20/6 SL :20/3 SL :30/3 B       Ther Activity                                       Gait Training                                       Modalities

## 2021-04-15 ENCOUNTER — EVALUATION (OUTPATIENT)
Dept: PHYSICAL THERAPY | Facility: CLINIC | Age: 61
End: 2021-04-15
Payer: COMMERCIAL

## 2021-04-15 DIAGNOSIS — I10 ESSENTIAL (PRIMARY) HYPERTENSION: ICD-10-CM

## 2021-04-15 DIAGNOSIS — M79.605 PAIN IN BOTH LOWER EXTREMITIES: Primary | ICD-10-CM

## 2021-04-15 DIAGNOSIS — M79.604 PAIN IN BOTH LOWER EXTREMITIES: Primary | ICD-10-CM

## 2021-04-15 PROCEDURE — 97110 THERAPEUTIC EXERCISES: CPT | Performed by: PHYSICAL THERAPIST

## 2021-04-15 RX ORDER — OLMESARTAN MEDOXOMIL 40 MG/1
40 TABLET ORAL DAILY
Qty: 90 TABLET | Refills: 2 | Status: SHIPPED | OUTPATIENT
Start: 2021-04-15 | End: 2022-01-06

## 2021-04-15 NOTE — TELEPHONE ENCOUNTER
Needs a new script /refills for Olmesartan 40mg, 1x a day (90 day supply);       Pharmacy [facility] - [phone]     Patient ph # [phone]

## 2021-04-15 NOTE — PROGRESS NOTES
PT ReEvaluation    Today's date: 4/15/2021  Patient name: Renetta Jean  : 1960  MRN: [id number]  Referring provider: Yoselin Renee MD  Dx:   Encounter Diagnosis     ICD-10-CM    1  Pain in both lower extremities  M79 604     M79 605                   Assessment  Assessment details: Patient has improved, is compliant with HEP and will transition to HEP at this time     Impairments: abnormal or restricted ROM, activity intolerance and pain with function  Understanding of Dx/Px/POC: good   Prognosis: good    Goals  ST-6 weeks  1  Patient to be independent with HEP - Met  2  Decrease pain at least 2 subjective levels  - Met    LT-12 weeks  1    Patient to voice comfort with self management of condition - Met  2   75% or > decreased pain  - Met  3   75% or > decreased functional deficits  - Met  4  Normalize AROM of all deficit planes  - Met  7  Patient to voice understanding of activities/positions to avoid  - Met      Plan  Plan details: Attempt HEP, return to PT prn  Patient would benefit from: skilled PT  Referral necessary: No  Planned modality interventions: cryotherapy  Planned therapy interventions: IADL retraining, joint mobilization, manual therapy, motor coordination training, neuromuscular re-education, patient education, postural training, self care, strengthening, stretching, therapeutic activities, therapeutic exercise, home exercise program, flexibility, ADL training, balance and body mechanics training  Frequency: 2x week  Duration in weeks: 6  Treatment plan discussed with: patient        Subjective Evaluation    History of Present Illness  Onset date: Months and months ago  Mechanism of injury: Patient feels 75% improvement  He has less pain, improved ability to sleep, stand and cook  He is compliant with HEP and feels ready to transition to HEP at this time        Patient Goals: Resolve problem or improve problem    Quality of life: good    Pain  At best pain rating: 0  At worst pain ratin  Location: B LE distal calves R > L          Objective     Active Range of Motion     Lumbar   Flexion:  WFL  Extension:  with pain Restriction level: minimal    Joint Play     Hypomobile: L4, L5 and S1   Mechanical Assessment    Cervical      Thoracic      Lumbar    Standing extension: repeated movements  Pain level: produced  Lying extension: repeated movements  Pain level: produced    General Comments:      Lumbar Comments  (-) Knee/Ankle/Calf Screen  (+) Quadrant closing B  (-) Remainder special testing             Precautions: (-)         Manuals  03/23 4/1 04/06 4/8 4/13 4/15                                                          Neuro Re-Ed             Abd Kathleen  20 :05 :05 20 :05 20  pball :05x20 pball :05 20 pball :05 20      Dead bugs             Tball LAQ  20 20 20 20 20 20      Tball March  20 20 20 20 20 20                                             Ther Ex             HEP 10            FISItting  10/10: :10/20 :10/20 :10x20 :10/20 :10/20      Pball Rollouts Flex  :10/10 :10/20 :10/20 :10/20 :10/20 :10/20      DKC  :10/10 :10/10 :10/10  :10/10 :10/10 :10/10      SKC  :10/10 :10/10 :10/10  :10/10 :10/10 :10/10      PPT  20 :05  :05 20 :05 30          HS Str B  :20/6  :20/6 :20/6 :20x 6 20/6 :20/6      Q Str B  :20/6 :20/6 SL :20/6 SL :20/3 SL :30/3 B :30/3 B      Ther Activity                                       Gait Training                                       Modalities

## 2021-04-20 ENCOUNTER — APPOINTMENT (OUTPATIENT)
Dept: PHYSICAL THERAPY | Facility: CLINIC | Age: 61
End: 2021-04-20
Payer: COMMERCIAL

## 2021-04-22 ENCOUNTER — APPOINTMENT (OUTPATIENT)
Dept: PHYSICAL THERAPY | Facility: CLINIC | Age: 61
End: 2021-04-22
Payer: COMMERCIAL

## 2021-04-27 ENCOUNTER — APPOINTMENT (OUTPATIENT)
Dept: PHYSICAL THERAPY | Facility: CLINIC | Age: 61
End: 2021-04-27
Payer: COMMERCIAL

## 2021-04-29 ENCOUNTER — APPOINTMENT (OUTPATIENT)
Dept: PHYSICAL THERAPY | Facility: CLINIC | Age: 61
End: 2021-04-29
Payer: COMMERCIAL

## 2021-04-30 ENCOUNTER — APPOINTMENT (OUTPATIENT)
Dept: LAB | Facility: AMBULARY SURGERY CENTER | Age: 61
End: 2021-04-30
Payer: COMMERCIAL

## 2021-04-30 DIAGNOSIS — E55.9 HYPOVITAMINOSIS D: ICD-10-CM

## 2021-04-30 DIAGNOSIS — E87.0 HYPEROSMOLALITY AND/OR HYPERNATREMIA: ICD-10-CM

## 2021-04-30 DIAGNOSIS — E78.5 DYSLIPIDEMIA: ICD-10-CM

## 2021-04-30 DIAGNOSIS — E11.9 DIABETES MELLITUS WITHOUT COMPLICATION (HCC): ICD-10-CM

## 2021-04-30 DIAGNOSIS — E83.52 HYPERCALCEMIA: ICD-10-CM

## 2021-04-30 LAB
25(OH)D3 SERPL-MCNC: 34.7 NG/ML (ref 30–100)
CHOLEST SERPL-MCNC: 150 MG/DL (ref 50–200)
EST. AVERAGE GLUCOSE BLD GHB EST-MCNC: 137 MG/DL
HBA1C MFR BLD: 6.4 %
HDLC SERPL-MCNC: 54 MG/DL
LDLC SERPL CALC-MCNC: 71 MG/DL (ref 0–100)
NONHDLC SERPL-MCNC: 96 MG/DL
PHOSPHATE SERPL-MCNC: 3.9 MG/DL (ref 2.3–4.1)
TRIGL SERPL-MCNC: 125 MG/DL

## 2021-04-30 PROCEDURE — 82306 VITAMIN D 25 HYDROXY: CPT

## 2021-04-30 PROCEDURE — 82088 ASSAY OF ALDOSTERONE: CPT

## 2021-04-30 PROCEDURE — 80061 LIPID PANEL: CPT

## 2021-04-30 PROCEDURE — 84100 ASSAY OF PHOSPHORUS: CPT

## 2021-04-30 PROCEDURE — 84244 ASSAY OF RENIN: CPT

## 2021-04-30 PROCEDURE — 83036 HEMOGLOBIN GLYCOSYLATED A1C: CPT

## 2021-04-30 PROCEDURE — 36415 COLL VENOUS BLD VENIPUNCTURE: CPT

## 2021-05-03 ENCOUNTER — APPOINTMENT (OUTPATIENT)
Dept: LAB | Facility: AMBULARY SURGERY CENTER | Age: 61
End: 2021-05-03
Payer: COMMERCIAL

## 2021-05-03 PROCEDURE — 82088 ASSAY OF ALDOSTERONE: CPT

## 2021-05-06 LAB
ALDOST SERPL-MCNC: 7.8 NG/DL (ref 0–30)
ALDOST/RENIN PLAS-RTO: 8.2 {RATIO} (ref 0–30)
RENIN PLAS-CCNC: 0.95 NG/ML/HR (ref 0.17–5.38)

## 2021-05-13 LAB
ALDOST 24H UR-MRATE: 5.27 UG/24 HR (ref 0–19)
ALDOST UR-MCNC: 4.79 UG/L

## 2021-06-02 DIAGNOSIS — I10 ESSENTIAL HYPERTENSION: Primary | ICD-10-CM

## 2021-06-02 RX ORDER — TRIAMTERENE AND HYDROCHLOROTHIAZIDE 37.5; 25 MG/1; MG/1
1 TABLET ORAL DAILY
Qty: 90 TABLET | Refills: 3 | Status: SHIPPED | OUTPATIENT
Start: 2021-06-02 | End: 2022-05-24

## 2021-06-17 PROBLEM — Z00.00 ANNUAL PHYSICAL EXAM: Status: ACTIVE | Noted: 2021-06-17

## 2021-06-17 PROBLEM — E66.811 CLASS 1 OBESITY DUE TO EXCESS CALORIES WITH SERIOUS COMORBIDITY AND BODY MASS INDEX (BMI) OF 34.0 TO 34.9 IN ADULT: Status: ACTIVE | Noted: 2021-06-17

## 2021-06-17 PROBLEM — E66.09 CLASS 1 OBESITY DUE TO EXCESS CALORIES WITH SERIOUS COMORBIDITY AND BODY MASS INDEX (BMI) OF 34.0 TO 34.9 IN ADULT: Status: ACTIVE | Noted: 2021-06-17

## 2021-06-17 NOTE — PROGRESS NOTES
Assessment/Plan:         Problem List Items Addressed This Visit        Endocrine    Diabetes mellitus without complication ([address] ) - Primary       Lab Results   Component Value Date    HGBA1C 6 4 (H) 04/30/2021   well controlled and sees endocrinologist         Relevant Medications    Jardiance 10 MG TABS       Respiratory    JERO (obstructive sleep apnea)     On cpap            Cardiovascular and Mediastinum    Essential hypertension       Nervous and Auditory    Myasthenia gravis ([address] )     Stable on meds            Other    Dyslipidemia     Labs ok           Annual physical exam    Class 1 obesity due to excess calories with serious comorbidity and body mass index (BMI) of 34 0 to 34 9 in adult     discussion on  Diet and exercise         Umbilical hernia without obstruction and without gangrene     Will get surgeon opinion         Relevant Orders    Ambulatory referral to General Surgery            Subjective:  Pt here for annual physical and interval visit multiple medical problems lab and HM review     Patient ID: Dana Cedillo is a 64 y o  male  HPI    The following portions of the patient's history were reviewed and updated as appropriate:   Past Medical History:  He has a past medical history of Carcinoid tumor of colon, Colon polyp, CPAP (continuous positive airway pressure) dependence, Cyst of skin and subcutaneous tissue, Diabetes mellitus ([address] ), Hyperlipidemia, Hypertension, Myasthenia gravis ([address] ), and Sleep apnea ,  _______________________________________________________________________  Medical Problems:  does not have any pertinent problems on file ,  _______________________________________________________________________  Past Surgical History:   has a past surgical history that includes Dental surgery; Nasal polyp excision; pr colonoscopy flx dx w/collj spec when pfrmd (N/A, 7/19/2016); Back surgery (01/01/2001);  Colonoscopy; [place] retrograde pyelogram (6/25/2020); pr cystourethroscopy,fulgur <0 5 cm lesn (N/A, 6/25/2020); and pr cystourethroscopy,ureter catheter (Bilateral, 6/25/2020)  ,  _______________________________________________________________________  Family History:  family history includes Diabetes in his mother; Heart disease in his father; Heart failure in his father; No Known Problems in his sister; Prostate cancer in his father ,  _______________________________________________________________________  Social History:   reports that he has never smoked  He has never used smokeless tobacco  He reports current alcohol use of about 1 0 standard drinks of alcohol per week  He reports that he does not use drugs  ,  _______________________________________________________________________  Allergies:  has No Known Allergies     _______________________________________________________________________  Current Outpatient Medications   Medication Sig Dispense Refill    Accu-Chek Danielle Plus test strip TEST BLOOD SUGARS ONCE DAILY      amLODIPine (NORVASC) 10 mg tablet TAKE 1 TABLET EVERY DAY 90 tablet 2    Ascorbic Acid (VITAMIN C) 500 MG CAPS Take 1 tablet by mouth daily      aspirin 81 MG tablet Take 81 mg by mouth daily        atorvastatin (LIPITOR) 40 mg tablet Take 40 mg by mouth daily at bedtime  4    B-D UF III MINI PEN NEEDLES 31G X 5 MM MISC USE 1 DAILY  5    Basaglar KwikPen 100 units/mL injection pen INJECT UP  UNITS DAILY AS DIRECTED      BD Pen Needle Nelsy U/F 32G X 4 MM MISC 2 (two) times a day Test      Cholecalciferol ([employer] VITAMIN D) 2000 units CAPS Take 2 tablets by mouth 2 (two) times a day      Cinnamon 500 MG TABS Take by mouth      coenzyme Q-10 100 MG capsule Take 2 capsules by mouth daily      glipiZIDE (GLUCOTROL XL) 10 mg 24 hr tablet Take 5 mg by mouth daily       JANUVIA 100 MG tablet Take 100 mg by mouth daily  2    Jardiance 10 MG TABS       metFORMIN (GLUCOPHAGE-XR) 500 mg 24 hr tablet Take 1,000 mg by mouth 2 (two) times a day      metoprolol succinate (TOPROL-XL) 100 mg 24 hr tablet TAKE 1 TABLET BY MOUTH DAILY 90 tablet 3    Multiple Vitamin (MULTI-VITAMIN DAILY) TABS Take 1 tablet by mouth daily      NovoLOG FlexPen 100 units/mL injection pen INJECT 8 UNITS SUBCUTANEOUSLY BEFORE DINNER      olmesartan (BENICAR) 40 mg tablet Take 1 tablet (40 mg total) by mouth daily 90 tablet 2    pyridostigmine (MESTINON) 60 mg tablet Take 60 mg by mouth 3 (three) times a day   triamterene-hydrochlorothiazide (MAXZIDE-25) 37 5-25 mg per tablet Take 1 tablet by mouth daily 90 tablet 3    VASCEPA 1 g CAPS TAKE 1 CAPSULE BY MOUTH TWICE A DAY WITH MEALS  2     No current facility-administered medications for this visit      _______________________________________________________________________  Review of Systems   Constitutional: Negative for appetite change, chills, fatigue and fever  Respiratory: Negative for cough, chest tightness and shortness of breath  Cardiovascular: Negative for chest pain, palpitations and leg swelling  Gastrointestinal: Negative for abdominal pain, constipation, diarrhea, nausea and vomiting  Genitourinary: Negative for difficulty urinating and frequency  Musculoskeletal: Negative for arthralgias, back pain and neck pain  Skin: Negative for rash  Neurological: Negative for dizziness, weakness, light-headedness, numbness and headaches  Hematological: Does not bruise/bleed easily  Psychiatric/Behavioral: Negative for dysphoric mood and sleep disturbance  The patient is not nervous/anxious  Objective:  Vitals:    06/22/21 1007   BP: 130/64   Pulse: 74   Temp: (!) 97 1 °F (36 2 °C)   SpO2: 97%   Weight: 92 5 kg (204 lb)   Height: 5' 6" (1 676 m)     Body mass index is 32 93 kg/m²  Physical Exam  Vitals reviewed  Constitutional:       General: He is not in acute distress  Appearance: Normal appearance  He is not ill-appearing     HENT:      Right Ear: Tympanic membrane, ear canal and external ear normal  Left Ear: Tympanic membrane, ear canal and external ear normal       Nose: Nose normal    Eyes:      General: Lids are normal       Extraocular Movements: Extraocular movements intact  Conjunctiva/sclera: Conjunctivae normal       Pupils: Pupils are equal, round, and reactive to light  Neck:      Thyroid: No thyromegaly  Vascular: No carotid bruit  Cardiovascular:      Rate and Rhythm: Normal rate and regular rhythm  Pulses: Normal pulses  Heart sounds: Normal heart sounds  Pulmonary:      Effort: Pulmonary effort is normal       Breath sounds: Normal breath sounds  Chest:      Breasts:         Right: Normal          Left: Normal    Abdominal:      General: Bowel sounds are normal  There is no distension  Palpations: Abdomen is soft  There is no mass  Tenderness: There is no abdominal tenderness  Hernia: No hernia is present  Musculoskeletal:         General: Normal range of motion  Cervical back: Full passive range of motion without pain, normal range of motion and neck supple  Lymphadenopathy:      Cervical: No cervical adenopathy  Skin:     General: Skin is warm and dry  Neurological:      General: No focal deficit present  Mental Status: He is alert and oriented to person, place, and time  Mental status is at baseline  Cranial Nerves: No cranial nerve deficit  Sensory: No sensory deficit  Motor: No weakness or tremor  Coordination: Coordination normal       Gait: Gait normal       Deep Tendon Reflexes: Reflexes normal    Psychiatric:         Mood and Affect: Mood normal          Speech: Speech normal          Behavior: Behavior normal        BMI Counseling: Body mass index is 32 93 kg/m²   The BMI is above normal  Nutrition recommendations include 3-5 servings of fruits/vegetables daily, reducing fast food intake, consuming healthier snacks, decreasing soda and/or juice intake, moderation in carbohydrate intake and increasing intake of lean protein  Exercise recommendations include exercising 3-5 times per week and strength training exercises

## 2021-06-22 ENCOUNTER — OFFICE VISIT (OUTPATIENT)
Dept: FAMILY MEDICINE CLINIC | Facility: CLINIC | Age: 61
End: 2021-06-22
Payer: COMMERCIAL

## 2021-06-22 VITALS
TEMPERATURE: 97.1 F | OXYGEN SATURATION: 97 % | BODY MASS INDEX: 32.78 KG/M2 | WEIGHT: 204 LBS | DIASTOLIC BLOOD PRESSURE: 64 MMHG | HEART RATE: 74 BPM | HEIGHT: 66 IN | SYSTOLIC BLOOD PRESSURE: 130 MMHG

## 2021-06-22 DIAGNOSIS — E66.09 CLASS 1 OBESITY DUE TO EXCESS CALORIES WITH SERIOUS COMORBIDITY AND BODY MASS INDEX (BMI) OF 34.0 TO 34.9 IN ADULT: ICD-10-CM

## 2021-06-22 DIAGNOSIS — Z00.00 ANNUAL PHYSICAL EXAM: ICD-10-CM

## 2021-06-22 DIAGNOSIS — E78.5 DYSLIPIDEMIA: ICD-10-CM

## 2021-06-22 DIAGNOSIS — I10 ESSENTIAL HYPERTENSION: ICD-10-CM

## 2021-06-22 DIAGNOSIS — G70.00 MYASTHENIA GRAVIS (HCC): ICD-10-CM

## 2021-06-22 DIAGNOSIS — K42.9 UMBILICAL HERNIA WITHOUT OBSTRUCTION AND WITHOUT GANGRENE: ICD-10-CM

## 2021-06-22 DIAGNOSIS — Z11.59 NEED FOR HEPATITIS C SCREENING TEST: ICD-10-CM

## 2021-06-22 DIAGNOSIS — Z11.4 SCREENING FOR HIV (HUMAN IMMUNODEFICIENCY VIRUS): ICD-10-CM

## 2021-06-22 DIAGNOSIS — E11.9 DIABETES MELLITUS WITHOUT COMPLICATION (HCC): Primary | ICD-10-CM

## 2021-06-22 DIAGNOSIS — G47.33 OSA (OBSTRUCTIVE SLEEP APNEA): ICD-10-CM

## 2021-06-22 PROCEDURE — 99396 PREV VISIT EST AGE 40-64: CPT | Performed by: FAMILY MEDICINE

## 2021-06-22 PROCEDURE — 3725F SCREEN DEPRESSION PERFORMED: CPT | Performed by: FAMILY MEDICINE

## 2021-06-22 RX ORDER — AMLODIPINE BESYLATE 10 MG/1
TABLET ORAL
Qty: 90 TABLET | Refills: 2 | Status: SHIPPED | OUTPATIENT
Start: 2021-06-22 | End: 2022-03-15

## 2021-06-22 RX ORDER — EMPAGLIFLOZIN 10 MG/1
10 TABLET, FILM COATED ORAL DAILY
COMMUNITY
Start: 2021-06-03

## 2021-06-22 NOTE — ASSESSMENT & PLAN NOTE
Lab Results   Component Value Date    HGBA1C 6 4 (H) 04/30/2021   well controlled and sees endocrinologist

## 2021-06-28 ENCOUNTER — CONSULT (OUTPATIENT)
Dept: SURGERY | Facility: CLINIC | Age: 61
End: 2021-06-28
Payer: COMMERCIAL

## 2021-06-28 VITALS
BODY MASS INDEX: 32.95 KG/M2 | HEIGHT: 66 IN | SYSTOLIC BLOOD PRESSURE: 120 MMHG | WEIGHT: 205 LBS | HEART RATE: 76 BPM | DIASTOLIC BLOOD PRESSURE: 72 MMHG | RESPIRATION RATE: 18 BRPM | TEMPERATURE: 98.3 F

## 2021-06-28 DIAGNOSIS — K42.9 UMBILICAL HERNIA WITHOUT OBSTRUCTION AND WITHOUT GANGRENE: Primary | ICD-10-CM

## 2021-06-28 DIAGNOSIS — Z01.818 ENCOUNTER FOR PREADMISSION TESTING: ICD-10-CM

## 2021-06-28 PROCEDURE — 3078F DIAST BP <80 MM HG: CPT | Performed by: SURGERY

## 2021-06-28 PROCEDURE — 3008F BODY MASS INDEX DOCD: CPT | Performed by: SURGERY

## 2021-06-28 PROCEDURE — 99244 OFF/OP CNSLTJ NEW/EST MOD 40: CPT | Performed by: SURGERY

## 2021-06-28 PROCEDURE — 1036F TOBACCO NON-USER: CPT | Performed by: SURGERY

## 2021-06-28 PROCEDURE — 3074F SYST BP LT 130 MM HG: CPT | Performed by: SURGERY

## 2021-06-28 NOTE — PROGRESS NOTES
Assessment/Plan:  Patient has a small reducible umbilical hernia  I explained the procedure to him  The procedure explained was open repair of umbilical hernia with mesh  All his questions were answered  Complications included recurrence, bleeding, infection were discussed  He verbalized understanding  He signed the consent  The procedure  Will be scheduled for July 8, 2021  No problem-specific Assessment & Plan notes found for this encounter  Diagnoses and all orders for this visit:    Umbilical hernia without obstruction and without gangrene  -     Ambulatory referral to General Surgery    Encounter for preadmission testing  -     CBC and differential; Future  -     Comprehensive metabolic panel; Future  -     ECG 12 lead; Future          Subjective:      Patient ID: Jeimy Montiel is a 64 y o  male  60-year-old male patient came to my office with complaints of abdominal wall swelling  He says this has been there for about 2 months  This swelling was diagnosed as umbilical hernia when he went to his primary care physician for routine checkup  No complaints of nausea or vomiting  No complaints of increase in the size of the swelling in last 2 months  The swelling reduces when he lays down  Patient had a TURBT and is scheduled for a surveillance cystoscopy  He is a diabetic but his last hemoglobin A1c was 6 4  He takes aspirin  The following portions of the patient's history were reviewed and updated as appropriate:   He  has a past medical history of Carcinoid tumor of colon, Colon polyp, CPAP (continuous positive airway pressure) dependence, Cyst of skin and subcutaneous tissue, Diabetes mellitus ([address] ), Hyperlipidemia, Hypertension, Myasthenia gravis ([address] ), and Sleep apnea    He   Patient Active Problem List    Diagnosis Date Noted    Umbilical hernia without obstruction and without gangrene 06/22/2021    Annual physical exam 06/17/2021    Class 1 obesity due to excess calories with serious comorbidity and body mass index (BMI) of 34 0 to 34 9 in adult 06/17/2021    Pain in both lower extremities 02/22/2021    Hypertensive heart disease without heart failure 01/29/2021    Cystitis glandularis 07/16/2020    Essential hypertension 06/16/2020    Diabetes mellitus without complication ([facility] ) 92/88/7540    Dyslipidemia 06/16/2020    JERO (obstructive sleep apnea) 06/16/2020    Myasthenia gravis ([facility] ) 06/16/2020    History of malignant carcinoid tumor of rectum 06/16/2020    Bladder wall thickening 05/07/2020     He  has a past surgical history that includes Dental surgery; Nasal polyp excision; pr colonoscopy flx dx w/collj spec when pfrmd (N/A, 7/19/2016); Back surgery (01/01/2001); Colonoscopy; [place] retrograde pyelogram (6/25/2020); pr cystourethroscopy,fulgur <0 5 cm lesn (N/A, 6/25/2020); and pr cystourethroscopy,ureter catheter (Bilateral, 6/25/2020)  His family history includes Diabetes in his mother; Heart disease in his father; Heart failure in his father; No Known Problems in his sister; Prostate cancer in his father  He  reports that he has never smoked  He has never used smokeless tobacco  He reports current alcohol use of about 1 0 standard drinks of alcohol per week  He reports that he does not use drugs  Current Outpatient Medications   Medication Sig Dispense Refill    Accu-Chek Danielle Plus test strip TEST BLOOD SUGARS ONCE DAILY      amLODIPine (NORVASC) 10 mg tablet TAKE 1 TABLET EVERY DAY 90 tablet 2    Ascorbic Acid (VITAMIN C) 500 MG CAPS Take 1 tablet by mouth daily      aspirin 81 MG tablet Take 81 mg by mouth daily        atorvastatin (LIPITOR) 40 mg tablet Take 40 mg by mouth daily at bedtime  4    B-D UF III MINI PEN NEEDLES 31G X 5 MM MISC USE 1 DAILY  5    Basaglar KwikPen 100 units/mL injection pen INJECT UP  UNITS DAILY AS DIRECTED      BD Pen Needle Nelsy U/F 32G X 4 MM MISC 2 (two) times a day Test      Cholecalciferol ([employer]S VITAMIN D) 2000 units CAPS Take 2 tablets by mouth 2 (two) times a day      Cinnamon 500 MG TABS Take by mouth      coenzyme Q-10 100 MG capsule Take 2 capsules by mouth daily      glipiZIDE (GLUCOTROL XL) 10 mg 24 hr tablet Take 5 mg by mouth daily       JANUVIA 100 MG tablet Take 100 mg by mouth daily  2    Jardiance 10 MG TABS       metFORMIN (GLUCOPHAGE-XR) 500 mg 24 hr tablet Take 1,000 mg by mouth 2 (two) times a day      metoprolol succinate (TOPROL-XL) 100 mg 24 hr tablet TAKE 1 TABLET BY MOUTH DAILY 90 tablet 3    Multiple Vitamin (MULTI-VITAMIN DAILY) TABS Take 1 tablet by mouth daily      NovoLOG FlexPen 100 units/mL injection pen INJECT 8 UNITS SUBCUTANEOUSLY BEFORE DINNER      olmesartan (BENICAR) 40 mg tablet Take 1 tablet (40 mg total) by mouth daily 90 tablet 2    pyridostigmine (MESTINON) 60 mg tablet Take 60 mg by mouth 3 (three) times a day   triamterene-hydrochlorothiazide (MAXZIDE-25) 37 5-25 mg per tablet Take 1 tablet by mouth daily 90 tablet 3    VASCEPA 1 g CAPS TAKE 1 CAPSULE BY MOUTH TWICE A DAY WITH MEALS  2     No current facility-administered medications for this visit  Current Outpatient Medications on File Prior to Visit   Medication Sig    Accu-Chek Danielle Plus test strip TEST BLOOD SUGARS ONCE DAILY    amLODIPine (NORVASC) 10 mg tablet TAKE 1 TABLET EVERY DAY    Ascorbic Acid (VITAMIN C) 500 MG CAPS Take 1 tablet by mouth daily    aspirin 81 MG tablet Take 81 mg by mouth daily      atorvastatin (LIPITOR) 40 mg tablet Take 40 mg by mouth daily at bedtime    B-D UF III MINI PEN NEEDLES 31G X 5 MM MISC USE 1 DAILY    Basaglar KwikPen 100 units/mL injection pen INJECT UP  UNITS DAILY AS DIRECTED    BD Pen Needle Nelsy U/F 32G X 4 MM MISC 2 (two) times a day Test    Cholecalciferol (CVS VITAMIN D) 2000 units CAPS Take 2 tablets by mouth 2 (two) times a day    Cinnamon 500 MG TABS Take by mouth    coenzyme Q-10 100 MG capsule Take 2 capsules by mouth daily    glipiZIDE (GLUCOTROL XL) 10 mg 24 hr tablet Take 5 mg by mouth daily     JANUVIA 100 MG tablet Take 100 mg by mouth daily    Jardiance 10 MG TABS     metFORMIN (GLUCOPHAGE-XR) 500 mg 24 hr tablet Take 1,000 mg by mouth 2 (two) times a day    metoprolol succinate (TOPROL-XL) 100 mg 24 hr tablet TAKE 1 TABLET BY MOUTH DAILY    Multiple Vitamin (MULTI-VITAMIN DAILY) TABS Take 1 tablet by mouth daily    NovoLOG FlexPen 100 units/mL injection pen INJECT 8 UNITS SUBCUTANEOUSLY BEFORE DINNER    olmesartan (BENICAR) 40 mg tablet Take 1 tablet (40 mg total) by mouth daily    pyridostigmine (MESTINON) 60 mg tablet Take 60 mg by mouth 3 (three) times a day   triamterene-hydrochlorothiazide (MAXZIDE-25) 37 5-25 mg per tablet Take 1 tablet by mouth daily    VASCEPA 1 g CAPS TAKE 1 CAPSULE BY MOUTH TWICE A DAY WITH MEALS     No current facility-administered medications on file prior to visit  He has No Known Allergies       Review of Systems   Constitutional: Negative  HENT: Negative  Eyes: Negative  Respiratory: Negative  Cardiovascular: Negative  Gastrointestinal: Negative  Endocrine: Negative  Genitourinary: Negative  Musculoskeletal: Negative  Skin: Negative  Allergic/Immunologic: Negative  Neurological: Negative  Hematological: Negative  Psychiatric/Behavioral: Negative  Objective:      /72 (BP Location: Left arm, Patient Position: Sitting, Cuff Size: Adult)   Pulse 76   Temp 98 3 °F (36 8 °C)   Resp 18   Ht 5' 6" (1 676 m)   Wt 93 kg (205 lb)   BMI 33 09 kg/m²          Physical Exam  Vitals reviewed  Constitutional:       Appearance: He is obese  HENT:      Head: Normocephalic  Nose: Nose normal       Mouth/Throat:      Mouth: Mucous membranes are moist    Eyes:      Pupils: Pupils are equal, round, and reactive to light  Cardiovascular:      Rate and Rhythm: Normal rate and regular rhythm  Pulses: Normal pulses        Heart sounds: Normal heart sounds  Pulmonary:      Effort: Pulmonary effort is normal       Breath sounds: Normal breath sounds  Abdominal:      General: Abdomen is flat  There is no distension  Palpations: Abdomen is soft  Tenderness: There is no abdominal tenderness  There is no guarding  Hernia: A hernia (Reducible less than 2 cm umbilical hernia ) is present  Musculoskeletal:         General: Normal range of motion  Cervical back: Normal range of motion  Skin:     General: Skin is warm and dry  Neurological:      General: No focal deficit present  Mental Status: He is alert and oriented to person, place, and time     Psychiatric:         Mood and Affect: Mood normal          Behavior: Behavior normal

## 2021-06-28 NOTE — H&P (VIEW-ONLY)
Assessment/Plan:  Patient has an umbilical hernia  I explained the procedure to him  This the procedure will be done at [facility] on July 8  Complications including but not limited to infection, bleeding, recurrence were explained  He signed the consent  No problem-specific Assessment & Plan notes found for this encounter  Diagnoses and all orders for this visit:    Umbilical hernia without obstruction and without gangrene  -     Ambulatory referral to General Surgery    Encounter for preadmission testing  -     CBC and differential; Future  -     Comprehensive metabolic panel; Future  -     ECG 12 lead; Future          Subjective:      Patient ID: Mesfin Card is a 64 y o  male  63-year-old male patient came to my office with complaints of abdominal wall swelling for last 2 months  The swelling was diagnosed as umbilical hernia when he visited his primary care physician who referred him to see me  No complaints of nausea vomiting  No complaints of pain in the swelling  No complaints of constipation diarrhea  Patient recently had a TURBT  Patient takes aspirin  He has history of diabetes and his last hemoglobin A1c was 6 4  The following portions of the patient's history were reviewed and updated as appropriate:   He  has a past medical history of Carcinoid tumor of colon, Colon polyp, CPAP (continuous positive airway pressure) dependence, Cyst of skin and subcutaneous tissue, Diabetes mellitus ([address] ), Hyperlipidemia, Hypertension, Myasthenia gravis ([facility] [address] ), and Sleep apnea    He   Patient Active Problem List    Diagnosis Date Noted    Umbilical hernia without obstruction and without gangrene 06/22/2021    Annual physical exam 06/17/2021    Class 1 obesity due to excess calories with serious comorbidity and body mass index (BMI) of 34 0 to 34 9 in adult 06/17/2021    Pain in both lower extremities 02/22/2021    Hypertensive heart disease without heart failure 01/29/2021    Cystitis glandularis 07/16/2020    Essential hypertension 06/16/2020    Diabetes mellitus without complication ([facility] ) 47/09/5115    Dyslipidemia 06/16/2020    JERO (obstructive sleep apnea) 06/16/2020    Myasthenia gravis ([facility] ) 06/16/2020    History of malignant carcinoid tumor of rectum 06/16/2020    Bladder wall thickening 05/07/2020     He  has a past surgical history that includes Dental surgery; Nasal polyp excision; pr colonoscopy flx dx w/collj spec when pfrmd (N/A, 7/19/2016); Back surgery (01/01/2001); Colonoscopy; [place] retrograde pyelogram (6/25/2020); pr cystourethroscopy,fulgur <0 5 cm lesn (N/A, 6/25/2020); and pr cystourethroscopy,ureter catheter (Bilateral, 6/25/2020)  His family history includes Diabetes in his mother; Heart disease in his father; Heart failure in his father; No Known Problems in his sister; Prostate cancer in his father  He  reports that he has never smoked  He has never used smokeless tobacco  He reports current alcohol use of about 1 0 standard drinks of alcohol per week  He reports that he does not use drugs  Current Outpatient Medications   Medication Sig Dispense Refill    Accu-Chek Danielle Plus test strip TEST BLOOD SUGARS ONCE DAILY      amLODIPine (NORVASC) 10 mg tablet TAKE 1 TABLET EVERY DAY 90 tablet 2    Ascorbic Acid (VITAMIN C) 500 MG CAPS Take 1 tablet by mouth daily      aspirin 81 MG tablet Take 81 mg by mouth daily        atorvastatin (LIPITOR) 40 mg tablet Take 40 mg by mouth daily at bedtime  4    B-D UF III MINI PEN NEEDLES 31G X 5 MM MISC USE 1 DAILY  5    Basaglar KwikPen 100 units/mL injection pen INJECT UP  UNITS DAILY AS DIRECTED      BD Pen Needle Nelsy U/F 32G X 4 MM MISC 2 (two) times a day Test      Cholecalciferol (CVS VITAMIN D) 2000 units CAPS Take 2 tablets by mouth 2 (two) times a day      Cinnamon 500 MG TABS Take by mouth      coenzyme Q-10 100 MG capsule Take 2 capsules by mouth daily      glipiZIDE (GLUCOTROL XL) 10 mg 24 hr tablet Take 5 mg by mouth daily       JANUVIA 100 MG tablet Take 100 mg by mouth daily  2    Jardiance 10 MG TABS       metFORMIN (GLUCOPHAGE-XR) 500 mg 24 hr tablet Take 1,000 mg by mouth 2 (two) times a day      metoprolol succinate (TOPROL-XL) 100 mg 24 hr tablet TAKE 1 TABLET BY MOUTH DAILY 90 tablet 3    Multiple Vitamin (MULTI-VITAMIN DAILY) TABS Take 1 tablet by mouth daily      NovoLOG FlexPen 100 units/mL injection pen INJECT 8 UNITS SUBCUTANEOUSLY BEFORE DINNER      olmesartan (BENICAR) 40 mg tablet Take 1 tablet (40 mg total) by mouth daily 90 tablet 2    pyridostigmine (MESTINON) 60 mg tablet Take 60 mg by mouth 3 (three) times a day   triamterene-hydrochlorothiazide (MAXZIDE-25) 37 5-25 mg per tablet Take 1 tablet by mouth daily 90 tablet 3    VASCEPA 1 g CAPS TAKE 1 CAPSULE BY MOUTH TWICE A DAY WITH MEALS  2     No current facility-administered medications for this visit  Current Outpatient Medications on File Prior to Visit   Medication Sig    Accu-Chek Danielle Plus test strip TEST BLOOD SUGARS ONCE DAILY    amLODIPine (NORVASC) 10 mg tablet TAKE 1 TABLET EVERY DAY    Ascorbic Acid (VITAMIN C) 500 MG CAPS Take 1 tablet by mouth daily    aspirin 81 MG tablet Take 81 mg by mouth daily      atorvastatin (LIPITOR) 40 mg tablet Take 40 mg by mouth daily at bedtime    B-D UF III MINI PEN NEEDLES 31G X 5 MM MISC USE 1 DAILY    Basaglar KwikPen 100 units/mL injection pen INJECT UP  UNITS DAILY AS DIRECTED    BD Pen Needle Nelsy U/F 32G X 4 MM MISC 2 (two) times a day Test    Cholecalciferol (CVS VITAMIN D) 2000 units CAPS Take 2 tablets by mouth 2 (two) times a day    Cinnamon 500 MG TABS Take by mouth    coenzyme Q-10 100 MG capsule Take 2 capsules by mouth daily    glipiZIDE (GLUCOTROL XL) 10 mg 24 hr tablet Take 5 mg by mouth daily     JANUVIA 100 MG tablet Take 100 mg by mouth daily    Jardiance 10 MG TABS     metFORMIN (GLUCOPHAGE-XR) 500 mg 24 hr tablet Take 1,000 mg by mouth 2 (two) times a day    metoprolol succinate (TOPROL-XL) 100 mg 24 hr tablet TAKE 1 TABLET BY MOUTH DAILY    Multiple Vitamin (MULTI-VITAMIN DAILY) TABS Take 1 tablet by mouth daily    NovoLOG FlexPen 100 units/mL injection pen INJECT 8 UNITS SUBCUTANEOUSLY BEFORE DINNER    olmesartan (BENICAR) 40 mg tablet Take 1 tablet (40 mg total) by mouth daily    pyridostigmine (MESTINON) 60 mg tablet Take 60 mg by mouth 3 (three) times a day   triamterene-hydrochlorothiazide (MAXZIDE-25) 37 5-25 mg per tablet Take 1 tablet by mouth daily    VASCEPA 1 g CAPS TAKE 1 CAPSULE BY MOUTH TWICE A DAY WITH MEALS     No current facility-administered medications on file prior to visit  He has No Known Allergies       Review of Systems   Constitutional: Negative  HENT: Negative  Eyes: Negative  Respiratory: Negative  Cardiovascular: Negative  Gastrointestinal: Negative  Endocrine: Negative  Genitourinary: Negative  Musculoskeletal: Negative  Skin: Negative  Allergic/Immunologic: Negative  Neurological: Negative  Hematological: Negative  Psychiatric/Behavioral: Negative  Objective:      /72 (BP Location: Left arm, Patient Position: Sitting, Cuff Size: Adult)   Pulse 76   Temp 98 3 °F (36 8 °C)   Resp 18   Ht 5' 6" (1 676 m)   Wt 93 kg (205 lb)   BMI 33 09 kg/m²          Physical Exam  Constitutional:       Appearance: He is obese  HENT:      Head: Normocephalic and atraumatic  Nose: Nose normal    Eyes:      Pupils: Pupils are equal, round, and reactive to light  Cardiovascular:      Rate and Rhythm: Normal rate and regular rhythm  Pulses: Normal pulses  Heart sounds: Normal heart sounds  Pulmonary:      Effort: Pulmonary effort is normal       Breath sounds: Normal breath sounds  Abdominal:      General: Abdomen is flat  There is distension  Palpations: Abdomen is soft  There is no mass  Tenderness:  There is no abdominal tenderness  Hernia: A hernia (Small reducible umbilical hernia) is present  Musculoskeletal:         General: Normal range of motion  Cervical back: Normal range of motion  Skin:     General: Skin is warm and dry  Neurological:      General: No focal deficit present  Mental Status: He is alert and oriented to person, place, and time     Psychiatric:         Mood and Affect: Mood normal          Behavior: Behavior normal

## 2021-06-28 NOTE — PROGRESS NOTES
Assessment/Plan:    No problem-specific Assessment & Plan notes found for this encounter  {Assess/PlanSmartLinks:39056}      Subjective:      Patient ID: Jarett Chappell is a 64 y o  male      HPI    {Common ambulatory SmartLinks:90471}    Review of Systems      Objective:      /72 (BP Location: Left arm, Patient Position: Sitting, Cuff Size: Adult)   Pulse 76   Temp 98 3 °F (36 8 °C)   Resp 18   Ht 5' 6" (1 676 m)   Wt 93 kg (205 lb)   BMI 33 09 kg/m²          Physical Exam

## 2021-06-28 NOTE — H&P
Assessment/Plan:  Patient has an umbilical hernia  I explained the procedure to him  This the procedure will be done at [address] [place] [address],[address] on July 8  Complications including but not limited to infection, bleeding, recurrence were explained  He signed the consent  No problem-specific Assessment & Plan notes found for this encounter  Diagnoses and all orders for this visit:    Umbilical hernia without obstruction and without gangrene  -     Ambulatory referral to General Surgery    Encounter for preadmission testing  -     CBC and differential; Future  -     Comprehensive metabolic panel; Future  -     ECG 12 lead; Future          Subjective:      Patient ID: Rafaela Boss is a 64 y o  male  60-year-old male patient came to my office with complaints of abdominal wall swelling for last 2 months  The swelling was diagnosed as umbilical hernia when he visited his primary care physician who referred him to see me  No complaints of nausea vomiting  No complaints of pain in the swelling  No complaints of constipation diarrhea  Patient recently had a TURBT  Patient takes aspirin  He has history of diabetes and his last hemoglobin A1c was 6 4  The following portions of the patient's history were reviewed and updated as appropriate:   He  has a past medical history of Carcinoid tumor of colon, Colon polyp, CPAP (continuous positive airway pressure) dependence, Cyst of skin and subcutaneous tissue, Diabetes mellitus ([address] ), Hyperlipidemia, Hypertension, Myasthenia gravis ([facility] [address] ), and Sleep apnea    He   Patient Active Problem List    Diagnosis Date Noted    Umbilical hernia without obstruction and without gangrene 06/22/2021    Annual physical exam 06/17/2021    Class 1 obesity due to excess calories with serious comorbidity and body mass index (BMI) of 34 0 to 34 9 in adult 06/17/2021    Pain in both lower extremities 02/22/2021    Hypertensive heart disease without heart failure 01/29/2021    Cystitis glandularis 07/16/2020    Essential hypertension 06/16/2020    Diabetes mellitus without complication ([place] [address] ) 86/27/4903    Dyslipidemia 06/16/2020    JERO (obstructive sleep apnea) 06/16/2020    Myasthenia gravis ([facility] ) 06/16/2020    History of malignant carcinoid tumor of rectum 06/16/2020    Bladder wall thickening 05/07/2020     He  has a past surgical history that includes Dental surgery; Nasal polyp excision; pr colonoscopy flx dx w/collj spec when pfrmd (N/A, 7/19/2016); Back surgery (01/01/2001); Colonoscopy; [place] retrograde pyelogram (6/25/2020); pr cystourethroscopy,fulgur <0 5 cm lesn (N/A, 6/25/2020); and pr cystourethroscopy,ureter catheter (Bilateral, 6/25/2020)  His family history includes Diabetes in his mother; Heart disease in his father; Heart failure in his father; No Known Problems in his sister; Prostate cancer in his father  He  reports that he has never smoked  He has never used smokeless tobacco  He reports current alcohol use of about 1 0 standard drinks of alcohol per week  He reports that he does not use drugs  Current Outpatient Medications   Medication Sig Dispense Refill    Accu-Chek Danielle Plus test strip TEST BLOOD SUGARS ONCE DAILY      amLODIPine (NORVASC) 10 mg tablet TAKE 1 TABLET EVERY DAY 90 tablet 2    Ascorbic Acid (VITAMIN C) 500 MG CAPS Take 1 tablet by mouth daily      aspirin 81 MG tablet Take 81 mg by mouth daily        atorvastatin (LIPITOR) 40 mg tablet Take 40 mg by mouth daily at bedtime  4    B-D UF III MINI PEN NEEDLES 31G X 5 MM MISC USE 1 DAILY  5    Basaglar KwikPen 100 units/mL injection pen INJECT UP  UNITS DAILY AS DIRECTED      BD Pen Needle Nelsy U/F 32G X 4 MM MISC 2 (two) times a day Test      Cholecalciferol (CVS VITAMIN D) 2000 units CAPS Take 2 tablets by mouth 2 (two) times a day      Cinnamon 500 MG TABS Take by mouth      coenzyme Q-10 100 MG capsule Take 2 capsules by mouth daily      glipiZIDE (GLUCOTROL XL) 10 mg 24 hr tablet Take 5 mg by mouth daily       JANUVIA 100 MG tablet Take 100 mg by mouth daily  2    Jardiance 10 MG TABS       metFORMIN (GLUCOPHAGE-XR) 500 mg 24 hr tablet Take 1,000 mg by mouth 2 (two) times a day      metoprolol succinate (TOPROL-XL) 100 mg 24 hr tablet TAKE 1 TABLET BY MOUTH DAILY 90 tablet 3    Multiple Vitamin (MULTI-VITAMIN DAILY) TABS Take 1 tablet by mouth daily      NovoLOG FlexPen 100 units/mL injection pen INJECT 8 UNITS SUBCUTANEOUSLY BEFORE DINNER      olmesartan (BENICAR) 40 mg tablet Take 1 tablet (40 mg total) by mouth daily 90 tablet 2    pyridostigmine (MESTINON) 60 mg tablet Take 60 mg by mouth 3 (three) times a day   triamterene-hydrochlorothiazide (MAXZIDE-25) 37 5-25 mg per tablet Take 1 tablet by mouth daily 90 tablet 3    VASCEPA 1 g CAPS TAKE 1 CAPSULE BY MOUTH TWICE A DAY WITH MEALS  2     No current facility-administered medications for this visit  Current Outpatient Medications on File Prior to Visit   Medication Sig    Accu-Chek Danielle Plus test strip TEST BLOOD SUGARS ONCE DAILY    amLODIPine (NORVASC) 10 mg tablet TAKE 1 TABLET EVERY DAY    Ascorbic Acid (VITAMIN C) 500 MG CAPS Take 1 tablet by mouth daily    aspirin 81 MG tablet Take 81 mg by mouth daily      atorvastatin (LIPITOR) 40 mg tablet Take 40 mg by mouth daily at bedtime    B-D UF III MINI PEN NEEDLES 31G X 5 MM MISC USE 1 DAILY    Basaglar KwikPen 100 units/mL injection pen INJECT UP  UNITS DAILY AS DIRECTED    BD Pen Needle Nelsy U/F 32G X 4 MM MISC 2 (two) times a day Test    Cholecalciferol (CVS VITAMIN D) 2000 units CAPS Take 2 tablets by mouth 2 (two) times a day    Cinnamon 500 MG TABS Take by mouth    coenzyme Q-10 100 MG capsule Take 2 capsules by mouth daily    glipiZIDE (GLUCOTROL XL) 10 mg 24 hr tablet Take 5 mg by mouth daily     JANUVIA 100 MG tablet Take 100 mg by mouth daily    Jardiance 10 MG TABS     metFORMIN (GLUCOPHAGE-XR) 500 mg 24 hr tablet Take 1,000 mg by mouth 2 (two) times a day    metoprolol succinate (TOPROL-XL) 100 mg 24 hr tablet TAKE 1 TABLET BY MOUTH DAILY    Multiple Vitamin (MULTI-VITAMIN DAILY) TABS Take 1 tablet by mouth daily    NovoLOG FlexPen 100 units/mL injection pen INJECT 8 UNITS SUBCUTANEOUSLY BEFORE DINNER    olmesartan (BENICAR) 40 mg tablet Take 1 tablet (40 mg total) by mouth daily    pyridostigmine (MESTINON) 60 mg tablet Take 60 mg by mouth 3 (three) times a day   triamterene-hydrochlorothiazide (MAXZIDE-25) 37 5-25 mg per tablet Take 1 tablet by mouth daily    VASCEPA 1 g CAPS TAKE 1 CAPSULE BY MOUTH TWICE A DAY WITH MEALS     No current facility-administered medications on file prior to visit  He has No Known Allergies       Review of Systems   Constitutional: Negative  HENT: Negative  Eyes: Negative  Respiratory: Negative  Cardiovascular: Negative  Gastrointestinal: Negative  Endocrine: Negative  Genitourinary: Negative  Musculoskeletal: Negative  Skin: Negative  Allergic/Immunologic: Negative  Neurological: Negative  Hematological: Negative  Psychiatric/Behavioral: Negative  Objective:      /72 (BP Location: Left arm, Patient Position: Sitting, Cuff Size: Adult)   Pulse 76   Temp 98 3 °F (36 8 °C)   Resp 18   Ht 5' 6" (1 676 m)   Wt 93 kg (205 lb)   BMI 33 09 kg/m²          Physical Exam  Constitutional:       Appearance: He is obese  HENT:      Head: Normocephalic and atraumatic  Nose: Nose normal    Eyes:      Pupils: Pupils are equal, round, and reactive to light  Cardiovascular:      Rate and Rhythm: Normal rate and regular rhythm  Pulses: Normal pulses  Heart sounds: Normal heart sounds  Pulmonary:      Effort: Pulmonary effort is normal       Breath sounds: Normal breath sounds  Abdominal:      General: Abdomen is flat  There is distension  Palpations: Abdomen is soft  There is no mass  Tenderness:  There is no abdominal tenderness  Hernia: A hernia (Small reducible umbilical hernia) is present  Musculoskeletal:         General: Normal range of motion  Cervical back: Normal range of motion  Skin:     General: Skin is warm and dry  Neurological:      General: No focal deficit present  Mental Status: He is alert and oriented to person, place, and time     Psychiatric:         Mood and Affect: Mood normal          Behavior: Behavior normal

## 2021-06-28 NOTE — PROGRESS NOTES
Assessment/Plan:  Patient has an umbilical hernia  I explained the procedure to him  This the procedure will be done at [facility] on July 8  Complications including but not limited to infection, bleeding, recurrence were explained  He signed the consent  No problem-specific Assessment & Plan notes found for this encounter  Diagnoses and all orders for this visit:    Umbilical hernia without obstruction and without gangrene  -     Ambulatory referral to General Surgery    Encounter for preadmission testing  -     CBC and differential; Future  -     Comprehensive metabolic panel; Future  -     ECG 12 lead; Future          Subjective:      Patient ID: Seema March is a 64 y o  male  28-year-old male patient came to my office with complaints of abdominal wall swelling for last 2 months  The swelling was diagnosed as umbilical hernia when he visited his primary care physician who referred him to see me  No complaints of nausea vomiting  No complaints of pain in the swelling  No complaints of constipation diarrhea  Patient recently had a TURBT  Patient takes aspirin  He has history of diabetes and his last hemoglobin A1c was 6 4  The following portions of the patient's history were reviewed and updated as appropriate:   He  has a past medical history of Carcinoid tumor of colon, Colon polyp, CPAP (continuous positive airway pressure) dependence, Cyst of skin and subcutaneous tissue, Diabetes mellitus ([address] ), Hyperlipidemia, Hypertension, Myasthenia gravis ([address] ), and Sleep apnea    He   Patient Active Problem List    Diagnosis Date Noted    Umbilical hernia without obstruction and without gangrene 06/22/2021    Annual physical exam 06/17/2021    Class 1 obesity due to excess calories with serious comorbidity and body mass index (BMI) of 34 0 to 34 9 in adult 06/17/2021    Pain in both lower extremities 02/22/2021    Hypertensive heart disease without heart failure 01/29/2021    Cystitis glandularis 07/16/2020    Essential hypertension 06/16/2020    Diabetes mellitus without complication ([place] [address] ) 45/97/3297    Dyslipidemia 06/16/2020    JERO (obstructive sleep apnea) 06/16/2020    Myasthenia gravis ([facility] ) 06/16/2020    History of malignant carcinoid tumor of rectum 06/16/2020    Bladder wall thickening 05/07/2020     He  has a past surgical history that includes Dental surgery; Nasal polyp excision; pr colonoscopy flx dx w/collj spec when pfrmd (N/A, 7/19/2016); Back surgery (01/01/2001); Colonoscopy; [place] retrograde pyelogram (6/25/2020); pr cystourethroscopy,fulgur <0 5 cm lesn (N/A, 6/25/2020); and pr cystourethroscopy,ureter catheter (Bilateral, 6/25/2020)  His family history includes Diabetes in his mother; Heart disease in his father; Heart failure in his father; No Known Problems in his sister; Prostate cancer in his father  He  reports that he has never smoked  He has never used smokeless tobacco  He reports current alcohol use of about 1 0 standard drinks of alcohol per week  He reports that he does not use drugs  Current Outpatient Medications   Medication Sig Dispense Refill    Accu-Chek Danielle Plus test strip TEST BLOOD SUGARS ONCE DAILY      amLODIPine (NORVASC) 10 mg tablet TAKE 1 TABLET EVERY DAY 90 tablet 2    Ascorbic Acid (VITAMIN C) 500 MG CAPS Take 1 tablet by mouth daily      aspirin 81 MG tablet Take 81 mg by mouth daily        atorvastatin (LIPITOR) 40 mg tablet Take 40 mg by mouth daily at bedtime  4    B-D UF III MINI PEN NEEDLES 31G X 5 MM MISC USE 1 DAILY  5    Basaglar KwikPen 100 units/mL injection pen INJECT UP  UNITS DAILY AS DIRECTED      BD Pen Needle Nelsy U/F 32G X 4 MM MISC 2 (two) times a day Test      Cholecalciferol (CVS VITAMIN D) 2000 units CAPS Take 2 tablets by mouth 2 (two) times a day      Cinnamon 500 MG TABS Take by mouth      coenzyme Q-10 100 MG capsule Take 2 capsules by mouth daily      glipiZIDE (GLUCOTROL XL) 10 mg 24 hr tablet Take 5 mg by mouth daily       JANUVIA 100 MG tablet Take 100 mg by mouth daily  2    Jardiance 10 MG TABS       metFORMIN (GLUCOPHAGE-XR) 500 mg 24 hr tablet Take 1,000 mg by mouth 2 (two) times a day      metoprolol succinate (TOPROL-XL) 100 mg 24 hr tablet TAKE 1 TABLET BY MOUTH DAILY 90 tablet 3    Multiple Vitamin (MULTI-VITAMIN DAILY) TABS Take 1 tablet by mouth daily      NovoLOG FlexPen 100 units/mL injection pen INJECT 8 UNITS SUBCUTANEOUSLY BEFORE DINNER      olmesartan (BENICAR) 40 mg tablet Take 1 tablet (40 mg total) by mouth daily 90 tablet 2    pyridostigmine (MESTINON) 60 mg tablet Take 60 mg by mouth 3 (three) times a day   triamterene-hydrochlorothiazide (MAXZIDE-25) 37 5-25 mg per tablet Take 1 tablet by mouth daily 90 tablet 3    VASCEPA 1 g CAPS TAKE 1 CAPSULE BY MOUTH TWICE A DAY WITH MEALS  2     No current facility-administered medications for this visit  Current Outpatient Medications on File Prior to Visit   Medication Sig    Accu-Chek Danielle Plus test strip TEST BLOOD SUGARS ONCE DAILY    amLODIPine (NORVASC) 10 mg tablet TAKE 1 TABLET EVERY DAY    Ascorbic Acid (VITAMIN C) 500 MG CAPS Take 1 tablet by mouth daily    aspirin 81 MG tablet Take 81 mg by mouth daily      atorvastatin (LIPITOR) 40 mg tablet Take 40 mg by mouth daily at bedtime    B-D UF III MINI PEN NEEDLES 31G X 5 MM MISC USE 1 DAILY    Basaglar KwikPen 100 units/mL injection pen INJECT UP  UNITS DAILY AS DIRECTED    BD Pen Needle Nelsy U/F 32G X 4 MM MISC 2 (two) times a day Test    Cholecalciferol (CVS VITAMIN D) 2000 units CAPS Take 2 tablets by mouth 2 (two) times a day    Cinnamon 500 MG TABS Take by mouth    coenzyme Q-10 100 MG capsule Take 2 capsules by mouth daily    glipiZIDE (GLUCOTROL XL) 10 mg 24 hr tablet Take 5 mg by mouth daily     JANUVIA 100 MG tablet Take 100 mg by mouth daily    Jardiance 10 MG TABS     metFORMIN (GLUCOPHAGE-XR) 500 mg 24 hr tablet Take 1,000 mg by mouth 2 (two) times a day    metoprolol succinate (TOPROL-XL) 100 mg 24 hr tablet TAKE 1 TABLET BY MOUTH DAILY    Multiple Vitamin (MULTI-VITAMIN DAILY) TABS Take 1 tablet by mouth daily    NovoLOG FlexPen 100 units/mL injection pen INJECT 8 UNITS SUBCUTANEOUSLY BEFORE DINNER    olmesartan (BENICAR) 40 mg tablet Take 1 tablet (40 mg total) by mouth daily    pyridostigmine (MESTINON) 60 mg tablet Take 60 mg by mouth 3 (three) times a day   triamterene-hydrochlorothiazide (MAXZIDE-25) 37 5-25 mg per tablet Take 1 tablet by mouth daily    VASCEPA 1 g CAPS TAKE 1 CAPSULE BY MOUTH TWICE A DAY WITH MEALS     No current facility-administered medications on file prior to visit  He has No Known Allergies       Review of Systems   Constitutional: Negative  HENT: Negative  Eyes: Negative  Respiratory: Negative  Cardiovascular: Negative  Gastrointestinal: Negative  Endocrine: Negative  Genitourinary: Negative  Musculoskeletal: Negative  Skin: Negative  Allergic/Immunologic: Negative  Neurological: Negative  Hematological: Negative  Psychiatric/Behavioral: Negative  Objective:      /72 (BP Location: Left arm, Patient Position: Sitting, Cuff Size: Adult)   Pulse 76   Temp 98 3 °F (36 8 °C)   Resp 18   Ht 5' 6" (1 676 m)   Wt 93 kg (205 lb)   BMI 33 09 kg/m²          Physical Exam  Constitutional:       Appearance: He is obese  HENT:      Head: Normocephalic and atraumatic  Nose: Nose normal    Eyes:      Pupils: Pupils are equal, round, and reactive to light  Cardiovascular:      Rate and Rhythm: Normal rate and regular rhythm  Pulses: Normal pulses  Heart sounds: Normal heart sounds  Pulmonary:      Effort: Pulmonary effort is normal       Breath sounds: Normal breath sounds  Abdominal:      General: Abdomen is flat  There is distension  Palpations: Abdomen is soft  There is no mass  Tenderness:  There is no abdominal tenderness  Hernia: A hernia (Small reducible umbilical hernia) is present  Musculoskeletal:         General: Normal range of motion  Cervical back: Normal range of motion  Skin:     General: Skin is warm and dry  Neurological:      General: No focal deficit present  Mental Status: He is alert and oriented to person, place, and time     Psychiatric:         Mood and Affect: Mood normal          Behavior: Behavior normal

## 2021-06-30 ENCOUNTER — TELEPHONE (OUTPATIENT)
Dept: SURGERY | Facility: CLINIC | Age: 61
End: 2021-06-30

## 2021-06-30 ENCOUNTER — APPOINTMENT (OUTPATIENT)
Dept: LAB | Facility: AMBULARY SURGERY CENTER | Age: 61
End: 2021-06-30
Payer: COMMERCIAL

## 2021-06-30 ENCOUNTER — OFFICE VISIT (OUTPATIENT)
Dept: LAB | Facility: CLINIC | Age: 61
End: 2021-06-30
Payer: COMMERCIAL

## 2021-06-30 DIAGNOSIS — Z01.818 ENCOUNTER FOR PREADMISSION TESTING: ICD-10-CM

## 2021-06-30 DIAGNOSIS — Z11.4 SCREENING FOR HIV (HUMAN IMMUNODEFICIENCY VIRUS): ICD-10-CM

## 2021-06-30 DIAGNOSIS — Z11.59 NEED FOR HEPATITIS C SCREENING TEST: ICD-10-CM

## 2021-06-30 LAB
ALBUMIN SERPL BCP-MCNC: 4 G/DL (ref 3.5–5)
ALP SERPL-CCNC: 60 U/L (ref 46–116)
ALT SERPL W P-5'-P-CCNC: 26 U/L (ref 12–78)
ANION GAP SERPL CALCULATED.3IONS-SCNC: 3 MMOL/L (ref 4–13)
AST SERPL W P-5'-P-CCNC: 17 U/L (ref 5–45)
ATRIAL RATE: 74 BPM
BASOPHILS # BLD AUTO: 0.05 THOUSANDS/ΜL (ref 0–0.1)
BASOPHILS NFR BLD AUTO: 1 % (ref 0–1)
BILIRUB SERPL-MCNC: 0.85 MG/DL (ref 0.2–1)
BUN SERPL-MCNC: 11 MG/DL (ref 5–25)
CALCIUM SERPL-MCNC: 9.2 MG/DL (ref 8.3–10.1)
CHLORIDE SERPL-SCNC: 106 MMOL/L (ref 100–108)
CO2 SERPL-SCNC: 31 MMOL/L (ref 21–32)
CREAT SERPL-MCNC: 1.08 MG/DL (ref 0.6–1.3)
EOSINOPHIL # BLD AUTO: 0.17 THOUSAND/ΜL (ref 0–0.61)
EOSINOPHIL NFR BLD AUTO: 4 % (ref 0–6)
ERYTHROCYTE [DISTWIDTH] IN BLOOD BY AUTOMATED COUNT: 13.4 % (ref 11.6–15.1)
GFR SERPL CREATININE-BSD FRML MDRD: 85 ML/MIN/1.73SQ M
GLUCOSE P FAST SERPL-MCNC: 82 MG/DL (ref 65–99)
HCT VFR BLD AUTO: 47.7 % (ref 36.5–49.3)
HCV AB SER QL: NORMAL
HGB BLD-MCNC: 14.7 G/DL (ref 12–17)
IMM GRANULOCYTES # BLD AUTO: 0.01 THOUSAND/UL (ref 0–0.2)
IMM GRANULOCYTES NFR BLD AUTO: 0 % (ref 0–2)
LYMPHOCYTES # BLD AUTO: 1.06 THOUSANDS/ΜL (ref 0.6–4.47)
LYMPHOCYTES NFR BLD AUTO: 23 % (ref 14–44)
MCH RBC QN AUTO: 26.8 PG (ref 26.8–34.3)
MCHC RBC AUTO-ENTMCNC: 30.8 G/DL (ref 31.4–37.4)
MCV RBC AUTO: 87 FL (ref 82–98)
MONOCYTES # BLD AUTO: 0.69 THOUSAND/ΜL (ref 0.17–1.22)
MONOCYTES NFR BLD AUTO: 15 % (ref 4–12)
NEUTROPHILS # BLD AUTO: 2.57 THOUSANDS/ΜL (ref 1.85–7.62)
NEUTS SEG NFR BLD AUTO: 57 % (ref 43–75)
NRBC BLD AUTO-RTO: 0 /100 WBCS
P AXIS: 62 DEGREES
PLATELET # BLD AUTO: 235 THOUSANDS/UL (ref 149–390)
PMV BLD AUTO: 10.6 FL (ref 8.9–12.7)
POTASSIUM SERPL-SCNC: 3.7 MMOL/L (ref 3.5–5.3)
PR INTERVAL: 154 MS
PROT SERPL-MCNC: 7.8 G/DL (ref 6.4–8.2)
QRS AXIS: 46 DEGREES
QRSD INTERVAL: 94 MS
QT INTERVAL: 364 MS
QTC INTERVAL: 404 MS
RBC # BLD AUTO: 5.49 MILLION/UL (ref 3.88–5.62)
SODIUM SERPL-SCNC: 140 MMOL/L (ref 136–145)
T WAVE AXIS: 52 DEGREES
VENTRICULAR RATE: 74 BPM
WBC # BLD AUTO: 4.55 THOUSAND/UL (ref 4.31–10.16)

## 2021-06-30 PROCEDURE — 85025 COMPLETE CBC W/AUTO DIFF WBC: CPT

## 2021-06-30 PROCEDURE — 36415 COLL VENOUS BLD VENIPUNCTURE: CPT

## 2021-06-30 PROCEDURE — 80053 COMPREHEN METABOLIC PANEL: CPT

## 2021-06-30 PROCEDURE — 93010 ELECTROCARDIOGRAM REPORT: CPT | Performed by: INTERNAL MEDICINE

## 2021-06-30 PROCEDURE — 93005 ELECTROCARDIOGRAM TRACING: CPT

## 2021-06-30 PROCEDURE — 87389 HIV-1 AG W/HIV-1&-2 AB AG IA: CPT

## 2021-06-30 PROCEDURE — 86803 HEPATITIS C AB TEST: CPT

## 2021-06-30 NOTE — TELEPHONE ENCOUNTER
Post visit call from 6/28 with Dr Kris Campbell  Patient said he was pleased with the doctor and staff  He said all of his questions were answered  We discussed his upcoming procedure and work  He works from home and is hoping he will be able to do that and not miss anymore time than needed

## 2021-07-01 LAB — HIV 1+2 AB+HIV1 P24 AG SERPL QL IA: NORMAL

## 2021-07-01 RX ORDER — LORATADINE 10 MG/1
10 TABLET ORAL DAILY
COMMUNITY

## 2021-07-01 RX ORDER — THIAMINE HCL 100 MG
TABLET ORAL
COMMUNITY

## 2021-07-01 RX ORDER — ZINC GLUCONATE 50 MG
50 TABLET ORAL DAILY
COMMUNITY

## 2021-07-01 RX ORDER — PNV NO.95/FERROUS FUM/FOLIC AC 28MG-0.8MG
TABLET ORAL 2 TIMES DAILY
COMMUNITY

## 2021-07-01 NOTE — PRE-PROCEDURE INSTRUCTIONS
Pre-Surgery Instructions:   Medication Instructions    amLODIPine (NORVASC) 10 mg tablet Instructed patient per Anesthesia Guidelines  take am of sx    Ascorbic Acid (VITAMIN C) 500 MG CAPS Instructed patient per Anesthesia Guidelines  hold am of sx    atorvastatin (LIPITOR) 40 mg tablet Instructed patient per Anesthesia Guidelines  may take am of     Basaglar KwikPen 100 units/mL injection pen Instructed patient per Anesthesia Guidelines  take long acting insulin am of sx    Cholecalciferol (CVS VITAMIN D) 2000 units CAPS Instructed patient per Anesthesia Guidelines  hold am of sx    Cinnamon 500 MG TABS Instructed patient per Anesthesia Guidelines  stopping preop    coenzyme Q-10 100 MG capsule Instructed patient per Anesthesia Guidelines  hold am of sx    Ferrous Sulfate (Iron) 325 (65 Fe) MG TABS Instructed patient per Anesthesia Guidelines  hold am of sx    glipiZIDE (GLUCOTROL XL) 10 mg 24 hr tablet Instructed patient per Anesthesia Guidelines  hold am of sx    JANUVIA 100 MG tablet Instructed patient per Anesthesia Guidelines  hold am of sx    Jardiance 10 MG TABS Instructed patient per Anesthesia Guidelines  hold am of sx    metFORMIN (GLUCOPHAGE-XR) 500 mg 24 hr tablet Instructed patient per Anesthesia Guidelines  hold am of sx    metoprolol succinate (TOPROL-XL) 100 mg 24 hr tablet Instructed patient per Anesthesia Guidelines  take am of sx    Multiple Vitamin (MULTI-VITAMIN DAILY) TABS Instructed patient per Anesthesia Guidelines  holding preop    NovoLOG FlexPen 100 units/mL injection pen Instructed patient per Anesthesia Guidelines  takes in pm    olmesartan (BENICAR) 40 mg tablet Instructed patient per Anesthesia Guidelines  hold am of sx    pyridostigmine (MESTINON) 60 mg tablet Instructed patient per Anesthesia Guidelines  take am of sx    triamterene-hydrochlorothiazide (MAXZIDE-25) 37 5-25 mg per tablet Instructed patient per Anesthesia Guidelines  hold am of sx    VASCEPA 1 g CAPS Instructed patient per Anesthesia Guidelines  may take am of sx    You will receive a phone call from hospital for arrival time  Please call surgeons office if any changes in your condition  Wear easy on/off clothing; consider type of surgery;  Valuables, jewelry, piercing's please keep at home  **COVID-19  education/surgical guidelines      Please: No contact lenses or eye make up, artificial eyelashes    Please secure transportation     Follow pre surgery showering or cleaning instructions as  Reviewed by nurse or surgeons office      Questions answered and concerns addressed

## 2021-07-07 ENCOUNTER — ANESTHESIA EVENT (OUTPATIENT)
Dept: PERIOP | Facility: HOSPITAL | Age: 61
End: 2021-07-07
Payer: COMMERCIAL

## 2021-07-08 ENCOUNTER — HOSPITAL ENCOUNTER (OUTPATIENT)
Facility: HOSPITAL | Age: 61
Setting detail: OUTPATIENT SURGERY
Discharge: HOME | End: 2021-07-08
Attending: SURGERY | Admitting: SURGERY
Payer: COMMERCIAL

## 2021-07-08 ENCOUNTER — ANESTHESIA (OUTPATIENT)
Dept: PERIOP | Facility: HOSPITAL | Age: 61
End: 2021-07-08
Payer: COMMERCIAL

## 2021-07-08 VITALS
SYSTOLIC BLOOD PRESSURE: 143 MMHG | HEART RATE: 74 BPM | TEMPERATURE: 97.2 F | OXYGEN SATURATION: 95 % | HEIGHT: 66 IN | WEIGHT: 201 LBS | BODY MASS INDEX: 32.3 KG/M2 | RESPIRATION RATE: 8 BRPM | DIASTOLIC BLOOD PRESSURE: 73 MMHG

## 2021-07-08 DIAGNOSIS — Z98.890 S/P HERNIA REPAIR: Primary | ICD-10-CM

## 2021-07-08 DIAGNOSIS — Z87.19 S/P HERNIA REPAIR: Primary | ICD-10-CM

## 2021-07-08 LAB
GLUCOSE SERPL-MCNC: 124 MG/DL (ref 65–140)
GLUCOSE SERPL-MCNC: 125 MG/DL (ref 65–140)

## 2021-07-08 PROCEDURE — C1781 MESH (IMPLANTABLE): HCPCS | Performed by: SURGERY

## 2021-07-08 PROCEDURE — NC001 PR NO CHARGE: Performed by: PHYSICIAN ASSISTANT

## 2021-07-08 PROCEDURE — 49585 PR REPAIR UMBILICAL HERN,5+Y/O,REDUC: CPT | Performed by: PHYSICIAN ASSISTANT

## 2021-07-08 PROCEDURE — 82948 REAGENT STRIP/BLOOD GLUCOSE: CPT

## 2021-07-08 PROCEDURE — 49585 PR REPAIR UMBILICAL HERN,5+Y/O,REDUC: CPT | Performed by: SURGERY

## 2021-07-08 DEVICE — VENTRALEX ST HERNIA PATCH
Type: IMPLANTABLE DEVICE | Site: ABDOMEN | Status: FUNCTIONAL
Brand: VENTRALEX ST HERNIA PATCH

## 2021-07-08 RX ORDER — ONDANSETRON 2 MG/ML
4 INJECTION INTRAMUSCULAR; INTRAVENOUS ONCE AS NEEDED
Status: DISCONTINUED | OUTPATIENT
Start: 2021-07-08 | End: 2021-07-08 | Stop reason: HOSPADM

## 2021-07-08 RX ORDER — MORPHINE SULFATE 4 MG/ML
2 INJECTION, SOLUTION INTRAMUSCULAR; INTRAVENOUS
Status: DISCONTINUED | OUTPATIENT
Start: 2021-07-08 | End: 2021-07-08 | Stop reason: HOSPADM

## 2021-07-08 RX ORDER — SODIUM CHLORIDE, SODIUM LACTATE, POTASSIUM CHLORIDE, CALCIUM CHLORIDE 600; 310; 30; 20 MG/100ML; MG/100ML; MG/100ML; MG/100ML
125 INJECTION, SOLUTION INTRAVENOUS CONTINUOUS
Status: DISCONTINUED | OUTPATIENT
Start: 2021-07-08 | End: 2021-07-08 | Stop reason: HOSPADM

## 2021-07-08 RX ORDER — OXYCODONE HYDROCHLORIDE 5 MG/1
5 TABLET ORAL EVERY 6 HOURS PRN
Qty: 6 TABLET | Refills: 0 | Status: SHIPPED | OUTPATIENT
Start: 2021-07-08 | End: 2021-07-13

## 2021-07-08 RX ORDER — PROPOFOL 10 MG/ML
INJECTION, EMULSION INTRAVENOUS AS NEEDED
Status: DISCONTINUED | OUTPATIENT
Start: 2021-07-08 | End: 2021-07-08

## 2021-07-08 RX ORDER — MIDAZOLAM HYDROCHLORIDE 2 MG/2ML
INJECTION, SOLUTION INTRAMUSCULAR; INTRAVENOUS AS NEEDED
Status: DISCONTINUED | OUTPATIENT
Start: 2021-07-08 | End: 2021-07-08

## 2021-07-08 RX ORDER — LIDOCAINE HYDROCHLORIDE 10 MG/ML
INJECTION, SOLUTION EPIDURAL; INFILTRATION; INTRACAUDAL; PERINEURAL AS NEEDED
Status: DISCONTINUED | OUTPATIENT
Start: 2021-07-08 | End: 2021-07-08

## 2021-07-08 RX ORDER — BUPIVACAINE HYDROCHLORIDE AND EPINEPHRINE 2.5; 5 MG/ML; UG/ML
INJECTION, SOLUTION INFILTRATION; PERINEURAL AS NEEDED
Status: DISCONTINUED | OUTPATIENT
Start: 2021-07-08 | End: 2021-07-08 | Stop reason: HOSPADM

## 2021-07-08 RX ORDER — DEXAMETHASONE SODIUM PHOSPHATE 4 MG/ML
INJECTION, SOLUTION INTRA-ARTICULAR; INTRALESIONAL; INTRAMUSCULAR; INTRAVENOUS; SOFT TISSUE AS NEEDED
Status: DISCONTINUED | OUTPATIENT
Start: 2021-07-08 | End: 2021-07-08

## 2021-07-08 RX ORDER — ONDANSETRON 2 MG/ML
INJECTION INTRAMUSCULAR; INTRAVENOUS AS NEEDED
Status: DISCONTINUED | OUTPATIENT
Start: 2021-07-08 | End: 2021-07-08

## 2021-07-08 RX ORDER — ROCURONIUM BROMIDE 10 MG/ML
INJECTION, SOLUTION INTRAVENOUS AS NEEDED
Status: DISCONTINUED | OUTPATIENT
Start: 2021-07-08 | End: 2021-07-08

## 2021-07-08 RX ORDER — FENTANYL CITRATE 50 UG/ML
INJECTION, SOLUTION INTRAMUSCULAR; INTRAVENOUS AS NEEDED
Status: DISCONTINUED | OUTPATIENT
Start: 2021-07-08 | End: 2021-07-08

## 2021-07-08 RX ORDER — FENTANYL CITRATE/PF 50 MCG/ML
25 SYRINGE (ML) INJECTION
Status: DISCONTINUED | OUTPATIENT
Start: 2021-07-08 | End: 2021-07-08 | Stop reason: HOSPADM

## 2021-07-08 RX ORDER — SODIUM CHLORIDE, SODIUM LACTATE, POTASSIUM CHLORIDE, CALCIUM CHLORIDE 600; 310; 30; 20 MG/100ML; MG/100ML; MG/100ML; MG/100ML
INJECTION, SOLUTION INTRAVENOUS CONTINUOUS PRN
Status: DISCONTINUED | OUTPATIENT
Start: 2021-07-08 | End: 2021-07-08

## 2021-07-08 RX ORDER — CEFAZOLIN SODIUM 2 G/50ML
2000 SOLUTION INTRAVENOUS ONCE
Status: COMPLETED | OUTPATIENT
Start: 2021-07-08 | End: 2021-07-08

## 2021-07-08 RX ADMIN — PROPOFOL 200 MG: 10 INJECTION, EMULSION INTRAVENOUS at 07:35

## 2021-07-08 RX ADMIN — FENTANYL CITRATE 50 MCG: 50 INJECTION, SOLUTION INTRAMUSCULAR; INTRAVENOUS at 07:35

## 2021-07-08 RX ADMIN — SUGAMMADEX 100 MG: 100 INJECTION, SOLUTION INTRAVENOUS at 08:21

## 2021-07-08 RX ADMIN — ONDANSETRON 4 MG: 2 INJECTION INTRAMUSCULAR; INTRAVENOUS at 08:08

## 2021-07-08 RX ADMIN — FENTANYL CITRATE 50 MCG: 50 INJECTION, SOLUTION INTRAMUSCULAR; INTRAVENOUS at 07:45

## 2021-07-08 RX ADMIN — DEXAMETHASONE SODIUM PHOSPHATE 4 MG: 4 INJECTION, SOLUTION INTRAMUSCULAR; INTRAVENOUS at 07:59

## 2021-07-08 RX ADMIN — SUGAMMADEX 200 MG: 100 INJECTION, SOLUTION INTRAVENOUS at 07:59

## 2021-07-08 RX ADMIN — SODIUM CHLORIDE, SODIUM LACTATE, POTASSIUM CHLORIDE, AND CALCIUM CHLORIDE: .6; .31; .03; .02 INJECTION, SOLUTION INTRAVENOUS at 07:31

## 2021-07-08 RX ADMIN — LIDOCAINE HYDROCHLORIDE 100 MG: 10 INJECTION, SOLUTION EPIDURAL; INFILTRATION; INTRACAUDAL; PERINEURAL at 07:35

## 2021-07-08 RX ADMIN — MIDAZOLAM HYDROCHLORIDE 1 MG: 1 INJECTION, SOLUTION INTRAMUSCULAR; INTRAVENOUS at 07:28

## 2021-07-08 RX ADMIN — ROCURONIUM BROMIDE 25 MG: 10 INJECTION, SOLUTION INTRAVENOUS at 07:36

## 2021-07-08 RX ADMIN — PROPOFOL 50 MG: 10 INJECTION, EMULSION INTRAVENOUS at 08:04

## 2021-07-08 RX ADMIN — CEFAZOLIN SODIUM 2000 MG: 2 SOLUTION INTRAVENOUS at 07:28

## 2021-07-08 NOTE — ANESTHESIA PREPROCEDURE EVALUATION
Procedure:  REPAIR HERNIA UMBILICAL (N/A Abdomen)    Relevant Problems   CARDIO   (+) Essential hypertension      MUSCULOSKELETAL   (+) Myasthenia gravis (HCC)      NEURO/PSYCH   (+) History of malignant carcinoid tumor of rectum      PULMONARY   (+) JERO (obstructive sleep apnea)        Physical Exam    Airway       Dental       Cardiovascular  Rhythm: regular, Rate: normal, Cardiovascular exam normal    Pulmonary  Pulmonary exam normal Breath sounds clear to auscultation,     Other Findings        Anesthesia Plan  ASA Score- 3     Anesthesia Type- IV sedation with anesthesia with ASA Monitors  Additional Monitors:   Airway Plan: ETT  Plan Factors-Exercise tolerance (METS): >4 METS  Chart reviewed  EKG reviewed  Existing labs reviewed  Patient is not a current smoker  Patient instructed to abstain from smoking on day of procedure  Patient did not smoke on day of surgery  There is medical exclusion for perioperative obstructive sleep apnea risk education  Induction- intravenous  Postoperative Plan- Plan for postoperative opioid use  Planned trial extubation    Informed Consent- Anesthetic plan and risks discussed with patient  I personally reviewed this patient with the CRNA  Discussed and agreed on the Anesthesia Plan with the CRNA  Remi Peña

## 2021-07-08 NOTE — DISCHARGE INSTRUCTIONS
Postoperative Care Instructions      1  General: You may feel pulling sensations around the wound or funny aches and pains around the incisions  This is normal  Even minor surgery is a change in your body and this is your body's reaction to it  If you have had abdominal surgery, it may help to support the incision with a small pillow or blanket for comfort when moving or coughing  2  Wound care: The glue over the incisions will fall off over the next week or two     3  Showering: You may shower on 7/9  Please do not soak wound in standing water such as a bath, hot tub, pool, lake, etc  Do not scrub or use exfoliants on the surgical wounds  4  Activity: You may go up and down stairs, walk as much as you are comfortable, but walk at least 3 times each day  If you have had abdominal surgery, do not perform any strenuous exercise or lift anything heavier than 15 pounds for at least 4 weeks, unless cleared by your physician  5  Diet: You may resume your regular diet  Please drink lots of water  6  Medications: Resume all of your previous medications, unless told otherwise by the doctor  A good option for pain control is to start with acetaminophen(Tylenol) 650mg and ibuprofen(Advil) 400mg and alternate taking them every 3 hours  If this is not sufficient then you make take the narcotic pain medicine as prescribed  You do not need to take the narcotic pain medication unless you are having significant pain and discomfort  Please take the narcotic medication with food  Insure that you do not take more than 4000 mg of Tylenol per day  7  Driving: You will need someone to drive you home on the day of surgery  Do not drive or make any important decisions while on narcotic pain medication  Generally, you may drive 48 hours after you've stopped taking all narcotic pain medications  8  Upset Stomach: You may take Maalox, Tums, or similar items for an upset stomach   If your narcotic pain medication causes an upset stomach, do not take it on an empty stomach  Try taking it with at least some crackers or toast      9  Constipation: Patients often experience constipation after surgery  We recommend starting an over-the-counter medication for this, such as Metamucil, Senokot, Colace, milk of magnesia, etc  You may stop taking these medications a couple days after your last dose of narcotic medication  If you experience significant nausea or vomiting after abdominal surgery, call the office before trying any of these medications  10  Call the office: If you are experiencing any of the following: fevers above 101 5°, significant nausea or vomiting, if the wound develops drainage and/or excessive redness around the wound, or if you have significant diarrhea or other worsening symptoms      11  Pain: A prescription for narcotic pain medication will be sent to your pharmacy upon discharge from the hospital     12  Follow up: July 19

## 2021-07-08 NOTE — INTERVAL H&P NOTE
H&P reviewed  After examining the patient I find no changes in the patients condition since the H&P had been written      Vitals:    07/08/21 0720   BP: 145/71   Pulse: 69   Resp: 18   Temp: 98 6 °F (37 °C)   SpO2: 97%

## 2021-07-08 NOTE — ANESTHESIA POSTPROCEDURE EVALUATION
Post-Op Assessment Note    CV Status:  Stable  Pain Score: 0    Pain management: adequate     Mental Status:  Awake and alert   Hydration Status:  Stable   PONV Controlled:  None   Airway Patency:  Patent      Post Op Vitals Reviewed: Yes      Staff: CRNA, Anesthesiologist         No complications documented      BP  120/64    Temp 97 2   Pulse 70   Resp 17   SpO2 98

## 2021-07-08 NOTE — OP NOTE
OPERATIVE REPORT  PATIENT NAME: Chente Ngo    :  1960  MRN: [id number]  Pt Location: [facility] OR ROOM 01    SURGERY DATE: 2021    Surgeon(s) and Role: * Rosa Loving MD - Primary     * Charlee Bush PA-C - Assisting    Preop Diagnosis:  Umbilical hernia [F58 6]    Post-Op Diagnosis Codes:     * Umbilical hernia [P49 1]    Procedure:    Repair of umbilical hernia with mesh  Specimen(s):  * No specimens in log *    Estimated Blood Loss:   Minimal    Drains:  * No LDAs found *    Anesthesia Type:   General    Operative Indications:  Umbilical hernia [M52 5]      Operative Findings:  2 5 cm defect with the omentum and preperitoneal fat  Reducible  Complications:   None    Procedure and Technique:  The patient was brought to the operating room and was identified correctly by the team   General anesthesia was given by anesthesia team   Parts were prepped and draped in a standard fashion  Preoperative antibiotics had been given by then  A time-out was then performed  Infraumbilical curvilinear incision was made through the skin and was deepened up to the fascia  The hernia sac was dissected from the umbilical stalk using electrocautery  Hernia sac was opened the contents were reduced  The edges of the hernia sac was cleared  A 4 3 cm diameter Ventralex ST mesh was brought and introduced  It was sutured in place in an underlay fashion  The fascia over the mesh was closed using 0 Ethibond sutures in an interrupted fashion  Irrigation was performed  The umbilical stalk was then tacked to the fascia using 3-0 Vicryl  Subcutaneous tissue was closed in interrupted fashion with 2-0 Vicryl  The skin was closed in a subcuticular fashion with 4-0 Monocryl  Exofin was applied  Patient was recovered from anesthesia and was taken to the recovery under stable condition     I was present for the entire procedure, A qualified resident physician was not available and A physician assistant was required during the procedure for retraction tissue handling,dissection and suturing    Patient Disposition:  PACU     SIGNATURE: Radha Joyce MD  DATE: July 8, 2021  TIME: 8:04 AM

## 2021-07-19 ENCOUNTER — TELEPHONE (OUTPATIENT)
Dept: UROLOGY | Facility: AMBULATORY SURGERY CENTER | Age: 61
End: 2021-07-19

## 2021-07-19 ENCOUNTER — OFFICE VISIT (OUTPATIENT)
Dept: SURGERY | Facility: CLINIC | Age: 61
End: 2021-07-19

## 2021-07-19 VITALS
HEIGHT: 66 IN | HEART RATE: 83 BPM | WEIGHT: 205 LBS | SYSTOLIC BLOOD PRESSURE: 138 MMHG | RESPIRATION RATE: 18 BRPM | BODY MASS INDEX: 32.95 KG/M2 | DIASTOLIC BLOOD PRESSURE: 80 MMHG | TEMPERATURE: 98.4 F

## 2021-07-19 DIAGNOSIS — K42.9 UMBILICAL HERNIA WITHOUT OBSTRUCTION AND WITHOUT GANGRENE: ICD-10-CM

## 2021-07-19 DIAGNOSIS — L08.9 SKIN INFECTION: Primary | ICD-10-CM

## 2021-07-19 PROBLEM — Z71.2 ENCOUNTER TO DISCUSS TEST RESULTS: Status: ACTIVE | Noted: 2021-07-19

## 2021-07-19 PROCEDURE — 99024 POSTOP FOLLOW-UP VISIT: CPT | Performed by: SURGERY

## 2021-07-19 RX ORDER — SULFAMETHOXAZOLE AND TRIMETHOPRIM 800; 160 MG/1; MG/1
1 TABLET ORAL EVERY 12 HOURS SCHEDULED
Qty: 10 TABLET | Refills: 0 | Status: SHIPPED | OUTPATIENT
Start: 2021-07-19 | End: 2021-07-24

## 2021-07-19 NOTE — TELEPHONE ENCOUNTER
Patient of Dr New Bowman with history of cystitis glandularis and rectal cancer  Patient last had cystoscopy 1/11/2021 and plan was for another in 6 months  Patient had umbilical hernia repair with mesh on 7/8/21  Cystoscopy rescheduled for 8/10/21 at 1030am with Dr Rylie Avina   If patient cannot make this appt, will have to schedule next available and add to wait list

## 2021-07-19 NOTE — PROGRESS NOTES
Assessment/Plan:  I told him that because of the drainage I am going to start him on oral antibiotics for 5 days as there is the small chance of infection  I also told him that he should reschedule his cystoscopy by month  I am going to see him again in 1 week or p r n  No problem-specific Assessment & Plan notes found for this encounter  Diagnoses and all orders for this visit:    Skin infection  -     sulfamethoxazole-trimethoprim (BACTRIM DS) 800-160 mg per tablet; Take 1 tablet by mouth every 12 (twelve) hours for 5 days    Umbilical hernia without obstruction and without gangrene          Subjective:      Patient ID: Tereza Ellison is a 64 y o  male  66-year-old who is 11 days status post umbilical hernia repair with mesh  He says he saw some drainage last week from the  Incision  No fever  No pain  He is tolerating diet  Regular bowel movements  The following portions of the patient's history were reviewed and updated as appropriate: allergies, current medications, past family history, past medical history, past social history, past surgical history and problem list     Review of Systems   All other systems reviewed and are negative  Objective:      /80 (BP Location: Left arm, Patient Position: Sitting, Cuff Size: Adult)   Pulse 83   Temp 98 4 °F (36 9 °C)   Resp 18   Ht 5' 6" (1 676 m)   Wt 93 kg (205 lb)   BMI 33 09 kg/m²          Physical Exam  Vitals reviewed  Constitutional:       Appearance: He is obese  HENT:      Head: Normocephalic and atraumatic  Pulmonary:      Effort: Pulmonary effort is normal       Breath sounds: Normal breath sounds  Abdominal:      General: Abdomen is flat  Palpations: Abdomen is soft  Comments: There is exofin insitu  I was able to squeeze out some drainage which looked serosanguineous  There was no erythema  Neurological:      Mental Status: He is alert

## 2021-07-19 NOTE — TELEPHONE ENCOUNTER
Patient managed by Dr Darrell Rcio) patient called in stating he needs to cancel cysto for 7/23/21  Patient stated his surgeon advised him to put off the cysto for about a month  Attempted to reschedule, no slots available   Patient can be reached at [phone] with new date/time

## 2021-07-26 ENCOUNTER — OFFICE VISIT (OUTPATIENT)
Dept: SURGERY | Facility: CLINIC | Age: 61
End: 2021-07-26

## 2021-07-26 VITALS
TEMPERATURE: 98.1 F | HEART RATE: 85 BPM | BODY MASS INDEX: 33.11 KG/M2 | WEIGHT: 206 LBS | HEIGHT: 66 IN | DIASTOLIC BLOOD PRESSURE: 78 MMHG | SYSTOLIC BLOOD PRESSURE: 130 MMHG | RESPIRATION RATE: 18 BRPM

## 2021-07-26 DIAGNOSIS — K42.9 UMBILICAL HERNIA WITHOUT OBSTRUCTION AND WITHOUT GANGRENE: Primary | ICD-10-CM

## 2021-07-26 PROCEDURE — 99024 POSTOP FOLLOW-UP VISIT: CPT | Performed by: SURGERY

## 2021-08-05 DIAGNOSIS — I10 ESSENTIAL HYPERTENSION: ICD-10-CM

## 2021-08-05 RX ORDER — METOPROLOL SUCCINATE 100 MG/1
TABLET, EXTENDED RELEASE ORAL
Qty: 90 TABLET | Refills: 3 | Status: SHIPPED | OUTPATIENT
Start: 2021-08-05 | End: 2022-07-15

## 2021-08-09 NOTE — PROGRESS NOTES
Problem List Items Addressed This Visit        Genitourinary    Cystitis glandularis    Relevant Orders    Cytology, urine       Other    Encounter to discuss test results - Primary    Relevant Orders    Cytology, urine            Discussion:    No new bladder lesions, patient is voiding well  Has undergone an uncomplicated umbilical hernia repair in the interim since his last visit  He would like to follow-up in 1 year with cystoscopy versus in 6 months, this appears to be safe and reasonable based upon review of his cystoscopic surveillance images  Assessment and plan:     Please see problem oriented charting for the assessment plan of today's urological complaints      Luis Sanon MD      Chief Complaint     Surveillance cystoscopy      History of Present Illness     Anders Mcconnell is a 64 y o  Man with a history of rectal carcinoid tumor in 2011, he underwent a Transurethral resection of bladder lesion which showed some cystitis cystica and glandularis but no  Urothelial carcinoma  He has been undergoing periodic surveillance cystoscopies given that cystitis glandularis can occasionally show malignant transformation  Please see separate procedure note for details of today's office procedure  New complaints include None, voiding well, feeling well  ECOG of 0    The following portions of the patient's history were reviewed and updated as appropriate: allergies, current medications, past family history, past medical history, past social history, past surgical history and problem list     Detailed Urologic History     - please refer to HPI    Review of Systems     Review of Systems   Constitutional: Negative  HENT: Negative  Eyes: Negative  Respiratory: Negative  Cardiovascular: Negative  Gastrointestinal: Negative  Endocrine: Negative  Genitourinary: Negative  Musculoskeletal: Negative  Skin: Negative  Allergic/Immunologic: Negative  Neurological: Negative  Hematological: Negative  Psychiatric/Behavioral: Negative  Allergies     No Known Allergies    Physical Exam     Physical Exam  Vitals reviewed  Constitutional:       General: He is not in acute distress  Appearance: Normal appearance  He is not ill-appearing, toxic-appearing or diaphoretic  HENT:      Head: Normocephalic and atraumatic  Eyes:      General: No scleral icterus  Right eye: No discharge  Left eye: No discharge  Cardiovascular:      Pulses: Normal pulses  Pulmonary:      Effort: Pulmonary effort is normal    Abdominal:      General: There is no distension  Palpations: There is no mass  Tenderness: There is no abdominal tenderness  Hernia: No hernia is present  Genitourinary:     Penis: Normal     Musculoskeletal:         General: No swelling  Skin:     General: Skin is warm  Neurological:      General: No focal deficit present  Mental Status: He is alert  Cranial Nerves: No cranial nerve deficit  Psychiatric:         Mood and Affect: Mood normal          Behavior: Behavior normal          Thought Content: Thought content normal          Judgment: Judgment normal              Vital Signs  There were no vitals filed for this visit  Current Medications       Current Outpatient Medications:     Accu-Chek Danielle Plus test strip, TEST BLOOD SUGARS ONCE DAILY, Disp: , Rfl:     amLODIPine (NORVASC) 10 mg tablet, TAKE 1 TABLET EVERY DAY, Disp: 90 tablet, Rfl: 2    Ascorbic Acid (VITAMIN C) 500 MG CAPS, Take 1 tablet by mouth daily, Disp: , Rfl:     aspirin 81 MG tablet, Take 81 mg by mouth daily  , Disp: , Rfl:     atorvastatin (LIPITOR) 40 mg tablet, Take 40 mg by mouth daily at bedtime, Disp: , Rfl: 4    B Complex Vitamins (VITAMIN B COMPLEX PO), Take by mouth, Disp: , Rfl:     B-D UF III MINI PEN NEEDLES 31G X 5 MM MISC, USE 1 DAILY, Disp: , Rfl: 5    Basaglar KwikPen 100 units/mL injection pen, Inject 60 Units under the skin daily , Disp: , Rfl:     BD Pen Needle Nelsy U/F 32G X 4 MM MISC, 2 (two) times a day Test, Disp: , Rfl:     Cholecalciferol (CVS VITAMIN D) 2000 units CAPS, Take 2 tablets by mouth 2 (two) times a day, Disp: , Rfl:     Cinnamon 500 MG TABS, Take by mouth 2 (two) times a day , Disp: , Rfl:     coenzyme Q-10 100 MG capsule, Take 2 capsules by mouth daily, Disp: , Rfl:     Cyanocobalamin (Vitamin B-12) 2500 MCG SUBL, Place under the tongue, Disp: , Rfl:     Ferrous Sulfate (Iron) 325 (65 Fe) MG TABS, Take by mouth 2 (two) times a day, Disp: , Rfl:     GARLIC PO, Take by mouth, Disp: , Rfl:     glipiZIDE (GLUCOTROL XL) 10 mg 24 hr tablet, Take 5 mg by mouth daily , Disp: , Rfl:     JANUVIA 100 MG tablet, Take 100 mg by mouth daily, Disp: , Rfl: 2    Jardiance 10 MG TABS, 10 mg daily , Disp: , Rfl:     loratadine (CLARITIN) 10 mg tablet, Take 10 mg by mouth daily, Disp: , Rfl:     metFORMIN (GLUCOPHAGE-XR) 500 mg 24 hr tablet, Take 1,000 mg by mouth 2 (two) times a day, Disp: , Rfl:     metoprolol succinate (TOPROL-XL) 100 mg 24 hr tablet, TAKE 1 TABLET BY MOUTH DAILY, Disp: 90 tablet, Rfl: 3    Multiple Vitamin (MULTI-VITAMIN DAILY) TABS, Take 1 tablet by mouth daily, Disp: , Rfl:     NovoLOG FlexPen 100 units/mL injection pen, INJECT 8 UNITS SUBCUTANEOUSLY BEFORE DINNER, Disp: , Rfl:     olmesartan (BENICAR) 40 mg tablet, Take 1 tablet (40 mg total) by mouth daily, Disp: 90 tablet, Rfl: 2    pyridostigmine (MESTINON) 60 mg tablet, Take 60 mg by mouth 3 (three) times a day , Disp: , Rfl:     triamterene-hydrochlorothiazide (MAXZIDE-25) 37 5-25 mg per tablet, Take 1 tablet by mouth daily, Disp: 90 tablet, Rfl: 3    TURMERIC PO, Take by mouth, Disp: , Rfl:     VASCEPA 1 g CAPS, TAKE 1 CAPSULE BY MOUTH TWICE A DAY WITH MEALS, Disp: , Rfl: 2    zinc gluconate 50 mg tablet, Take 50 mg by mouth daily, Disp: , Rfl:       Active Problems     Patient Active Problem List Diagnosis    Essential hypertension    Diabetes mellitus without complication ([address] )    Dyslipidemia    JERO (obstructive sleep apnea)    Myasthenia gravis (HCC)    History of malignant carcinoid tumor of rectum    Cystitis glandularis    Hypertensive heart disease without heart failure    Pain in both lower extremities    Annual physical exam    Class 1 obesity due to excess calories with serious comorbidity and body mass index (BMI) of 34 0 to 34 9 in adult    Umbilical hernia without obstruction and without gangrene    Encounter to discuss test results         Past Medical History     Past Medical History:   Diagnosis Date    Carcinoid tumor of colon     Colon polyp     CPAP (continuous positive airway pressure) dependence     Hyperlipidemia     Myasthenia gravis ([address] )          Surgical History     Past Surgical History:   Procedure Laterality Date    BACK SURGERY  01/01/2001    lump removed     COLONOSCOPY      1/1/12,1/1/2016 tumor removed     DENTAL SURGERY      FL RETROGRADE PYELOGRAM  6/25/2020    NASAL POLYP EXCISION      NC COLONOSCOPY FLX DX W/COLLJ SPEC WHEN PFRMD N/A 7/19/2016    Procedure: COLONOSCOPY;  Surgeon: Prince Jordan MD;  Location: [facility]; Service: Gastroenterology    NC CYSTOURETHROSCOPY,FULGUR <0 5 CM LESN N/A 6/25/2020    Procedure: TRANSURETHRAL RESECTION OF BLADDER TUMOR (TURBT);   Surgeon: Efe Hirsch MD;  Location: [facility];  Service: Urology    NC CYSTOURETHROSCOPY,URETER CATHETER Bilateral 6/25/2020    Procedure: CYSTOSCOPY WITH RETROGRADE PYELOGRAM;  Surgeon: Efe Hirsch MD;  Location: [facility];  Service: Urology    NC REPAIR UMBILICAL BQGW,1+S/M,JIKFA N/A 7/8/2021    Procedure: REPAIR HERNIA UMBILICAL WITH MESH;  Surgeon: Hortencia Rodriguez MD;  Location: [facility];  Service: General    TRANSURETHRAL RESECTION OF PROSTATE           Family History     Family History   Problem Relation Age of Onset    Diabetes Mother     Heart disease Father     Heart failure Father     Prostate cancer Father     No Known Problems Sister          Social History     Social History     Social History     Tobacco Use   Smoking Status Never Smoker   Smokeless Tobacco Never Used         Pertinent Lab Values     Lab Results   Component Value Date    CREATININE 1 08 06/30/2021       Lab Results   Component Value Date    PSA 2 3 03/08/2021    PSA 1 9 06/04/2020             Pertinent Imaging       No new urologic imaging for my review

## 2021-08-10 ENCOUNTER — PROCEDURE VISIT (OUTPATIENT)
Dept: UROLOGY | Facility: CLINIC | Age: 61
End: 2021-08-10
Payer: COMMERCIAL

## 2021-08-10 VITALS
BODY MASS INDEX: 32.78 KG/M2 | HEART RATE: 83 BPM | HEIGHT: 66 IN | DIASTOLIC BLOOD PRESSURE: 90 MMHG | SYSTOLIC BLOOD PRESSURE: 162 MMHG | WEIGHT: 204 LBS

## 2021-08-10 DIAGNOSIS — N30.80 CYSTITIS GLANDULARIS: ICD-10-CM

## 2021-08-10 DIAGNOSIS — Z71.2 ENCOUNTER TO DISCUSS TEST RESULTS: Primary | ICD-10-CM

## 2021-08-10 PROCEDURE — 3008F BODY MASS INDEX DOCD: CPT | Performed by: UROLOGY

## 2021-08-10 PROCEDURE — 99213 OFFICE O/P EST LOW 20 MIN: CPT | Performed by: UROLOGY

## 2021-08-10 PROCEDURE — 3080F DIAST BP >= 90 MM HG: CPT | Performed by: UROLOGY

## 2021-08-10 PROCEDURE — 52000 CYSTOURETHROSCOPY: CPT | Performed by: UROLOGY

## 2021-08-10 PROCEDURE — 88112 CYTOPATH CELL ENHANCE TECH: CPT | Performed by: PATHOLOGY

## 2021-08-10 PROCEDURE — 1036F TOBACCO NON-USER: CPT | Performed by: UROLOGY

## 2021-08-10 PROCEDURE — 3077F SYST BP >= 140 MM HG: CPT | Performed by: UROLOGY

## 2021-08-10 NOTE — PROGRESS NOTES
Office Cystoscopy Procedure Note    Indication:    Cystitis glandularis    Informed consent   The risks, benefits, complications, treatment options, and expected outcomes were discussed with the patient  The patient concurred with the proposed plan and provided informed consent  Anesthesia  Lidocaine jelly 2%    Antibiotic prophylaxis   None    Procedure  The patient was placed in the supineposition, was prepped and draped in the usual manner using sterile technique, and 2% lidocaine jelly instilled into the urethra  A 17 F flexible cystoscope was then inserted into the urethra and the urethra and bladder carefully examined  The following findings were noted:    Findings:  Urethra:   normal caliber  Prostate:   lateral lobe hypertrophy, some elevation of the bladder neck, small median lobe also present  Bladder:   no lesions, tumors, defects, or stones, a small mucosal bleb is noted which is a stable finding, no new bladder lesions or CIS  Ureteral orifices:   orthotopic, clear urine exiting  Other findings:   none, retroflexed view confirms    Specimens: None                 Complications:    None; patient tolerated the procedure well           Disposition: To home            Condition: Stable    Plan:  patient with no concerning changes to the previous area of resection showing cystitis glandularis, I would recommend yearly cystoscopy going forward given the low chance of malignant transformation in the future  Cystoscopy     Date/Time 8/10/2021 10:58 AM     Performed by  Leeanne Piedra MD     Authorized by Leeanne Piedra MD      Universal Protocol:  Consent: Verbal consent obtained  Written consent obtained    Risks and benefits: risks, benefits and alternatives were discussed  Consent given by: patient  Patient understanding: patient states understanding of the procedure being performed  Patient consent: the patient's understanding of the procedure matches consent given  Procedure consent: procedure consent matches procedure scheduled  Relevant documents: relevant documents present and verified  Test results: test results available and properly labeled  Site marked: the operative site was not marked  Radiology Images displayed and confirmed  If images not available, report reviewed: imaging studies available  Required items: required blood products, implants, devices, and special equipment available  Patient identity confirmed: verbally with patient and provided demographic data        Procedure Details:  Procedure type: cystoscopy    Patient tolerance: Patient tolerated the procedure well with no immediate complications    Additional Procedure Details: Office Cystoscopy Procedure Note    Indication:    Cystitis glandularis    Informed consent   The risks, benefits, complications, treatment options, and expected outcomes were discussed with the patient  The patient concurred with the proposed plan and provided informed consent  Anesthesia  Lidocaine jelly 2%    Antibiotic prophylaxis   None    Procedure  The patient was placed in the supineposition, was prepped and draped in the usual manner using sterile technique, and 2% lidocaine jelly instilled into the urethra  A 17 F flexible cystoscope was then inserted into the urethra and the urethra and bladder carefully examined    The following findings were noted:    Findings:  Urethra:   normal caliber  Prostate:   lateral lobe hypertrophy, some elevation of the bladder neck, small median lobe also present  Bladder:   no lesions, tumors, defects, or stones, a small mucosal bleb is noted which is a stable finding, no new bladder lesions or CIS  Ureteral orifices:   orthotopic, clear urine exiting  Other findings:   none, retroflexed view confirms    Specimens: None                 Complications:    None; patient tolerated the procedure well           Disposition: To home            Condition: Stable    Plan:  patient with no concerning changes to the previous area of resection showing cystitis glandularis, I would recommend yearly cystoscopy going forward given the low chance of malignant transformation in the future

## 2021-08-10 NOTE — LETTER
August 10, 2021     Joshua Daniels MD  [address]    Patient: Adonay Aceves [facility]   YOB: 1960   Date of Visit: 8/10/2021       Dear Dr Lillie Mcfarlane: Thank you for referring Renay Mckee to me for evaluation  Below are my notes for this consultation  If you have questions, please do not hesitate to call me  I look forward to following your patient along with you  Sincerely,        Rachel Nassar MD        CC: MD Rachel Ford MD  8/10/2021 11:00 AM  Sign when Signing Visit  Office Cystoscopy Procedure Note    Indication:    Cystitis glandularis    Informed consent   The risks, benefits, complications, treatment options, and expected outcomes were discussed with the patient  The patient concurred with the proposed plan and provided informed consent  Anesthesia  Lidocaine jelly 2%    Antibiotic prophylaxis   None    Procedure  The patient was placed in the supineposition, was prepped and draped in the usual manner using sterile technique, and 2% lidocaine jelly instilled into the urethra  A 17 F flexible cystoscope was then inserted into the urethra and the urethra and bladder carefully examined    The following findings were noted:    Findings:  Urethra:   normal caliber  Prostate:   lateral lobe hypertrophy, some elevation of the bladder neck, small median lobe also present  Bladder:   no lesions, tumors, defects, or stones, a small mucosal bleb is noted which is a stable finding, no new bladder lesions or CIS  Ureteral orifices:   orthotopic, clear urine exiting  Other findings:   none, retroflexed view confirms    Specimens: None                 Complications:    None; patient tolerated the procedure well           Disposition: To home            Condition: Stable    Plan:  patient with no concerning changes to the previous area of resection showing cystitis glandularis, I would recommend yearly cystoscopy going forward given the low chance of malignant transformation in the future  Cystoscopy     Date/Time 8/10/2021 10:58 AM     Performed by  Luis Sanon MD     Authorized by Luis Sanon MD      Universal Protocol:  Consent: Verbal consent obtained  Written consent obtained  Risks and benefits: risks, benefits and alternatives were discussed  Consent given by: patient  Patient understanding: patient states understanding of the procedure being performed  Patient consent: the patient's understanding of the procedure matches consent given  Procedure consent: procedure consent matches procedure scheduled  Relevant documents: relevant documents present and verified  Test results: test results available and properly labeled  Site marked: the operative site was not marked  Radiology Images displayed and confirmed  If images not available, report reviewed: imaging studies available  Required items: required blood products, implants, devices, and special equipment available  Patient identity confirmed: verbally with patient and provided demographic data        Procedure Details:  Procedure type: cystoscopy    Patient tolerance: Patient tolerated the procedure well with no immediate complications    Additional Procedure Details: Office Cystoscopy Procedure Note    Indication:    Cystitis glandularis    Informed consent   The risks, benefits, complications, treatment options, and expected outcomes were discussed with the patient  The patient concurred with the proposed plan and provided informed consent  Anesthesia  Lidocaine jelly 2%    Antibiotic prophylaxis   None    Procedure  The patient was placed in the supineposition, was prepped and draped in the usual manner using sterile technique, and 2% lidocaine jelly instilled into the urethra  A 17 F flexible cystoscope was then inserted into the urethra and the urethra and bladder carefully examined    The following findings were noted:    Findings:  Urethra:   normal caliber  Prostate:   lateral lobe hypertrophy, some elevation of the bladder neck, small median lobe also present  Bladder:   no lesions, tumors, defects, or stones, a small mucosal bleb is noted which is a stable finding, no new bladder lesions or CIS  Ureteral orifices:   orthotopic, clear urine exiting  Other findings:   none, retroflexed view confirms    Specimens: None                 Complications:    None; patient tolerated the procedure well           Disposition: To home            Condition: Stable    Plan:  patient with no concerning changes to the previous area of resection showing cystitis glandularis, I would recommend yearly cystoscopy going forward given the low chance of malignant transformation in the future  Snow Loving MD  8/10/2021 10:55 AM  Sign when Signing Visit       Problem List Items Addressed This Visit        Genitourinary    Cystitis glandularis    Relevant Orders    Cytology, urine       Other    Encounter to discuss test results - Primary    Relevant Orders    Cytology, urine            Discussion:    No new bladder lesions, patient is voiding well  Has undergone an uncomplicated umbilical hernia repair in the interim since his last visit  He would like to follow-up in 1 year with cystoscopy versus in 6 months, this appears to be safe and reasonable based upon review of his cystoscopic surveillance images  Assessment and plan:     Please see problem oriented charting for the assessment plan of today's urological complaints      Snow Loving MD      Chief Complaint     Surveillance cystoscopy      History of Present Illness     Deuce Beltran is a 64 y o  Man with a history of rectal carcinoid tumor in 2011, he underwent a Transurethral resection of bladder lesion which showed some cystitis cystica and glandularis but no  Urothelial carcinoma      He has been undergoing periodic surveillance cystoscopies given that cystitis glandularis can occasionally show malignant transformation  Please see separate procedure note for details of today's office procedure  New complaints include None, voiding well, feeling well  ECOG of 0    The following portions of the patient's history were reviewed and updated as appropriate: allergies, current medications, past family history, past medical history, past social history, past surgical history and problem list     Detailed Urologic History     - please refer to HPI    Review of Systems     Review of Systems   Constitutional: Negative  HENT: Negative  Eyes: Negative  Respiratory: Negative  Cardiovascular: Negative  Gastrointestinal: Negative  Endocrine: Negative  Genitourinary: Negative  Musculoskeletal: Negative  Skin: Negative  Allergic/Immunologic: Negative  Neurological: Negative  Hematological: Negative  Psychiatric/Behavioral: Negative  Allergies     No Known Allergies    Physical Exam     Physical Exam  Vitals reviewed  Constitutional:       General: He is not in acute distress  Appearance: Normal appearance  He is not ill-appearing, toxic-appearing or diaphoretic  HENT:      Head: Normocephalic and atraumatic  Eyes:      General: No scleral icterus  Right eye: No discharge  Left eye: No discharge  Cardiovascular:      Pulses: Normal pulses  Pulmonary:      Effort: Pulmonary effort is normal    Abdominal:      General: There is no distension  Palpations: There is no mass  Tenderness: There is no abdominal tenderness  Hernia: No hernia is present  Genitourinary:     Penis: Normal     Musculoskeletal:         General: No swelling  Skin:     General: Skin is warm  Neurological:      General: No focal deficit present  Mental Status: He is alert  Cranial Nerves: No cranial nerve deficit     Psychiatric:         Mood and Affect: Mood normal          Behavior: Behavior normal          Thought Content: Thought content normal          Judgment: Judgment normal              Vital Signs  There were no vitals filed for this visit  Current Medications       Current Outpatient Medications:     Accu-Chek Danielle Plus test strip, TEST BLOOD SUGARS ONCE DAILY, Disp: , Rfl:     amLODIPine (NORVASC) 10 mg tablet, TAKE 1 TABLET EVERY DAY, Disp: 90 tablet, Rfl: 2    Ascorbic Acid (VITAMIN C) 500 MG CAPS, Take 1 tablet by mouth daily, Disp: , Rfl:     aspirin 81 MG tablet, Take 81 mg by mouth daily  , Disp: , Rfl:     atorvastatin (LIPITOR) 40 mg tablet, Take 40 mg by mouth daily at bedtime, Disp: , Rfl: 4    B Complex Vitamins (VITAMIN B COMPLEX PO), Take by mouth, Disp: , Rfl:     B-D UF III MINI PEN NEEDLES 31G X 5 MM MISC, USE 1 DAILY, Disp: , Rfl: 5    Basaglar KwikPen 100 units/mL injection pen, Inject 60 Units under the skin daily , Disp: , Rfl:     BD Pen Needle Nelsy U/F 32G X 4 MM MISC, 2 (two) times a day Test, Disp: , Rfl:     Cholecalciferol (CVS VITAMIN D) 2000 units CAPS, Take 2 tablets by mouth 2 (two) times a day, Disp: , Rfl:     Cinnamon 500 MG TABS, Take by mouth 2 (two) times a day , Disp: , Rfl:     coenzyme Q-10 100 MG capsule, Take 2 capsules by mouth daily, Disp: , Rfl:     Cyanocobalamin (Vitamin B-12) 2500 MCG SUBL, Place under the tongue, Disp: , Rfl:     Ferrous Sulfate (Iron) 325 (65 Fe) MG TABS, Take by mouth 2 (two) times a day, Disp: , Rfl:     GARLIC PO, Take by mouth, Disp: , Rfl:     glipiZIDE (GLUCOTROL XL) 10 mg 24 hr tablet, Take 5 mg by mouth daily , Disp: , Rfl:     JANUVIA 100 MG tablet, Take 100 mg by mouth daily, Disp: , Rfl: 2    Jardiance 10 MG TABS, 10 mg daily , Disp: , Rfl:     loratadine (CLARITIN) 10 mg tablet, Take 10 mg by mouth daily, Disp: , Rfl:     metFORMIN (GLUCOPHAGE-XR) 500 mg 24 hr tablet, Take 1,000 mg by mouth 2 (two) times a day, Disp: , Rfl:     metoprolol succinate (TOPROL-XL) 100 mg 24 hr tablet, TAKE 1 TABLET BY MOUTH DAILY, Disp: 90 tablet, Rfl: 3    Multiple Vitamin (MULTI-VITAMIN DAILY) TABS, Take 1 tablet by mouth daily, Disp: , Rfl:     NovoLOG FlexPen 100 units/mL injection pen, INJECT 8 UNITS SUBCUTANEOUSLY BEFORE DINNER, Disp: , Rfl:     olmesartan (BENICAR) 40 mg tablet, Take 1 tablet (40 mg total) by mouth daily, Disp: 90 tablet, Rfl: 2    pyridostigmine (MESTINON) 60 mg tablet, Take 60 mg by mouth 3 (three) times a day , Disp: , Rfl:     triamterene-hydrochlorothiazide (MAXZIDE-25) 37 5-25 mg per tablet, Take 1 tablet by mouth daily, Disp: 90 tablet, Rfl: 3    TURMERIC PO, Take by mouth, Disp: , Rfl:     VASCEPA 1 g CAPS, TAKE 1 CAPSULE BY MOUTH TWICE A DAY WITH MEALS, Disp: , Rfl: 2    zinc gluconate 50 mg tablet, Take 50 mg by mouth daily, Disp: , Rfl:       Active Problems     Patient Active Problem List   Diagnosis    Essential hypertension    Diabetes mellitus without complication ([facility] [address] )    Dyslipidemia    JERO (obstructive sleep apnea)    Myasthenia gravis (HCC)    History of malignant carcinoid tumor of rectum    Cystitis glandularis    Hypertensive heart disease without heart failure    Pain in both lower extremities    Annual physical exam    Class 1 obesity due to excess calories with serious comorbidity and body mass index (BMI) of 34 0 to 34 9 in adult    Umbilical hernia without obstruction and without gangrene    Encounter to discuss test results         Past Medical History     Past Medical History:   Diagnosis Date    Carcinoid tumor of colon     Colon polyp     CPAP (continuous positive airway pressure) dependence     Hyperlipidemia     Myasthenia gravis ([facility] [address] )          Surgical History     Past Surgical History:   Procedure Laterality Date    BACK SURGERY  01/01/2001    lump removed     COLONOSCOPY      1/1/12,1/1/2016 tumor removed     DENTAL SURGERY      FL RETROGRADE PYELOGRAM  6/25/2020    NASAL POLYP EXCISION      NV COLONOSCOPY FLX DX W/COLLJ SPEC WHEN PFRMD N/A 7/19/2016 Procedure: COLONOSCOPY;  Surgeon: Leann Silva MD;  [facility]; Service: Gastroenterology    MS CYSTOURETHROSCOPY,FULGUR <0 5 CM LESN N/A 6/25/2020    Procedure: TRANSURETHRAL RESECTION OF BLADDER TUMOR (TURBT);   Surgeon: Linda Peters MD;  Location: [facility];  Service: Urology    MS CYSTOURETHROSCOPY,URETER CATHETER Bilateral 6/25/2020    Procedure: CYSTOSCOPY WITH RETROGRADE PYELOGRAM;  Surgeon: Linda Peters MD;  [facility];  Service: Urology    MS REPAIR UMBILICAL GRSS,0+R/P,ZOGUC N/A 7/8/2021    Procedure: REPAIR HERNIA UMBILICAL WITH MESH;  Surgeon: Viktoriya Dodge MD;  Location: [facility];  Service: General    TRANSURETHRAL RESECTION OF PROSTATE           Family History     Family History   Problem Relation Age of Onset    Diabetes Mother     Heart disease Father     Heart failure Father     Prostate cancer Father     No Known Problems Sister          Social History     Social History     Social History     Tobacco Use   Smoking Status Never Smoker   Smokeless Tobacco Never Used         Pertinent Lab Values     Lab Results   Component Value Date    CREATININE 1 08 06/30/2021       Lab Results   Component Value Date    PSA 2 3 03/08/2021    PSA 1 9 06/04/2020             Pertinent Imaging       No new urologic imaging for my review

## 2021-08-30 ENCOUNTER — APPOINTMENT (OUTPATIENT)
Dept: LAB | Facility: AMBULARY SURGERY CENTER | Age: 61
End: 2021-08-30
Payer: COMMERCIAL

## 2021-08-30 DIAGNOSIS — E11.649 UNCONTROLLED TYPE 2 DIABETES MELLITUS WITH HYPOGLYCEMIA, UNSPECIFIED HYPOGLYCEMIA COMA STATUS (HCC): ICD-10-CM

## 2021-08-30 DIAGNOSIS — Z15.89 BIALLELIC MUTATION OF F10 GENE: ICD-10-CM

## 2021-08-30 DIAGNOSIS — E78.5 HYPERLIPIDEMIA, UNSPECIFIED HYPERLIPIDEMIA TYPE: ICD-10-CM

## 2021-08-30 LAB
ALBUMIN SERPL BCP-MCNC: 3.9 G/DL (ref 3.5–5)
ALP SERPL-CCNC: 69 U/L (ref 46–116)
ALT SERPL W P-5'-P-CCNC: 23 U/L (ref 12–78)
ANION GAP SERPL CALCULATED.3IONS-SCNC: 2 MMOL/L (ref 4–13)
AST SERPL W P-5'-P-CCNC: 15 U/L (ref 5–45)
BILIRUB SERPL-MCNC: 0.45 MG/DL (ref 0.2–1)
BUN SERPL-MCNC: 13 MG/DL (ref 5–25)
CALCIUM SERPL-MCNC: 9.4 MG/DL (ref 8.3–10.1)
CHLORIDE SERPL-SCNC: 108 MMOL/L (ref 100–108)
CO2 SERPL-SCNC: 31 MMOL/L (ref 21–32)
CREAT SERPL-MCNC: 1.07 MG/DL (ref 0.6–1.3)
EST. AVERAGE GLUCOSE BLD GHB EST-MCNC: 123 MG/DL
GFR SERPL CREATININE-BSD FRML MDRD: 86 ML/MIN/1.73SQ M
GLUCOSE P FAST SERPL-MCNC: 64 MG/DL (ref 65–99)
HBA1C MFR BLD: 5.9 %
POTASSIUM SERPL-SCNC: 3.5 MMOL/L (ref 3.5–5.3)
PROT SERPL-MCNC: 7.9 G/DL (ref 6.4–8.2)
SODIUM SERPL-SCNC: 141 MMOL/L (ref 136–145)

## 2021-08-30 PROCEDURE — 3060F POS MICROALBUMINURIA REV: CPT | Performed by: UROLOGY

## 2021-08-30 PROCEDURE — 36415 COLL VENOUS BLD VENIPUNCTURE: CPT

## 2021-08-30 PROCEDURE — 86316 IMMUNOASSAY TUMOR OTHER: CPT

## 2021-08-30 PROCEDURE — 80053 COMPREHEN METABOLIC PANEL: CPT

## 2021-08-30 PROCEDURE — 83036 HEMOGLOBIN GLYCOSYLATED A1C: CPT

## 2021-08-30 PROCEDURE — 3044F HG A1C LEVEL LT 7.0%: CPT | Performed by: UROLOGY

## 2021-09-02 LAB — CGA SERPL-MCNC: 80.3 NG/ML (ref 0–101.8)

## 2021-10-18 ENCOUNTER — APPOINTMENT (OUTPATIENT)
Dept: LAB | Facility: AMBULARY SURGERY CENTER | Age: 61
End: 2021-10-18
Payer: COMMERCIAL

## 2021-10-18 DIAGNOSIS — D50.8 IRON DEFICIENCY ANEMIA SECONDARY TO INADEQUATE DIETARY IRON INTAKE: ICD-10-CM

## 2021-10-18 DIAGNOSIS — D3A.029 CARCINOID TUMOR OF LARGE INTESTINE, UNSPECIFIED LOCATION, UNSPECIFIED WHETHER MALIGNANT: ICD-10-CM

## 2021-10-18 LAB
ALBUMIN SERPL BCP-MCNC: 4.1 G/DL (ref 3.5–5)
ALP SERPL-CCNC: 64 U/L (ref 46–116)
ALT SERPL W P-5'-P-CCNC: 29 U/L (ref 12–78)
ANION GAP SERPL CALCULATED.3IONS-SCNC: 1 MMOL/L (ref 4–13)
AST SERPL W P-5'-P-CCNC: 23 U/L (ref 5–45)
BASOPHILS # BLD AUTO: 0.05 THOUSANDS/ΜL (ref 0–0.1)
BASOPHILS NFR BLD AUTO: 1 % (ref 0–1)
BILIRUB SERPL-MCNC: 0.95 MG/DL (ref 0.2–1)
BUN SERPL-MCNC: 13 MG/DL (ref 5–25)
CALCIUM SERPL-MCNC: 9.6 MG/DL (ref 8.3–10.1)
CHLORIDE SERPL-SCNC: 107 MMOL/L (ref 100–108)
CO2 SERPL-SCNC: 32 MMOL/L (ref 21–32)
CREAT SERPL-MCNC: 1.12 MG/DL (ref 0.6–1.3)
EOSINOPHIL # BLD AUTO: 0.17 THOUSAND/ΜL (ref 0–0.61)
EOSINOPHIL NFR BLD AUTO: 4 % (ref 0–6)
ERYTHROCYTE [DISTWIDTH] IN BLOOD BY AUTOMATED COUNT: 14.2 % (ref 11.6–15.1)
FERRITIN SERPL-MCNC: 75 NG/ML (ref 8–388)
GFR SERPL CREATININE-BSD FRML MDRD: 82 ML/MIN/1.73SQ M
GLUCOSE P FAST SERPL-MCNC: 79 MG/DL (ref 65–99)
HCT VFR BLD AUTO: 47.5 % (ref 36.5–49.3)
HGB BLD-MCNC: 14.8 G/DL (ref 12–17)
IMM GRANULOCYTES # BLD AUTO: 0.02 THOUSAND/UL (ref 0–0.2)
IMM GRANULOCYTES NFR BLD AUTO: 0 % (ref 0–2)
IRON SATN MFR SERPL: 12 % (ref 20–50)
IRON SERPL-MCNC: 45 UG/DL (ref 65–175)
LYMPHOCYTES # BLD AUTO: 1.14 THOUSANDS/ΜL (ref 0.6–4.47)
LYMPHOCYTES NFR BLD AUTO: 23 % (ref 14–44)
MCH RBC QN AUTO: 26.4 PG (ref 26.8–34.3)
MCHC RBC AUTO-ENTMCNC: 31.2 G/DL (ref 31.4–37.4)
MCV RBC AUTO: 85 FL (ref 82–98)
MONOCYTES # BLD AUTO: 0.59 THOUSAND/ΜL (ref 0.17–1.22)
MONOCYTES NFR BLD AUTO: 12 % (ref 4–12)
NEUTROPHILS # BLD AUTO: 2.95 THOUSANDS/ΜL (ref 1.85–7.62)
NEUTS SEG NFR BLD AUTO: 60 % (ref 43–75)
NRBC BLD AUTO-RTO: 0 /100 WBCS
PLATELET # BLD AUTO: 239 THOUSANDS/UL (ref 149–390)
PMV BLD AUTO: 10.3 FL (ref 8.9–12.7)
POTASSIUM SERPL-SCNC: 3.4 MMOL/L (ref 3.5–5.3)
PROT SERPL-MCNC: 7.8 G/DL (ref 6.4–8.2)
RBC # BLD AUTO: 5.6 MILLION/UL (ref 3.88–5.62)
SODIUM SERPL-SCNC: 140 MMOL/L (ref 136–145)
TIBC SERPL-MCNC: 379 UG/DL (ref 250–450)
WBC # BLD AUTO: 4.92 THOUSAND/UL (ref 4.31–10.16)

## 2021-10-18 PROCEDURE — 85025 COMPLETE CBC W/AUTO DIFF WBC: CPT

## 2021-10-18 PROCEDURE — 36415 COLL VENOUS BLD VENIPUNCTURE: CPT

## 2021-10-18 PROCEDURE — 80053 COMPREHEN METABOLIC PANEL: CPT

## 2021-10-18 PROCEDURE — 83540 ASSAY OF IRON: CPT

## 2021-10-18 PROCEDURE — 82728 ASSAY OF FERRITIN: CPT

## 2021-10-18 PROCEDURE — 83550 IRON BINDING TEST: CPT

## 2021-10-18 PROCEDURE — 83918 ORGANIC ACIDS TOTAL QUANT: CPT

## 2021-10-22 LAB
METHYLMALONATE SERPL-SCNC: 96 NMOL/L (ref 0–378)
SL AMB DISCLAIMER: NORMAL

## 2021-10-25 ENCOUNTER — HOSPITAL ENCOUNTER (OUTPATIENT)
Dept: CT IMAGING | Facility: HOSPITAL | Age: 61
Discharge: HOME | End: 2021-10-25
Attending: INTERNAL MEDICINE
Payer: COMMERCIAL

## 2021-10-25 DIAGNOSIS — D3A.029 CARCINOID TUMOR OF LARGE INTESTINE, UNSPECIFIED LOCATION, UNSPECIFIED WHETHER MALIGNANT: ICD-10-CM

## 2021-10-25 PROCEDURE — G1004 CDSM NDSC: HCPCS

## 2021-10-25 PROCEDURE — 74177 CT ABD & PELVIS W/CONTRAST: CPT

## 2021-10-25 PROCEDURE — 71260 CT THORAX DX C+: CPT

## 2021-10-25 RX ADMIN — IOHEXOL 100 ML: 350 INJECTION, SOLUTION INTRAVENOUS at 15:47

## 2021-11-04 ENCOUNTER — OFFICE VISIT (OUTPATIENT)
Dept: HEMATOLOGY ONCOLOGY | Facility: CLINIC | Age: 61
End: 2021-11-04
Payer: COMMERCIAL

## 2021-11-04 VITALS
HEART RATE: 66 BPM | RESPIRATION RATE: 14 BRPM | DIASTOLIC BLOOD PRESSURE: 80 MMHG | TEMPERATURE: 96.6 F | OXYGEN SATURATION: 98 % | SYSTOLIC BLOOD PRESSURE: 150 MMHG | HEIGHT: 66 IN | BODY MASS INDEX: 32.96 KG/M2 | WEIGHT: 205.1 LBS

## 2021-11-04 DIAGNOSIS — D3A.029 CARCINOID TUMOR OF LARGE INTESTINE, UNSPECIFIED LOCATION, UNSPECIFIED WHETHER MALIGNANT: Primary | ICD-10-CM

## 2021-11-04 PROCEDURE — 99214 OFFICE O/P EST MOD 30 MIN: CPT | Performed by: INTERNAL MEDICINE

## 2021-11-16 ENCOUNTER — OFFICE VISIT (OUTPATIENT)
Dept: FAMILY MEDICINE CLINIC | Facility: CLINIC | Age: 61
End: 2021-11-16
Payer: COMMERCIAL

## 2021-11-16 VITALS
RESPIRATION RATE: 16 BRPM | SYSTOLIC BLOOD PRESSURE: 120 MMHG | WEIGHT: 201 LBS | DIASTOLIC BLOOD PRESSURE: 62 MMHG | OXYGEN SATURATION: 96 % | HEART RATE: 74 BPM | BODY MASS INDEX: 32.3 KG/M2 | HEIGHT: 66 IN | TEMPERATURE: 97.4 F

## 2021-11-16 DIAGNOSIS — N40.0 BENIGN PROSTATIC HYPERPLASIA WITHOUT LOWER URINARY TRACT SYMPTOMS: ICD-10-CM

## 2021-11-16 DIAGNOSIS — E11.9 DIABETES MELLITUS WITHOUT COMPLICATION (HCC): Primary | ICD-10-CM

## 2021-11-16 DIAGNOSIS — G70.00 MYASTHENIA GRAVIS (HCC): ICD-10-CM

## 2021-11-16 DIAGNOSIS — Z23 NEED FOR INFLUENZA VACCINATION: ICD-10-CM

## 2021-11-16 DIAGNOSIS — K80.20 GALLSTONES: ICD-10-CM

## 2021-11-16 DIAGNOSIS — G47.33 OSA (OBSTRUCTIVE SLEEP APNEA): ICD-10-CM

## 2021-11-16 DIAGNOSIS — M79.604 PAIN IN BOTH LOWER EXTREMITIES: ICD-10-CM

## 2021-11-16 DIAGNOSIS — I10 ESSENTIAL HYPERTENSION: ICD-10-CM

## 2021-11-16 DIAGNOSIS — M79.605 PAIN IN BOTH LOWER EXTREMITIES: ICD-10-CM

## 2021-11-16 DIAGNOSIS — Z85.040 HISTORY OF MALIGNANT CARCINOID TUMOR OF RECTUM: ICD-10-CM

## 2021-11-16 DIAGNOSIS — E78.5 DYSLIPIDEMIA: ICD-10-CM

## 2021-11-16 PROCEDURE — 1036F TOBACCO NON-USER: CPT | Performed by: FAMILY MEDICINE

## 2021-11-16 PROCEDURE — 3078F DIAST BP <80 MM HG: CPT | Performed by: FAMILY MEDICINE

## 2021-11-16 PROCEDURE — 3074F SYST BP LT 130 MM HG: CPT | Performed by: FAMILY MEDICINE

## 2021-11-16 PROCEDURE — 3725F SCREEN DEPRESSION PERFORMED: CPT | Performed by: FAMILY MEDICINE

## 2021-11-16 PROCEDURE — 99214 OFFICE O/P EST MOD 30 MIN: CPT | Performed by: FAMILY MEDICINE

## 2021-11-16 PROCEDURE — 3008F BODY MASS INDEX DOCD: CPT | Performed by: FAMILY MEDICINE

## 2022-01-06 DIAGNOSIS — I10 ESSENTIAL (PRIMARY) HYPERTENSION: ICD-10-CM

## 2022-01-06 PROCEDURE — 4010F ACE/ARB THERAPY RXD/TAKEN: CPT | Performed by: INTERNAL MEDICINE

## 2022-01-06 RX ORDER — OLMESARTAN MEDOXOMIL 40 MG/1
TABLET ORAL
Qty: 90 TABLET | Refills: 2 | Status: SHIPPED | OUTPATIENT
Start: 2022-01-06

## 2022-01-10 ENCOUNTER — OFFICE VISIT (OUTPATIENT)
Dept: CARDIOLOGY CLINIC | Facility: CLINIC | Age: 62
End: 2022-01-10
Payer: COMMERCIAL

## 2022-01-10 VITALS
HEART RATE: 84 BPM | HEIGHT: 66 IN | WEIGHT: 205.4 LBS | DIASTOLIC BLOOD PRESSURE: 68 MMHG | BODY MASS INDEX: 33.01 KG/M2 | OXYGEN SATURATION: 98 % | SYSTOLIC BLOOD PRESSURE: 134 MMHG

## 2022-01-10 DIAGNOSIS — G47.33 OSA (OBSTRUCTIVE SLEEP APNEA): ICD-10-CM

## 2022-01-10 DIAGNOSIS — E78.5 DYSLIPIDEMIA: ICD-10-CM

## 2022-01-10 DIAGNOSIS — I10 ESSENTIAL HYPERTENSION: Primary | ICD-10-CM

## 2022-01-10 PROCEDURE — 99214 OFFICE O/P EST MOD 30 MIN: CPT | Performed by: INTERNAL MEDICINE

## 2022-01-10 PROCEDURE — 3075F SYST BP GE 130 - 139MM HG: CPT | Performed by: INTERNAL MEDICINE

## 2022-01-10 PROCEDURE — 1036F TOBACCO NON-USER: CPT | Performed by: INTERNAL MEDICINE

## 2022-01-10 PROCEDURE — 3078F DIAST BP <80 MM HG: CPT | Performed by: INTERNAL MEDICINE

## 2022-01-10 PROCEDURE — 3008F BODY MASS INDEX DOCD: CPT | Performed by: INTERNAL MEDICINE

## 2022-01-10 NOTE — PROGRESS NOTES
Cardiology Followup    [facility]  [id number]  1960  Michelle Bowers [address] [facility] [place] [address]  [place] [address]    1  Essential hypertension     2  Dyslipidemia     3  JERO (obstructive sleep apnea)         Discussion/Summary:    1  Hypertension - Willem's blood pressure has been under control on average over the last year  I reviewed his blood pressure readings that he records meticulously from home  No changes were made this year  We went over following a low-sodium diet and following his blood pressure closely  He knows to call if anything changes  We will otherwise see him back in 1 year  2   Hyperlipidemia - His LDL is at goal   He will remain on the same dose of atorvastatin  He will continue to have blood work followed closely  3   Obstructive sleep apnea - He is compliant on CPAP       HPI:    Mr Robin James comes in for a 1 year follow-up given his risk factors for cardiovascular disease  I met him about a year ago  He followed with Cardiology out of [place], but with his cardiologist retiring, he is now establishing care with us  He denies any cardiac events or any procedures performed from a cardiac standpoint  From what I gather it sounded as if he had a questionable history of left ventricular hypertrophy and hypertensive heart disease, and has been followed closely by Cardiology for treatment of his risk factors  He does have obstructive sleep apnea and is compliant on CPAP  He also has diabetes mellitus and hyperlipidemia  His LDL has been at goal on atorvastatin  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 and follows with oncology  Willem's blood pressure has been under good control    When I initially met him I had him undergo an echocardiogram that was essentially normal   There was no evidence of left ventricular hypertrophy and his ejection fraction was normal   No significant valve disease  His blood pressure remains under control to this day any checks and meticulously at home  Elida Encarnacion denies any symptoms from a cardiac standpoint  No chest pain or any symptoms of angina  He denies shortness of breath or any exercise intolerance  He tells me he exercises from a cardiovascular standpoint regularly and can do this without symptoms or limitations  He denies any signs or symptoms of CHF  He denies palpitations, lightheadedness or any history of syncope  Patient Active Problem List   Diagnosis    Essential hypertension    Diabetes mellitus without complication (HCC)    Dyslipidemia    JERO (obstructive sleep apnea)    Myasthenia gravis (HCC)    History of malignant carcinoid tumor of rectum    Cystitis glandularis    Hypertensive heart disease without heart failure    Pain in both lower extremities    Annual physical exam    Class 1 obesity due to excess calories with serious comorbidity and body mass index (BMI) of 32 0 to 32 9 in adult    Umbilical hernia without obstruction and without gangrene    Encounter to discuss test results    Benign prostatic hyperplasia without lower urinary tract symptoms    Gallstones     Past Medical History:   Diagnosis Date    Carcinoid tumor of colon     Colon polyp     CPAP (continuous positive airway pressure) dependence     Hyperlipidemia     [facility] )      Social History     Socioeconomic History    Marital status: /Civil Union     Spouse name: Not on file    Number of children: Not on file    Years of education: Not on file    Highest education level: Not on file   Occupational History    Not on file   Tobacco Use    Smoking status: Never Smoker    Smokeless tobacco: Never Used   Vaping Use    Vaping Use: Never used   Substance and Sexual Activity    Alcohol use:  Yes     Alcohol/week: 1 0 standard drink     Types: 1 Glasses of wine per week     Comment: 1 per week    Drug use: Never    Sexual activity: Yes     Partners: Female     Birth control/protection: None   Other Topics Concern    Not on file   Social History Narrative    Most recent tobacco use screenin2019           Do you currently or have you served in the [employer]:   No          Were you activated, into active duty, as a member of the [employer] or as a Reservist:   No           Advance directive:   No          Chewing tobacco:   none          Alcohol intake: Moderate          1 a week          Marital status:             Live alone or with others:   with others           Exercise level:   Occasional          General stress level:   Medium          Diet:   Regular          Caffeine intake: Moderate           Guns present in home:   Yes           Seat belts used routinely:   Yes           Sunscreen used routinely:   Yes           Illicit drugs:   no          Smoke alarm in home:   Yes          Are there stairs in your home:   Yes           Pets:   Yes      4-cats     Per linda     Social Determinants of Health     Financial Resource Strain: Not on file   Food Insecurity: Not on file   Transportation Needs: Not on file   Physical Activity: Not on file   Stress: Not on file   Social Connections: Not on file   Intimate Partner Violence: Not on file   Housing Stability: Not on file      Family History   Problem Relation Age of Onset    Diabetes Mother     Heart disease Father     Heart failure Father     Prostate cancer Father     No Known Problems Sister      Past Surgical History:   Procedure Laterality Date    BACK SURGERY  2001    lump removed     COLONOSCOPY      12,2016 tumor removed     DENTAL SURGERY      FL RETROGRADE PYELOGRAM  2020    NASAL POLYP EXCISION      AL COLONOSCOPY FLX DX W/COLLJ SPEC WHEN PFRMD N/A 2016    Procedure: COLONOSCOPY;  Surgeon: Scott Wood MD;  [facility];   Service: Gastroenterology    UTE Maynard <0 5 CM LESN N/A 6/25/2020    Procedure: TRANSURETHRAL RESECTION OF BLADDER TUMOR (TURBT); Surgeon: Rambo Patton MD;  [facility];  Service: Urology    KY CYSTOURETHROSCOPY,URETER CATHETER Bilateral 6/25/2020    Procedure: CYSTOSCOPY WITH RETROGRADE PYELOGRAM;  Surgeon: Rambo Patton MD;  [facility];  Service: Urology    KY REPAIR UMBILICAL ZQNM,0+D/W,KNNFW N/A 7/8/2021    Procedure: REPAIR HERNIA UMBILICAL WITH MESH;  Surgeon: Lisbet Patel MD;  Location:  MAIN OR;  Service: General    TRANSURETHRAL RESECTION OF PROSTATE         Current Outpatient Medications:     Accu-Chek Danielle Plus test strip, TEST BLOOD SUGARS ONCE DAILY, Disp: , Rfl:     amLODIPine (NORVASC) 10 mg tablet, TAKE 1 TABLET EVERY DAY, Disp: 90 tablet, Rfl: 2    Ascorbic Acid (VITAMIN C) 500 MG CAPS, Take 1 tablet by mouth daily, Disp: , Rfl:     aspirin 81 MG tablet, Take 81 mg by mouth daily  , Disp: , Rfl:     atorvastatin (LIPITOR) 40 mg tablet, Take 40 mg by mouth daily at bedtime, Disp: , Rfl: 4    B Complex Vitamins (VITAMIN B COMPLEX PO), Take by mouth, Disp: , Rfl:     B-D UF III MINI PEN NEEDLES 31G X 5 MM MISC, USE 1 DAILY, Disp: , Rfl: 5    Basaglar KwikPen 100 units/mL injection pen, Inject 60 Units under the skin daily , Disp: , Rfl:     BD Pen Needle Nelsy U/F 32G X 4 MM MISC, 2 (two) times a day Test, Disp: , Rfl:     Cholecalciferol (CVS VITAMIN D) 2000 units CAPS, Take 2 tablets by mouth 2 (two) times a day, Disp: , Rfl:     Cinnamon 500 MG TABS, Take by mouth 2 (two) times a day , Disp: , Rfl:     coenzyme Q-10 100 MG capsule, Take 2 capsules by mouth daily, Disp: , Rfl:     Cyanocobalamin (Vitamin B-12) 2500 MCG SUBL, Place under the tongue, Disp: , Rfl:     Ferrous Sulfate (Iron) 325 (65 Fe) MG TABS, Take by mouth 2 (two) times a day, Disp: , Rfl:     GARLIC PO, Take by mouth, Disp: , Rfl:     glipiZIDE (GLUCOTROL XL) 10 mg 24 hr tablet, Take 5 mg by mouth daily , Disp: , Rfl:    JANUVIA 100 MG tablet, Take 100 mg by mouth daily, Disp: , Rfl: 2    Jardiance 10 MG TABS, 10 mg daily , Disp: , Rfl:     loratadine (CLARITIN) 10 mg tablet, Take 10 mg by mouth daily, Disp: , Rfl:     metFORMIN (GLUCOPHAGE-XR) 500 mg 24 hr tablet, Take 1,000 mg by mouth 2 (two) times a day, Disp: , Rfl:     metoprolol succinate (TOPROL-XL) 100 mg 24 hr tablet, TAKE 1 TABLET BY MOUTH DAILY, Disp: 90 tablet, Rfl: 3    Multiple Vitamin (MULTI-VITAMIN DAILY) TABS, Take 1 tablet by mouth daily, Disp: , Rfl:     olmesartan (BENICAR) 40 mg tablet, TAKE 1 TABLET BY MOUTH EVERY DAY, Disp: 90 tablet, Rfl: 2    pyridostigmine (MESTINON) 60 mg tablet, Take 60 mg by mouth 3 (three) times a day , Disp: , Rfl:     triamterene-hydrochlorothiazide (MAXZIDE-25) 37 5-25 mg per tablet, Take 1 tablet by mouth daily, Disp: 90 tablet, Rfl: 3    TURMERIC PO, Take by mouth, Disp: , Rfl:     VASCEPA 1 g CAPS, TAKE 1 CAPSULE BY MOUTH TWICE A DAY WITH MEALS, Disp: , Rfl: 2    zinc gluconate 50 mg tablet, Take 50 mg by mouth daily, Disp: , Rfl:   No Known Allergies  Vitals:    01/10/22 0832   BP: 134/68   BP Location: Left arm   Patient Position: Sitting   Cuff Size: Large   Pulse: 84   SpO2: 98%   Weight: 93 2 kg (205 lb 6 4 oz)   Height: 5' 6" (1 676 m)       Labs:  Lab Results   Component Value Date    K 3 4 (L) 10/18/2021    K 3 7 02/24/2020     10/18/2021     02/24/2020    CO2 32 10/18/2021    CO2 24 02/24/2020    BUN 13 10/18/2021    BUN 13 02/24/2020    CREATININE 1 12 10/18/2021    CALCIUM 9 6 10/18/2021       Lab Results   Component Value Date    WBC 4 92 10/18/2021    HGB 14 8 10/18/2021    HCT 47 5 10/18/2021    MCV 85 10/18/2021     10/18/2021     Imaging:    ECHO (3/2021):  LEFT VENTRICLE:  Systolic function was normal  Ejection fraction was estimated to be 60 %  There were no regional wall motion abnormalities    Doppler parameters were consistent with abnormal left ventricular relaxation (grade 1 diastolic dysfunction)      RIGHT VENTRICLE:  Systolic function was normal      MITRAL VALVE:  There was trace regurgitation      AORTIC VALVE:  There was no evidence for stenosis      TRICUSPID VALVE:  There was mild regurgitation  Pulmonary artery systolic pressure was within the normal range          Review of Systems:  Review of Systems   Constitutional: Negative  HENT: Negative  Eyes: Negative  Respiratory: Negative  Cardiovascular: Negative  Gastrointestinal: Negative  Musculoskeletal: Negative  Skin: Negative  Allergic/Immunologic: Negative  Neurological: Negative  Hematological: Negative  Psychiatric/Behavioral: Negative  All other systems reviewed and are negative  Vitals:    01/10/22 0832   BP: 134/68   BP Location: Left arm   Patient Position: Sitting   Cuff Size: Large   Pulse: 84   SpO2: 98%   Weight: 93 2 kg (205 lb 6 4 oz)   Height: 5' 6" (1 676 m)     Physical Exam:  Physical Exam  Constitutional:       Appearance: He is well-developed  HENT:      Head: Normocephalic and atraumatic  Eyes:      General: No scleral icterus  Right eye: No discharge  Left eye: No discharge  Pupils: Pupils are equal, round, and reactive to light  Neck:      Thyroid: No thyromegaly  Vascular: No JVD  Cardiovascular:      Rate and Rhythm: Normal rate and regular rhythm  No extrasystoles are present  Pulses: Normal pulses  No decreased pulses  Heart sounds: Normal heart sounds, S1 normal and S2 normal  No murmur heard  No friction rub  No gallop  Pulmonary:      Effort: Pulmonary effort is normal  No respiratory distress  Breath sounds: Normal breath sounds  No wheezing or rales  Abdominal:      General: Bowel sounds are normal  There is no distension  Palpations: Abdomen is soft  Tenderness: There is no abdominal tenderness  Musculoskeletal:         General: No tenderness or deformity  Normal range of motion  Cervical back: Normal range of motion and neck supple  Skin:     General: Skin is warm and dry  Findings: No rash  Neurological:      Mental Status: He is alert and oriented to person, place, and time  Cranial Nerves: No cranial nerve deficit  Psychiatric:         Thought Content: Thought content normal          Judgment: Judgment normal      Counseling / Coordination of Care  Total office time spent today 40 minutes  Greater than 50% of total time was spent with the patient and / or family counseling and / or coordination of care

## 2022-01-10 NOTE — PATIENT INSTRUCTIONS
Low-Sodium Diet   AMBULATORY CARE:   A low-sodium diet  limits foods that are high in sodium (salt)  You will need to follow a low-sodium diet if you have high blood pressure, kidney disease, or heart failure  You may also need to follow this diet if you have a condition that is causing your body to retain (hold) extra fluid  You may need to limit the amount of sodium you eat in a day to 1,500 to 2,000 mg  Ask your healthcare provider how much sodium you can have each day  How to use food labels to choose foods that are low in sodium:  Read food labels to find the amount of sodium they contain  The amount of sodium is listed in milligrams (mg)  The % Daily Value (DV) column tells you how much of your daily needs are met by 1 serving of the food for each nutrient listed  Choose foods that have less than 5% of the DV of sodium  These foods are considered low in sodium  Foods that have 20% or more of the DV of sodium are considered high in sodium  Some food labels may also list any of the following terms that tell you about the sodium content in the food:  · Sodium-free:  Less than 5 mg in each serving    · Very low sodium:  35 mg of sodium or less in each serving    · Low sodium:  140 mg of sodium or less in each serving    · Reduced sodium: At least 25% less sodium in each serving than the regular type    · Light in sodium:  50% less sodium in each serving    · Unsalted or no added salt:  No extra salt is added during processing (the food may still contain sodium)       Foods to avoid:  Salty foods are high in sodium  You should avoid the following:  · Processed foods:      ? Mixes for cornbread, biscuits, cake, and pudding     ? Instant foods, such as potatoes, cereals, noodles, and rice     ? Packaged foods, such as bread stuffing, rice and pasta mixes, snack dip mixes, and macaroni and cheese     ? Canned foods, such as canned vegetables, soups, broths, sauces, and vegetable or tomato juice    ?  Snack foods, such as salted chips, popcorn, pretzels, pork rinds, salted crackers, and salted nuts    ? Frozen foods, such as dinners, entrees, vegetables with sauces, and breaded meats    ? Sauerkraut, pickled vegetables, and other foods prepared in brine    · Meats and cheeses:      ? Smoked or cured meat, such as corned beef, omalley, ham, hot dogs, and sausage    ? Canned meats or spreads, such as potted meats, sardines, anchovies, and imitation seafood    ? Deli or lunch meats, such as bologna, ham, turkey, and roast beef    ? Processed cheese, such as American cheese and cheese spreads    · Condiments, sauces, and seasonings:      ? Salt (¼ teaspoon of salt contains 575 mg of sodium)    ? Seasonings made with salt, such as garlic salt, celery salt, onion salt, and seasoned salt    ? Regular soy sauce, barbecue sauce, teriyaki sauce, steak sauce, Worcestershire sauce, and most flavored vinegars    ? Canned gravy and mixes     ? Regular condiments, such as mustard, ketchup, and salad dressings    ? Pickles and olives    ? Meat tenderizers and monosodium glutamate (MSG)    Foods to include:  Read the food label to find the exact amount of sodium in each serving  · Bread and cereal:  Try to choose breads with less than 80 mg of sodium per serving  ? Bread, roll, sandy, tortilla, or unsalted crackers  ? Ready-to-eat cereals with less than 5% DV of sodium (examples include shredded wheat and puffed rice)    ? Pasta    · Vegetables and fruits:      ? Unsalted fresh, frozen, or canned vegetables    ? Fresh, frozen, or canned fruits    ? Fruit juice    · Dairy:  One serving has about 150 mg of sodium  ? Milk, all types    ? Yogurt    ? Hard cheese, such as cheddar, Swiss, [employer], or mozzarella    · Meat and other protein foods:  Some raw meats may have added sodium  ? Plain meats, fish, and poultry     ? Eggs    · Other foods:      ? Homemade pudding    ? Unsalted nuts, popcorn, or pretzels    ?  Unsalted butter or margarine    Ways to decrease sodium:   · Add spices and herbs to foods instead of salt during cooking  Use salt-free seasonings to add flavor to foods  Examples include onion powder, garlic powder, basil, hickey powder, paprika, and parsley  Try lemon or lime juice or vinegar to give foods a tart flavor  Use hot peppers, pepper, or cayenne pepper to add a spicy flavor  · Do not keep a salt shaker at your kitchen table  This may help keep you from adding salt to food at the table  A teaspoon of salt has 2,300 mg of sodium  It may take time to get used to enjoying the natural flavor of food instead of adding salt  Talk to your healthcare provider before you use salt substitutes  Some salt substitutes have a high amount of potassium and need to be avoided if you have kidney disease  · Choose low-sodium foods at restaurants  Meals from restaurants are often high in sodium  Some restaurants have nutrition information on the menu that tells you the amount of sodium in their foods  If possible, ask for your food to be prepared with less, or no salt  · Shop for unsalted or low-sodium foods and snacks at the grocery store  Examples include unsalted or low-sodium broths, soups, and canned vegetables  Choose fresh or frozen vegetables instead  Choose unsalted nuts or seeds or fresh fruits or vegetables as snacks  Read food labels and choose salt-free, very low-sodium, or low-sodium foods  © Copyright [employer] 2021 Information is for End User's use only and may not be sold, redistributed or otherwise used for commercial purposes  All illustrations and images included in CareNotes® are the copyrighted property of [employer] M [employer]  or Eulalia Burnett   The above information is an  only  It is not intended as medical advice for individual conditions or treatments  Talk to your doctor, nurse or pharmacist before following any medical regimen to see if it is safe and effective for you

## 2022-02-15 ENCOUNTER — APPOINTMENT (OUTPATIENT)
Dept: LAB | Facility: AMBULARY SURGERY CENTER | Age: 62
End: 2022-02-15
Payer: COMMERCIAL

## 2022-02-15 DIAGNOSIS — E04.1 NONTOXIC UNINODULAR GOITER: ICD-10-CM

## 2022-02-15 DIAGNOSIS — E78.5 HYPERLIPOPROTEINEMIA: ICD-10-CM

## 2022-02-15 DIAGNOSIS — E11.00 TYPE II DIABETES MELLITUS WITH HYPEROSMOLARITY, UNCONTROLLED (HCC): ICD-10-CM

## 2022-02-15 DIAGNOSIS — I10 ESSENTIAL HYPERTENSION, MALIGNANT: ICD-10-CM

## 2022-02-15 DIAGNOSIS — E11.65 TYPE II DIABETES MELLITUS WITH HYPEROSMOLARITY, UNCONTROLLED (HCC): ICD-10-CM

## 2022-02-15 DIAGNOSIS — E55.9 AVITAMINOSIS D: ICD-10-CM

## 2022-02-15 LAB
25(OH)D3 SERPL-MCNC: 36.6 NG/ML (ref 30–100)
ANION GAP SERPL CALCULATED.3IONS-SCNC: 5 MMOL/L (ref 4–13)
BUN SERPL-MCNC: 14 MG/DL (ref 5–25)
CALCIUM SERPL-MCNC: 9.4 MG/DL (ref 8.3–10.1)
CHLORIDE SERPL-SCNC: 107 MMOL/L (ref 100–108)
CHOLEST SERPL-MCNC: 125 MG/DL
CO2 SERPL-SCNC: 32 MMOL/L (ref 21–32)
CREAT SERPL-MCNC: 1.18 MG/DL (ref 0.6–1.3)
EST. AVERAGE GLUCOSE BLD GHB EST-MCNC: 131 MG/DL
GFR SERPL CREATININE-BSD FRML MDRD: 66 ML/MIN/1.73SQ M
GLUCOSE P FAST SERPL-MCNC: 87 MG/DL (ref 65–99)
HBA1C MFR BLD: 6.2 %
HDLC SERPL-MCNC: 44 MG/DL
LDLC SERPL CALC-MCNC: 55 MG/DL (ref 0–100)
NONHDLC SERPL-MCNC: 81 MG/DL
POTASSIUM SERPL-SCNC: 3.8 MMOL/L (ref 3.5–5.3)
SODIUM SERPL-SCNC: 144 MMOL/L (ref 136–145)
TRIGL SERPL-MCNC: 130 MG/DL
TSH SERPL DL<=0.05 MIU/L-ACNC: 1.08 UIU/ML (ref 0.36–3.74)

## 2022-02-15 PROCEDURE — 84443 ASSAY THYROID STIM HORMONE: CPT

## 2022-02-15 PROCEDURE — 82306 VITAMIN D 25 HYDROXY: CPT

## 2022-02-15 PROCEDURE — 80048 BASIC METABOLIC PNL TOTAL CA: CPT

## 2022-02-15 PROCEDURE — 36415 COLL VENOUS BLD VENIPUNCTURE: CPT

## 2022-02-15 PROCEDURE — 3044F HG A1C LEVEL LT 7.0%: CPT | Performed by: INTERNAL MEDICINE

## 2022-02-15 PROCEDURE — 80061 LIPID PANEL: CPT

## 2022-02-15 PROCEDURE — 83036 HEMOGLOBIN GLYCOSYLATED A1C: CPT

## 2022-03-14 RX ORDER — ICOSAPENT ETHYL 1000 MG/1
CAPSULE ORAL
COMMUNITY

## 2022-03-15 ENCOUNTER — OFFICE VISIT (OUTPATIENT)
Dept: FAMILY MEDICINE CLINIC | Facility: CLINIC | Age: 62
End: 2022-03-15
Payer: COMMERCIAL

## 2022-03-15 VITALS
HEART RATE: 76 BPM | WEIGHT: 202 LBS | TEMPERATURE: 97.1 F | HEIGHT: 66 IN | SYSTOLIC BLOOD PRESSURE: 120 MMHG | OXYGEN SATURATION: 98 % | DIASTOLIC BLOOD PRESSURE: 78 MMHG | RESPIRATION RATE: 16 BRPM | BODY MASS INDEX: 32.47 KG/M2

## 2022-03-15 DIAGNOSIS — E11.9 DIABETES MELLITUS WITHOUT COMPLICATION (HCC): ICD-10-CM

## 2022-03-15 DIAGNOSIS — G47.33 OSA (OBSTRUCTIVE SLEEP APNEA): ICD-10-CM

## 2022-03-15 DIAGNOSIS — E78.5 DYSLIPIDEMIA: ICD-10-CM

## 2022-03-15 DIAGNOSIS — Z12.5 PROSTATE CANCER SCREENING: Primary | ICD-10-CM

## 2022-03-15 DIAGNOSIS — K80.20 GALLSTONES: ICD-10-CM

## 2022-03-15 DIAGNOSIS — I10 ESSENTIAL HYPERTENSION: ICD-10-CM

## 2022-03-15 DIAGNOSIS — N40.0 BENIGN PROSTATIC HYPERPLASIA WITHOUT LOWER URINARY TRACT SYMPTOMS: ICD-10-CM

## 2022-03-15 DIAGNOSIS — G70.00 MYASTHENIA GRAVIS (HCC): ICD-10-CM

## 2022-03-15 PROBLEM — Z71.2 ENCOUNTER TO DISCUSS TEST RESULTS: Status: RESOLVED | Noted: 2021-07-19 | Resolved: 2022-03-15

## 2022-03-15 PROCEDURE — 1036F TOBACCO NON-USER: CPT | Performed by: FAMILY MEDICINE

## 2022-03-15 PROCEDURE — 3074F SYST BP LT 130 MM HG: CPT | Performed by: FAMILY MEDICINE

## 2022-03-15 PROCEDURE — 3008F BODY MASS INDEX DOCD: CPT | Performed by: FAMILY MEDICINE

## 2022-03-15 PROCEDURE — 99214 OFFICE O/P EST MOD 30 MIN: CPT | Performed by: FAMILY MEDICINE

## 2022-03-15 PROCEDURE — 3078F DIAST BP <80 MM HG: CPT | Performed by: FAMILY MEDICINE

## 2022-03-15 RX ORDER — LINAGLIPTIN 5 MG/1
5 TABLET, FILM COATED ORAL DAILY
COMMUNITY

## 2022-03-15 RX ORDER — AMLODIPINE BESYLATE 10 MG/1
TABLET ORAL
Qty: 90 TABLET | Refills: 2 | Status: SHIPPED | OUTPATIENT
Start: 2022-03-15

## 2022-03-15 NOTE — PROGRESS NOTES
Assessment/Plan:         Problem List Items Addressed This Visit        Digestive    Gallstones     asymtomatic            Endocrine    Diabetes mellitus without complication ([facility] [address] )       Lab Results   Component Value Date    HGBA1C 6 2 (H) 02/15/2022   excellent control         Relevant Medications    linaGLIPtin (Tradjenta) 5 MG TABS       Respiratory    JERO (obstructive sleep apnea)     Doing ok on cpap            Cardiovascular and Mediastinum    Essential hypertension     Well controlled on current therapy continue with current medications and will reassess next visit              Nervous and Auditory    Myasthenia gravis ([facility] [address] )     Shanice Saroj on meds sees neuro            Genitourinary    Benign prostatic hyperplasia without lower urinary tract symptoms     Ok had cystoscopy due for pSA            Other    Dyslipidemia     Labs ok           Other Visit Diagnoses     Prostate cancer screening    -  Primary    Relevant Orders    PSA, Total Screen            Subjective: Pt is here for interval visit and evaluation of multiple medical problems, review of medications, labs, Health Maintenance and any recent specialty consults       Patient ID: Meredith Harding is a 64 y o  male  HPI    The following portions of the patient's history were reviewed and updated as appropriate:   Past Medical History:  He has a past medical history of Carcinoid tumor of colon, Colon polyp, CPAP (continuous positive airway pressure) dependence, Hyperlipidemia, and Myasthenia gravis ([facility] [address] )  ,  _______________________________________________________________________  Medical Problems:  does not have any pertinent problems on file ,  _______________________________________________________________________  Past Surgical History:   has a past surgical history that includes Dental surgery; Nasal polyp excision; pr colonoscopy flx dx w/collj spec when pfrmd (N/A, 7/19/2016); Back surgery (01/01/2001);  Colonoscopy; [place] retrograde pyelogram (6/25/2020); pr cystourethroscopy,fulgur <0 5 cm lesn (N/A, 6/25/2020); pr cystourethroscopy,ureter catheter (Bilateral, 6/25/2020); Transurethral resection of prostate; and pr repair umbilical BWXB,4+Y/G,THFQQ (N/A, 7/8/2021)  ,  _______________________________________________________________________  Family History:  family history includes Diabetes in his mother; Heart disease in his father; Heart failure in his father; No Known Problems in his sister; Prostate cancer in his father ,  _______________________________________________________________________  Social History:   reports that he has never smoked  He has never used smokeless tobacco  He reports current alcohol use of about 1 0 standard drink of alcohol per week  He reports that he does not use drugs  ,  _______________________________________________________________________  Allergies:  has No Known Allergies     _______________________________________________________________________  Current Outpatient Medications   Medication Sig Dispense Refill    Accu-Chek Danielle Plus test strip TEST BLOOD SUGARS ONCE DAILY      amLODIPine (NORVASC) 10 mg tablet TAKE 1 TABLET EVERY DAY 90 tablet 2    Ascorbic Acid (VITAMIN C) 500 MG CAPS Take 1 tablet by mouth daily      aspirin 81 MG tablet Take 81 mg by mouth daily        atorvastatin (LIPITOR) 40 mg tablet Take 40 mg by mouth daily at bedtime  4    B Complex Vitamins (VITAMIN B COMPLEX PO) Take by mouth      B-D UF III MINI PEN NEEDLES 31G X 5 MM MISC USE 1 DAILY  5    Basaglar KwikPen 100 units/mL injection pen Inject 60 Units under the skin daily       BD Pen Needle Nelsy U/F 32G X 4 MM MISC 2 (two) times a day Test      Cholecalciferol ([employer] VITAMIN D) 2000 units CAPS Take 2 tablets by mouth 2 (two) times a day      Cinnamon 500 MG TABS Take by mouth 2 (two) times a day       coenzyme Q-10 100 MG capsule Take 2 capsules by mouth daily      Cyanocobalamin (Vitamin B-12) 2500 MCG SUBL Place under the tongue      Ferrous Sulfate (Iron) 325 (65 Fe) MG TABS Take by mouth 2 (two) times a day      GARLIC PO Take by mouth      glipiZIDE (GLUCOTROL XL) 10 mg 24 hr tablet Take 5 mg by mouth daily       Icosapent Ethyl (Vascepa) 1 g CAPS       JANUVIA 100 MG tablet Take 100 mg by mouth daily  2    Jardiance 10 MG TABS 10 mg daily       linaGLIPtin (Tradjenta) 5 MG TABS Take 5 mg by mouth daily      loratadine (CLARITIN) 10 mg tablet Take 10 mg by mouth daily      metFORMIN (GLUCOPHAGE-XR) 500 mg 24 hr tablet Take 1,000 mg by mouth 2 (two) times a day      metoprolol succinate (TOPROL-XL) 100 mg 24 hr tablet TAKE 1 TABLET BY MOUTH DAILY 90 tablet 3    Multiple Vitamin (MULTI-VITAMIN DAILY) TABS Take 1 tablet by mouth daily      olmesartan (BENICAR) 40 mg tablet TAKE 1 TABLET BY MOUTH EVERY DAY 90 tablet 2    pyridostigmine (MESTINON) 60 mg tablet Take 60 mg by mouth 3 (three) times a day   triamterene-hydrochlorothiazide (MAXZIDE-25) 37 5-25 mg per tablet Take 1 tablet by mouth daily 90 tablet 3    TURMERIC PO Take by mouth      zinc gluconate 50 mg tablet Take 50 mg by mouth daily      VASCEPA 1 g CAPS TAKE 1 CAPSULE BY MOUTH TWICE A DAY WITH MEALS  2     No current facility-administered medications for this visit      _______________________________________________________________________  Review of Systems   Constitutional: Negative for appetite change, chills, fatigue and fever  Respiratory: Negative for cough, chest tightness and shortness of breath  Cardiovascular: Negative for chest pain, palpitations and leg swelling  Gastrointestinal: Negative for abdominal pain, constipation, diarrhea, nausea and vomiting  Genitourinary: Negative for difficulty urinating and frequency  Musculoskeletal: Negative for arthralgias, back pain and neck pain  Skin: Negative for rash  Neurological: Negative for dizziness, weakness, light-headedness, numbness and headaches     Hematological: Does not bruise/bleed easily  Psychiatric/Behavioral: Negative for dysphoric mood and sleep disturbance  The patient is not nervous/anxious  Objective:  Vitals:    03/15/22 1034   BP: 120/78   BP Location: Left arm   Patient Position: Sitting   Cuff Size: Standard   Pulse: 76   Resp: 16   Temp: (!) 97 1 °F (36 2 °C)   TempSrc: Temporal   SpO2: 98%   Weight: 91 6 kg (202 lb)   Height: 5' 6" (1 676 m)     Body mass index is 32 6 kg/m²  Physical Exam  Vitals reviewed  Constitutional:       General: He is not in acute distress  Appearance: Normal appearance  He is well-developed  He is obese  He is not ill-appearing  HENT:      Mouth/Throat:      Mouth: Mucous membranes are moist    Eyes:      Extraocular Movements: Extraocular movements intact  Conjunctiva/sclera: Conjunctivae normal       Pupils: Pupils are equal, round, and reactive to light  Neck:      Thyroid: No thyromegaly  Vascular: No carotid bruit  Cardiovascular:      Rate and Rhythm: Normal rate and regular rhythm  Pulses: Normal pulses  no weak pulses          Dorsalis pedis pulses are 2+ on the right side and 2+ on the left side  Heart sounds: Normal heart sounds  No murmur heard  Pulmonary:      Effort: Pulmonary effort is normal       Breath sounds: Normal breath sounds  Chest:      Chest wall: No tenderness  Abdominal:      General: Bowel sounds are normal  There is no distension  Palpations: Abdomen is soft  Tenderness: There is no abdominal tenderness  Musculoskeletal:      Cervical back: Normal range of motion and neck supple  Feet:      Right foot:      Skin integrity: Dry skin present  No ulcer, skin breakdown, erythema, warmth or callus  Left foot:      Skin integrity: Dry skin present  No ulcer, skin breakdown, erythema, warmth or callus  Lymphadenopathy:      Cervical: No cervical adenopathy  Skin:     General: Skin is warm and dry  Neurological:      General: No focal deficit present  Mental Status: He is alert and oriented to person, place, and time  Mental status is at baseline  Cranial Nerves: No cranial nerve deficit  Psychiatric:         Mood and Affect: Mood normal          Behavior: Behavior normal      Patient's shoes and socks removed  Right Foot/Ankle   Right Foot Inspection  Skin Exam: skin normal, skin intact and dry skin  No warmth, no callus, no erythema, no maceration, no abnormal color, no pre-ulcer, no ulcer and no callus  Toe Exam: No swelling, no tenderness, erythema and  no right toe deformity    Sensory   Vibration: intact  Proprioception: intact  Monofilament testing: intact    Vascular  The right DP pulse is 2+  Right Toe  - Comprehensive Exam  Ecchymosis: none  Swelling: none   Tenderness: none         Left Foot/Ankle  Left Foot Inspection  Skin Exam: skin normal, skin intact and dry skin  No warmth, no erythema, no maceration, normal color, no pre-ulcer, no ulcer and no callus  Sensory   Vibration: intact  Proprioception: intact  Monofilament testing: intact    Vascular  The left DP pulse is 2+  Left Toe  - Comprehensive Exam  Ecchymosis: none  Swelling: none   Tenderness: none           Assign Risk Category  No deformity present  No loss of protective sensation  No weak pulses  Risk: 0    BMI Counseling: Body mass index is 32 6 kg/m²  The BMI is above normal  Nutrition recommendations include 3-5 servings of fruits/vegetables daily, reducing fast food intake, consuming healthier snacks, decreasing soda and/or juice intake, moderation in carbohydrate intake and increasing intake of lean protein  Exercise recommendations include exercising 3-5 times per week and strength training exercises

## 2022-05-24 DIAGNOSIS — I10 ESSENTIAL HYPERTENSION: ICD-10-CM

## 2022-05-24 RX ORDER — TRIAMTERENE AND HYDROCHLOROTHIAZIDE 37.5; 25 MG/1; MG/1
TABLET ORAL
Qty: 90 TABLET | Refills: 3 | Status: SHIPPED | OUTPATIENT
Start: 2022-05-24

## 2022-06-28 ENCOUNTER — APPOINTMENT (OUTPATIENT)
Dept: LAB | Facility: AMBULARY SURGERY CENTER | Age: 62
End: 2022-06-28
Payer: COMMERCIAL

## 2022-06-28 DIAGNOSIS — Z12.5 PROSTATE CANCER SCREENING: ICD-10-CM

## 2022-06-28 LAB — PSA SERPL-MCNC: 2.2 NG/ML (ref 0–4)

## 2022-06-28 PROCEDURE — 36415 COLL VENOUS BLD VENIPUNCTURE: CPT

## 2022-06-28 PROCEDURE — G0103 PSA SCREENING: HCPCS

## 2022-07-05 ENCOUNTER — TELEPHONE (OUTPATIENT)
Dept: ADMINISTRATIVE | Facility: OTHER | Age: 62
End: 2022-07-05

## 2022-07-05 ENCOUNTER — OFFICE VISIT (OUTPATIENT)
Dept: FAMILY MEDICINE CLINIC | Facility: CLINIC | Age: 62
End: 2022-07-05
Payer: COMMERCIAL

## 2022-07-05 VITALS
TEMPERATURE: 98 F | BODY MASS INDEX: 32.14 KG/M2 | SYSTOLIC BLOOD PRESSURE: 136 MMHG | HEART RATE: 90 BPM | OXYGEN SATURATION: 96 % | WEIGHT: 200 LBS | HEIGHT: 66 IN | RESPIRATION RATE: 16 BRPM | DIASTOLIC BLOOD PRESSURE: 68 MMHG

## 2022-07-05 DIAGNOSIS — K80.20 GALLSTONES: ICD-10-CM

## 2022-07-05 DIAGNOSIS — G70.00 MYASTHENIA GRAVIS (HCC): ICD-10-CM

## 2022-07-05 DIAGNOSIS — I10 ESSENTIAL HYPERTENSION: ICD-10-CM

## 2022-07-05 DIAGNOSIS — Z00.00 ANNUAL PHYSICAL EXAM: ICD-10-CM

## 2022-07-05 DIAGNOSIS — E66.09 CLASS 1 OBESITY DUE TO EXCESS CALORIES WITH SERIOUS COMORBIDITY AND BODY MASS INDEX (BMI) OF 32.0 TO 32.9 IN ADULT: ICD-10-CM

## 2022-07-05 DIAGNOSIS — N40.0 BENIGN PROSTATIC HYPERPLASIA WITHOUT LOWER URINARY TRACT SYMPTOMS: Primary | ICD-10-CM

## 2022-07-05 DIAGNOSIS — E78.5 DYSLIPIDEMIA: ICD-10-CM

## 2022-07-05 DIAGNOSIS — M51.26 LUMBAR DISC HERNIATION: ICD-10-CM

## 2022-07-05 DIAGNOSIS — E11.9 DIABETES MELLITUS WITHOUT COMPLICATION (HCC): ICD-10-CM

## 2022-07-05 DIAGNOSIS — Z85.040 HISTORY OF MALIGNANT CARCINOID TUMOR OF RECTUM: ICD-10-CM

## 2022-07-05 DIAGNOSIS — M54.50 ACUTE BILATERAL LOW BACK PAIN WITHOUT SCIATICA: ICD-10-CM

## 2022-07-05 PROCEDURE — 99396 PREV VISIT EST AGE 40-64: CPT | Performed by: FAMILY MEDICINE

## 2022-07-05 PROCEDURE — 99214 OFFICE O/P EST MOD 30 MIN: CPT | Performed by: FAMILY MEDICINE

## 2022-07-05 PROCEDURE — 3078F DIAST BP <80 MM HG: CPT | Performed by: FAMILY MEDICINE

## 2022-07-05 PROCEDURE — 3075F SYST BP GE 130 - 139MM HG: CPT | Performed by: FAMILY MEDICINE

## 2022-07-05 PROCEDURE — 3725F SCREEN DEPRESSION PERFORMED: CPT | Performed by: FAMILY MEDICINE

## 2022-07-05 NOTE — TELEPHONE ENCOUNTER
----- Message from Sonu Hadley sent at 7/5/2022 10:17 AM EDT -----  Regarding: care gap request  07/05/22 10:17 AM    Hello, our patient attached above has had Diabetic Eye Exam completed/performed  Please assist in updating the patient chart by making an External outreach to dr Janet Correa facility located in [place]   The date of service is June 2022    Thank you,  Corrine Alfaro  [facility]

## 2022-07-05 NOTE — PROGRESS NOTES
Assessment/Plan:     Today visit interval EM as well as physical  visit      Problem List Items Addressed This Visit        Digestive    Gallstones     asymptomatic              Endocrine    Diabetes mellitus without complication ([facility] [address] )       Lab Results   Component Value Date    HGBA1C 6 2 (H) 02/15/2022   pt had hga1c 2/22 6 4              Cardiovascular and Mediastinum    Essential hypertension     Well controlled on current therapy continue with current medications and will reassess next visit                Nervous and Auditory    Myasthenia gravis ([facility] [address] )     Reviewed note dr Lawson bender on meds              Musculoskeletal and Integument    Lumbar disc herniation     Flare up now  But has not had since 4-5 yrs               Genitourinary    Benign prostatic hyperplasia without lower urinary tract symptoms - Primary     Pt is followed by urology              Other    Dyslipidemia     Ok on meds            History of malignant carcinoid tumor of rectum     Had colonoscopy 2019 due 2024  Discharged fro Dr Cruzito Quach oncology           Annual physical exam     Labs dine and reviewed           Class 1 obesity due to excess calories with serious comorbidity and body mass index (BMI) of 32 0 to 32 9 in adult     Discussion on diet and exercise guidelines for weight loss and  health reviewed with pt              Acute bilateral low back pain without sciatica     Pt with history of L4 disk herniation no flare up  In 4-5 yrs now 2 weeks into pain  mostly on left  side  And slowly improving with stretches  Will order PT if not better in 1 week can schedule           Relevant Orders    Ambulatory Referral to Physical Therapy            Subjective: pt here for annual physical and interval visit has been getting  Low back pain and stiffness for 2 weeks initially both sides now mostly on left  side worsening over past week  Does have L4 disk herniation discovered 4-5 yrs ago with last flare up  Did have PT at that time and has been doing h=ome exercises  with some improvement and using biofreeze no numbness or tingling      Patient ID: Willard Garay is a 58 y o  male  HPI    The following portions of the patient's history were reviewed and updated as appropriate:   Past Medical History:  He has a past medical history of Carcinoid tumor of colon, Colon polyp, CPAP (continuous positive airway pressure) dependence, Hyperlipidemia, and Myasthenia gravis ([address] )  ,  _______________________________________________________________________  Medical Problems:  does not have any pertinent problems on file ,  _______________________________________________________________________  Past Surgical History:   has a past surgical history that includes Dental surgery; Nasal polyp excision; pr colonoscopy flx dx w/collj spec when pfrmd (N/A, 7/19/2016); Back surgery (01/01/2001); Colonoscopy; FL retrograde pyelogram (6/25/2020); pr cystourethroscopy,fulgur <0 5 cm lesn (N/A, 6/25/2020); pr cystourethroscopy,ureter catheter (Bilateral, 6/25/2020); Transurethral resection of prostate; and pr repair umbilical TJJT,4+A/W,HSYHL (N/A, 7/8/2021)  ,  _______________________________________________________________________  Family History:  family history includes Diabetes in his mother; Heart disease in his father; Heart failure in his father; No Known Problems in his sister; Prostate cancer in his father ,  _______________________________________________________________________  Social History:   reports that he has never smoked  He has never used smokeless tobacco  He reports current alcohol use of about 1 0 standard drink of alcohol per week  He reports that he does not use drugs  ,  _______________________________________________________________________  Allergies:  has No Known Allergies     _______________________________________________________________________  Current Outpatient Medications   Medication Sig Dispense Refill    Accu-Chek Danielle Plus test strip TEST BLOOD SUGARS ONCE DAILY      amLODIPine (NORVASC) 10 mg tablet TAKE 1 TABLET EVERY DAY 90 tablet 2    Ascorbic Acid (VITAMIN C) 500 MG CAPS Take 1 tablet by mouth daily      aspirin 81 MG tablet Take 81 mg by mouth daily   atorvastatin (LIPITOR) 40 mg tablet Take 40 mg by mouth daily at bedtime  4    B Complex Vitamins (VITAMIN B COMPLEX PO) Take by mouth      B-D UF III MINI PEN NEEDLES 31G X 5 MM MISC USE 1 DAILY  5    Basaglar KwikPen 100 units/mL injection pen Inject 60 Units under the skin daily       BD Pen Needle Nelsy U/F 32G X 4 MM MISC 2 (two) times a day Test      Cholecalciferol 50 MCG (2000 UT) CAPS Take 2 tablets by mouth 2 (two) times a day      Cinnamon 500 MG TABS Take by mouth 2 (two) times a day       coenzyme Q-10 100 MG capsule Take 2 capsules by mouth daily      Cyanocobalamin (Vitamin B-12) 2500 MCG SUBL Place under the tongue      Ferrous Sulfate (Iron) 325 (65 Fe) MG TABS Take by mouth 2 (two) times a day      GARLIC PO Take by mouth      glipiZIDE (GLUCOTROL XL) 10 mg 24 hr tablet Take 5 mg by mouth daily       Icosapent Ethyl 1 g CAPS       JANUVIA 100 MG tablet Take 100 mg by mouth daily  2    Jardiance 10 MG TABS 10 mg daily       linaGLIPtin (Tradjenta) 5 MG TABS Take 5 mg by mouth daily      loratadine (CLARITIN) 10 mg tablet Take 10 mg by mouth daily      metFORMIN (GLUCOPHAGE-XR) 500 mg 24 hr tablet Take 1,000 mg by mouth 2 (two) times a day      metoprolol succinate (TOPROL-XL) 100 mg 24 hr tablet TAKE 1 TABLET BY MOUTH DAILY 90 tablet 3    Multiple Vitamin (MULTI-VITAMIN DAILY) TABS Take 1 tablet by mouth daily      olmesartan (BENICAR) 40 mg tablet TAKE 1 TABLET BY MOUTH EVERY DAY 90 tablet 2    pyridostigmine (MESTINON) 60 mg tablet Take 60 mg by mouth 3 (three) times a day        triamterene-hydrochlorothiazide (MAXZIDE-25) 37 5-25 mg per tablet TAKE 1 TABLET BY MOUTH EVERY DAY 90 tablet 3    TURMERIC PO Take by mouth      zinc gluconate 50 mg tablet Take 50 mg by mouth daily       No current facility-administered medications for this visit      _______________________________________________________________________  Review of Systems   Constitutional: Negative for appetite change, chills, fatigue and fever  Respiratory: Negative for cough, chest tightness and shortness of breath  Cardiovascular: Negative for chest pain, palpitations and leg swelling  Gastrointestinal: Negative for abdominal pain, constipation, diarrhea, nausea and vomiting  Genitourinary: Negative for difficulty urinating and frequency  Musculoskeletal: Positive for back pain (across low back )  Negative for arthralgias and neck pain  Skin: Negative for rash  Neurological: Negative for dizziness, weakness, light-headedness, numbness and headaches  Hematological: Does not bruise/bleed easily  Psychiatric/Behavioral: Negative for dysphoric mood and sleep disturbance  The patient is not nervous/anxious  Objective:  Vitals:    07/05/22 1004 07/05/22 1020   BP: 150/74 136/68   BP Location: Left arm    Patient Position: Sitting    Cuff Size: Standard    Pulse: 90    Resp: 16    Temp: 98 °F (36 7 °C)    TempSrc: Temporal    SpO2: 96%    Weight: 90 7 kg (200 lb)    Height: 5' 6" (1 676 m)      Body mass index is 32 28 kg/m²  Physical Exam  Vitals reviewed  Constitutional:       General: He is not in acute distress  Appearance: Normal appearance  He is obese  He is not ill-appearing  HENT:      Right Ear: Tympanic membrane, ear canal and external ear normal       Left Ear: Tympanic membrane, ear canal and external ear normal       Nose: Nose normal    Eyes:      General: Lids are normal       Extraocular Movements: Extraocular movements intact  Conjunctiva/sclera: Conjunctivae normal       Pupils: Pupils are equal, round, and reactive to light  Neck:      Thyroid: No thyromegaly  Vascular: No carotid bruit     Cardiovascular:      Rate and Rhythm: Normal rate and regular rhythm  Pulses: Normal pulses  Heart sounds: Normal heart sounds  Pulmonary:      Effort: Pulmonary effort is normal       Breath sounds: Normal breath sounds  Chest:   Breasts:      Right: Normal       Left: Normal        Abdominal:      General: Bowel sounds are normal  There is no distension  Palpations: Abdomen is soft  There is no mass  Tenderness: There is no abdominal tenderness  Hernia: No hernia is present  Comments: Diastasis of abdominus rectus    Genitourinary:     Comments: Followed by urologist  Musculoskeletal:         General: Tenderness (mild tenderness posterior iliac crest  area left side) present  Normal range of motion  Cervical back: Full passive range of motion without pain, normal range of motion and neck supple  Right lower leg: No edema  Left lower leg: No edema  Lymphadenopathy:      Cervical: No cervical adenopathy  Skin:     General: Skin is warm and dry  Comments: No abn moles   Neurological:      General: No focal deficit present  Mental Status: He is alert and oriented to person, place, and time  Mental status is at baseline  Cranial Nerves: No cranial nerve deficit  Sensory: No sensory deficit  Motor: No weakness or tremor        Coordination: Coordination normal       Gait: Gait normal       Deep Tendon Reflexes: Reflexes normal    Psychiatric:         Mood and Affect: Mood normal          Speech: Speech normal          Behavior: Behavior normal

## 2022-07-05 NOTE — ASSESSMENT & PLAN NOTE
Pt with history of L4 disk herniation no flare up  In 4-5 yrs now 2 weeks into pain  mostly on left  side  And slowly improving with stretches  Will order PT if not better in 1 week can schedule

## 2022-07-05 NOTE — LETTER
Diabetic Eye Exam Form    Date Requested: 22  Patient: [facility]  Patient : [id number]   Referring Provider: Tanja Shook MD    DIABETIC Eye Exam Date _______________________________    Type of Exam MUST be documented for Diabetic Eye Exams  Please CHECK ONE  Retinal Exam       Dilated Retinal Exam       OCT       Optomap-Iris Exam      Fundus Photography     Left Eye - Please check Retinopathy AND Type or No Retinopathy      Exam did show retinopathy    Exam did not show retinopathy         Mild     Proliferative           Moderate    Severe            None         Right Eye - Please check Retinopathy AND Type or No Retinopathy     Exam did show retinopathy    Exam did not show retinopathy         Mild     Proliferative        Moderate    Severe        None       Comments __________________________________________________________    Practice Providing Exam ______________________________________________    Exam Performed By (print name) _______________________________________      Provider Signature ___________________________________________________    These reports are needed for  compliance  Please fax this completed form and a copy of the Diabetic Eye Exam report to our office located at [place] [address] as soon as possible via [phone] attention Madalyn: Phone [phone]  We thank you for your assistance in treating our mutual patient

## 2022-07-14 NOTE — TELEPHONE ENCOUNTER
Upon review of the In Basket request we have found as a result of outreach that patient did not have the requested item(s) completed  Patient is scheduled for 9/12/22    Any additional questions or concerns should be emailed to the Practice Liaisons via [email or web address] email, please do not reply via In Basket      Thank you  Segun Davidson MA

## 2022-07-15 DIAGNOSIS — I10 ESSENTIAL HYPERTENSION: ICD-10-CM

## 2022-07-15 RX ORDER — METOPROLOL SUCCINATE 100 MG/1
TABLET, EXTENDED RELEASE ORAL
Qty: 90 TABLET | Refills: 3 | Status: SHIPPED | OUTPATIENT
Start: 2022-07-15

## 2022-08-25 DIAGNOSIS — E11.9 DIABETES MELLITUS WITHOUT COMPLICATION (HCC): Primary | ICD-10-CM

## 2022-08-29 DIAGNOSIS — E11.9 DIABETES MELLITUS WITHOUT COMPLICATION (HCC): Primary | ICD-10-CM

## 2022-08-29 RX ORDER — ICOSAPENT ETHYL 1000 MG/1
CAPSULE ORAL
Qty: 360 CAPSULE | Refills: 1 | Status: SHIPPED | OUTPATIENT
Start: 2022-08-29

## 2022-08-29 RX ORDER — SITAGLIPTIN 100 MG/1
100 TABLET, FILM COATED ORAL DAILY
Qty: 90 TABLET | Refills: 2 | Status: SHIPPED | OUTPATIENT
Start: 2022-08-29 | End: 2022-10-04 | Stop reason: SDUPTHER

## 2022-08-29 RX ORDER — ICOSAPENT ETHYL 1000 MG/1
2 CAPSULE ORAL
Qty: 360 CAPSULE | Refills: 2 | Status: SHIPPED | OUTPATIENT
Start: 2022-08-29

## 2022-09-06 PROBLEM — Z71.2 ENCOUNTER TO DISCUSS TEST RESULTS: Status: ACTIVE | Noted: 2022-09-06

## 2022-09-06 NOTE — PROGRESS NOTES
Problem List Items Addressed This Visit        Genitourinary    Cystitis glandularis - Primary     Surveillance cystoscopy is negative today, no urothelial lesions, small median lobe noted on cystoscopy  Continue annual surveillance cystoscopy given low risk of conversion to adenocarcinoma of the bladder         Benign prostatic hyperplasia without lower urinary tract symptoms     AUA symptom score is 5/2, requires no surgical or additional medical therapies at this time  Continue surveillance of LUTS         Relevant Orders    POCT urine dip (Completed)       Other    Encounter to discuss test results     Real-time review of findings from cystoscopy reviewed with the patient today         Prostate cancer screening encounter, options and risks discussed     PSA 2 2, low risk                   Assessment and plan:       Please see problem oriented charting for the assessment plan of today's urological complaints      Olinda Ovalles MD      Chief Complaint     Chief Complaint   Patient presents with    Cystoscopy         History of Present Illness     lEiana Bolton is a 58 y o  man with a known history of rectal carcinoid tumor, cystoscopy sometime ago showed a lesion within the bladder which was resected which showed some cystitis cystica with glandularis changes  He has been undergoing a surveillance regimen since that time  His last surveillance cystoscopy was 1 year ago  New complaints today include none, some occasional low back pain which is chronic, responds intermittently to physical therapy    The following portions of the patient's history were reviewed and updated as appropriate: allergies, current medications, past family history, past medical history, past social history, past surgical history and problem list       Detailed Urologic History     - please refer to HPI    Review of Systems     Review of Systems   Constitutional: Negative  HENT: Negative  Eyes: Negative  Respiratory: Negative  Cardiovascular: Negative  Gastrointestinal: Negative  Endocrine: Negative  Genitourinary: Negative  Musculoskeletal: Negative  Skin: Negative  Allergic/Immunologic: Negative  Neurological: Negative  Hematological: Negative  Psychiatric/Behavioral: Negative  AUA SYMPTOM SCORE    Flowsheet Row Most Recent Value   AUA SYMPTOM SCORE    How often have you had a sensation of not emptying your bladder completely after you finished urinating? 1 (P)     How often have you had to urinate again less than two hours after you finished urinating? 2 (P)     How often have you found you stopped and started again several times when you urinate? 0 (P)     How often have you found it difficult to postpone urination? 0 (P)     How often have you had a weak urinary stream? 1 (P)     How often have you had to push or strain to begin urination? 0 (P)     How many times did you most typically get up to urinate from the time you went to bed at night until the time you got up in the morning? 1 (P)     Quality of Life: If you were to spend the rest of your life with your urinary condition just the way it is now, how would you feel about that? 2 (P)     AUA SYMPTOM SCORE 5 (P)             Allergies     No Known Allergies    Physical Exam     Physical Exam  Vitals reviewed  Constitutional:       General: He is not in acute distress  Appearance: Normal appearance  He is not ill-appearing, toxic-appearing or diaphoretic  HENT:      Head: Atraumatic  Eyes:      General: No scleral icterus  Right eye: No discharge  Left eye: No discharge  Cardiovascular:      Pulses: Normal pulses  Pulmonary:      Effort: Pulmonary effort is normal    Abdominal:      General: There is no distension  Palpations: There is no mass  Musculoskeletal:         General: No swelling  Skin:     General: Skin is warm  Neurological:      General: No focal deficit present  Mental Status: He is alert and oriented to person, place, and time  Cranial Nerves: No cranial nerve deficit  Psychiatric:         Mood and Affect: Mood normal          Behavior: Behavior normal          Thought Content: Thought content normal          Judgment: Judgment normal              Vital Signs  Vitals:    09/07/22 0936   BP: 150/80   Pulse: 85   SpO2: 98%   Weight: 91 6 kg (202 lb)   Height: 5' 6" (1 676 m)         Current Medications       Current Outpatient Medications:     Accu-Chek Danielle Plus test strip, TEST BLOOD SUGARS ONCE DAILY, Disp: , Rfl:     amLODIPine (NORVASC) 10 mg tablet, TAKE 1 TABLET EVERY DAY, Disp: 90 tablet, Rfl: 2    Ascorbic Acid (VITAMIN C) 500 MG CAPS, Take 1 tablet by mouth daily, Disp: , Rfl:     aspirin 81 MG tablet, Take 81 mg by mouth daily  , Disp: , Rfl:     atorvastatin (LIPITOR) 40 mg tablet, Take 40 mg by mouth daily at bedtime, Disp: , Rfl: 4    B Complex Vitamins (VITAMIN B COMPLEX PO), Take by mouth, Disp: , Rfl:     B-D UF III MINI PEN NEEDLES 31G X 5 MM MISC, USE 1 DAILY, Disp: , Rfl: 5    Basaglar KwikPen 100 units/mL injection pen, Inject 60 Units under the skin daily , Disp: , Rfl:     BD Pen Needle Nelsy U/F 32G X 4 MM MISC, 2 (two) times a day Test, Disp: , Rfl:     Cholecalciferol 50 MCG (2000 UT) CAPS, Take 2 tablets by mouth 2 (two) times a day, Disp: , Rfl:     Cinnamon 500 MG TABS, Take by mouth 2 (two) times a day , Disp: , Rfl:     coenzyme Q-10 100 MG capsule, Take 2 capsules by mouth daily, Disp: , Rfl:     Cyanocobalamin (Vitamin B-12) 2500 MCG SUBL, Place under the tongue, Disp: , Rfl:     Ferrous Sulfate (Iron) 325 (65 Fe) MG TABS, Take by mouth 2 (two) times a day, Disp: , Rfl:     GARLIC PO, Take by mouth, Disp: , Rfl:     glipiZIDE (GLUCOTROL XL) 10 mg 24 hr tablet, Take 5 mg by mouth daily , Disp: , Rfl:     Icosapent Ethyl 1 g CAPS, Take 2 tablets twice a days, Disp: 360 capsule, Rfl: 1    Icosapent Ethyl 1 g CAPS, Take 2 capsules (2 g total) by mouth 2 (two) times a day before lunch and dinner, Disp: 360 capsule, Rfl: 2    Januvia 100 MG tablet, Take 1 tablet (100 mg total) by mouth daily, Disp: 90 tablet, Rfl: 2    Jardiance 10 MG TABS, 10 mg daily , Disp: , Rfl:     linaGLIPtin (Tradjenta) 5 MG TABS, Take 5 mg by mouth daily, Disp: , Rfl:     loratadine (CLARITIN) 10 mg tablet, Take 10 mg by mouth daily, Disp: , Rfl:     metFORMIN (GLUCOPHAGE-XR) 500 mg 24 hr tablet, Take 1,000 mg by mouth 2 (two) times a day, Disp: , Rfl:     metoprolol succinate (TOPROL-XL) 100 mg 24 hr tablet, TAKE 1 TABLET BY MOUTH DAILY, Disp: 90 tablet, Rfl: 3    Multiple Vitamin (MULTI-VITAMIN DAILY) TABS, Take 1 tablet by mouth daily, Disp: , Rfl:     olmesartan (BENICAR) 40 mg tablet, TAKE 1 TABLET BY MOUTH EVERY DAY, Disp: 90 tablet, Rfl: 2    pyridostigmine (MESTINON) 60 mg tablet, Take 60 mg by mouth 3 (three) times a day , Disp: , Rfl:     sitaGLIPtin (JANUVIA) 100 mg tablet, Take 1 tablet (100 mg total) by mouth daily, Disp: 90 tablet, Rfl: 2    triamterene-hydrochlorothiazide (MAXZIDE-25) 37 5-25 mg per tablet, TAKE 1 TABLET BY MOUTH EVERY DAY, Disp: 90 tablet, Rfl: 3    TURMERIC PO, Take by mouth, Disp: , Rfl:     zinc gluconate 50 mg tablet, Take 50 mg by mouth daily, Disp: , Rfl:       Active Problems     Patient Active Problem List   Diagnosis    Essential hypertension    Diabetes mellitus without complication ([facility] [address] )    Dyslipidemia    JERO (obstructive sleep apnea)    Myasthenia gravis (HCC)    History of malignant carcinoid tumor of rectum    Cystitis glandularis    Hypertensive heart disease without heart failure    Pain in both lower extremities    Annual physical exam    Class 1 obesity due to excess calories with serious comorbidity and body mass index (BMI) of 32 0 to 32 9 in adult    Umbilical hernia without obstruction and without gangrene    Benign prostatic hyperplasia without lower urinary tract symptoms    Gallstones    Acute bilateral low back pain without sciatica    Lumbar disc herniation    Encounter to discuss test results    Prostate cancer screening encounter, options and risks discussed         Past Medical History     Past Medical History:   Diagnosis Date    Carcinoid tumor of colon     Colon polyp     CPAP (continuous positive airway pressure) dependence     Hyperlipidemia     Myasthenia gravis ([facility] [address] )          Surgical History     Past Surgical History:   Procedure Laterality Date    BACK SURGERY  01/01/2001    lump removed     COLONOSCOPY      1/1/12,1/1/2016 tumor removed     DENTAL SURGERY      FL RETROGRADE PYELOGRAM  6/25/2020    NASAL POLYP EXCISION      OK COLONOSCOPY FLX DX W/COLLJ SPEC WHEN PFRMD N/A 7/19/2016    Procedure: COLONOSCOPY;  Surgeon: Gifty Li MD;  [facility]; Service: Gastroenterology    OK CYSTOURETHROSCOPY,FULGUR <0 5 CM LESN N/A 6/25/2020    Procedure: TRANSURETHRAL RESECTION OF BLADDER TUMOR (TURBT);   Surgeon: Olinda Ovalles MD;  Location: [facility];  Service: Urology    OK CYSTOURETHROSCOPY,URETER CATHETER Bilateral 6/25/2020    Procedure: Elly Parrot;  Surgeon: Olinda Ovalles MD;  [facility];  Service: Urology    OK REPAIR UMBILICAL MICY,1+V/V,TQLSM N/A 7/8/2021    Procedure: REPAIR HERNIA UMBILICAL WITH MESH;  Surgeon: Mariia Montiel MD;  [facility]: [facility];  Service: General    TRANSURETHRAL RESECTION OF PROSTATE           Family History     Family History   Problem Relation Age of Onset    Diabetes Mother     Heart disease Father     Heart failure Father     Prostate cancer Father     No Known Problems Sister          Social History     Social History     Social History     Tobacco Use   Smoking Status Never Smoker   Smokeless Tobacco Never Used         Pertinent Lab Values     Lab Results   Component Value Date    CREATININE 1 18 02/15/2022       Lab Results   Component Value Date    PSA 2 2 06/28/2022    PSA 2 3 03/08/2021    PSA 1 9 06/04/2020             Pertinent Imaging      no new imaging for my review

## 2022-09-07 ENCOUNTER — PROCEDURE VISIT (OUTPATIENT)
Dept: UROLOGY | Facility: CLINIC | Age: 62
End: 2022-09-07
Payer: COMMERCIAL

## 2022-09-07 VITALS
WEIGHT: 202 LBS | HEART RATE: 85 BPM | DIASTOLIC BLOOD PRESSURE: 80 MMHG | BODY MASS INDEX: 32.47 KG/M2 | HEIGHT: 66 IN | SYSTOLIC BLOOD PRESSURE: 150 MMHG | OXYGEN SATURATION: 98 %

## 2022-09-07 DIAGNOSIS — Z12.5 PROSTATE CANCER SCREENING ENCOUNTER, OPTIONS AND RISKS DISCUSSED: ICD-10-CM

## 2022-09-07 DIAGNOSIS — N30.80 CYSTITIS GLANDULARIS: Primary | ICD-10-CM

## 2022-09-07 DIAGNOSIS — N40.0 BENIGN PROSTATIC HYPERPLASIA WITHOUT LOWER URINARY TRACT SYMPTOMS: ICD-10-CM

## 2022-09-07 DIAGNOSIS — Z71.2 ENCOUNTER TO DISCUSS TEST RESULTS: ICD-10-CM

## 2022-09-07 LAB
SL AMB  POCT GLUCOSE, UA: NORMAL
SL AMB LEUKOCYTE ESTERASE,UA: NORMAL
SL AMB POCT BILIRUBIN,UA: NORMAL
SL AMB POCT BLOOD,UA: NORMAL
SL AMB POCT CLARITY,UA: CLEAR
SL AMB POCT COLOR,UA: YELLOW
SL AMB POCT KETONES,UA: NORMAL
SL AMB POCT NITRITE,UA: NORMAL
SL AMB POCT PH,UA: 5
SL AMB POCT SPECIFIC GRAVITY,UA: 1.01
SL AMB POCT URINE PROTEIN: NORMAL
SL AMB POCT UROBILINOGEN: 0.2

## 2022-09-07 PROCEDURE — 3079F DIAST BP 80-89 MM HG: CPT | Performed by: UROLOGY

## 2022-09-07 PROCEDURE — 3077F SYST BP >= 140 MM HG: CPT | Performed by: UROLOGY

## 2022-09-07 PROCEDURE — 3061F NEG MICROALBUMINURIA REV: CPT | Performed by: UROLOGY

## 2022-09-07 PROCEDURE — 81002 URINALYSIS NONAUTO W/O SCOPE: CPT | Performed by: UROLOGY

## 2022-09-07 PROCEDURE — 52000 CYSTOURETHROSCOPY: CPT | Performed by: UROLOGY

## 2022-09-07 PROCEDURE — 99214 OFFICE O/P EST MOD 30 MIN: CPT | Performed by: UROLOGY

## 2022-09-07 NOTE — ASSESSMENT & PLAN NOTE
AUA symptom score is 5/2, requires no surgical or additional medical therapies at this time    Continue surveillance of LUTS

## 2022-09-07 NOTE — PROGRESS NOTES
Office Cystoscopy Procedure Note    Indication:    Bladder lesion status post resection, cystitis glandularis, surveillance    Informed consent   The risks, benefits, complications, treatment options, and expected outcomes were discussed with the patient  The patient concurred with the proposed plan and provided informed consent  Anesthesia  Lidocaine jelly 2%    Antibiotic prophylaxis   None    Procedure  The patient was placed in the supineposition, was prepped and draped in the usual manner using sterile technique, and 2% lidocaine jelly instilled into the urethra  A 17 F flexible cystoscope was then inserted into the urethra and the urethra and bladder carefully examined  The following findings were noted:    Findings:  Urethra:  Normal  Prostate:  Some lateral lobe hypertrophy, no lesions  Bladder:  No lesions, tumors, defects, or stones, small median lobe noted  Ureteral orifices:  Orthotopic  Other findings:  None, retroflexed view confirms    Specimens: None                 Complications:    None; patient tolerated the procedure well           Disposition: To home            Condition: Stable    Plan: Negative surveillance cystoscopy  Continue yearly surveillance cystoscopy for history of cystitis glandularis to look for malignant transformation  Cystoscopy     Date/Time 9/7/2022 10:05 AM     Performed by  Tati Pollard MD     Authorized by Tati Pollard MD      Universal Protocol:  Consent: Verbal consent obtained    Risks and benefits: risks, benefits and alternatives were discussed  Consent given by: patient  Patient understanding: patient states understanding of the procedure being performed  Patient consent: the patient's understanding of the procedure matches consent given  Procedure consent: procedure consent matches procedure scheduled  Relevant documents: relevant documents present and verified  Test results: test results available and properly labeled  Site marked: the operative site was not marked  Radiology Images displayed and confirmed  If images not available, report reviewed: imaging studies available  Required items: required blood products, implants, devices, and special equipment available  Patient identity confirmed: verbally with patient and provided demographic data        Procedure Details:  Procedure type: cystoscopy    Patient tolerance: Patient tolerated the procedure well with no immediate complications    Additional Procedure Details: Office Cystoscopy Procedure Note    Indication:    Bladder lesion status post resection, cystitis glandularis, surveillance    Informed consent   The risks, benefits, complications, treatment options, and expected outcomes were discussed with the patient  The patient concurred with the proposed plan and provided informed consent  Anesthesia  Lidocaine jelly 2%    Antibiotic prophylaxis   None    Procedure  The patient was placed in the supineposition, was prepped and draped in the usual manner using sterile technique, and 2% lidocaine jelly instilled into the urethra  A 17 F flexible cystoscope was then inserted into the urethra and the urethra and bladder carefully examined  The following findings were noted:    Findings:  Urethra:  Normal  Prostate:  Some lateral lobe hypertrophy, no lesions  Bladder:  No lesions, tumors, defects, or stones, small median lobe noted  Ureteral orifices:  Orthotopic  Other findings:  None, retroflexed view confirms    Specimens: None                 Complications:    None; patient tolerated the procedure well           Disposition: To home            Condition: Stable    Plan: Negative surveillance cystoscopy  Continue yearly surveillance cystoscopy for history of cystitis glandularis to look for malignant transformation

## 2022-09-07 NOTE — ASSESSMENT & PLAN NOTE
Surveillance cystoscopy is negative today, no urothelial lesions, small median lobe noted on cystoscopy    Continue annual surveillance cystoscopy given low risk of conversion to adenocarcinoma of the bladder

## 2022-09-28 ENCOUNTER — APPOINTMENT (OUTPATIENT)
Dept: LAB | Facility: AMBULARY SURGERY CENTER | Age: 62
End: 2022-09-28
Payer: COMMERCIAL

## 2022-09-28 DIAGNOSIS — E11.9 DIABETES MELLITUS WITHOUT COMPLICATION (HCC): ICD-10-CM

## 2022-09-28 DIAGNOSIS — E11.65 UNCONTROLLED TYPE 2 DIABETES MELLITUS WITH HYPERGLYCEMIA (HCC): ICD-10-CM

## 2022-09-28 LAB
ALBUMIN SERPL BCP-MCNC: 4 G/DL (ref 3.5–5)
ALP SERPL-CCNC: 53 U/L (ref 46–116)
ALT SERPL W P-5'-P-CCNC: 23 U/L (ref 12–78)
ANION GAP SERPL CALCULATED.3IONS-SCNC: 4 MMOL/L (ref 4–13)
AST SERPL W P-5'-P-CCNC: 16 U/L (ref 5–45)
BILIRUB SERPL-MCNC: 0.78 MG/DL (ref 0.2–1)
BUN SERPL-MCNC: 14 MG/DL (ref 5–25)
CALCIUM SERPL-MCNC: 9.3 MG/DL (ref 8.3–10.1)
CHLORIDE SERPL-SCNC: 107 MMOL/L (ref 96–108)
CO2 SERPL-SCNC: 33 MMOL/L (ref 21–32)
CREAT SERPL-MCNC: 1.15 MG/DL (ref 0.6–1.3)
EST. AVERAGE GLUCOSE BLD GHB EST-MCNC: 123 MG/DL
GFR SERPL CREATININE-BSD FRML MDRD: 67 ML/MIN/1.73SQ M
GLUCOSE P FAST SERPL-MCNC: 84 MG/DL (ref 65–99)
HBA1C MFR BLD: 5.9 %
POTASSIUM SERPL-SCNC: 3.9 MMOL/L (ref 3.5–5.3)
PROT SERPL-MCNC: 7.3 G/DL (ref 6.4–8.4)
SODIUM SERPL-SCNC: 144 MMOL/L (ref 135–147)

## 2022-09-28 PROCEDURE — 83036 HEMOGLOBIN GLYCOSYLATED A1C: CPT

## 2022-09-28 PROCEDURE — 36415 COLL VENOUS BLD VENIPUNCTURE: CPT

## 2022-09-28 PROCEDURE — 3044F HG A1C LEVEL LT 7.0%: CPT | Performed by: UROLOGY

## 2022-09-28 PROCEDURE — 80053 COMPREHEN METABOLIC PANEL: CPT

## 2022-10-04 ENCOUNTER — OFFICE VISIT (OUTPATIENT)
Dept: ENDOCRINOLOGY | Age: 62
End: 2022-10-04
Payer: COMMERCIAL

## 2022-10-04 VITALS
SYSTOLIC BLOOD PRESSURE: 132 MMHG | HEIGHT: 66 IN | OXYGEN SATURATION: 97 % | DIASTOLIC BLOOD PRESSURE: 72 MMHG | HEART RATE: 85 BPM | WEIGHT: 200.6 LBS | TEMPERATURE: 97.6 F | BODY MASS INDEX: 32.24 KG/M2

## 2022-10-04 DIAGNOSIS — E11.9 DIABETES MELLITUS WITHOUT COMPLICATION (HCC): ICD-10-CM

## 2022-10-04 DIAGNOSIS — E78.2 MIXED HYPERLIPIDEMIA: Primary | ICD-10-CM

## 2022-10-04 DIAGNOSIS — I10 ESSENTIAL HYPERTENSION: ICD-10-CM

## 2022-10-04 DIAGNOSIS — E78.5 DYSLIPIDEMIA: ICD-10-CM

## 2022-10-04 DIAGNOSIS — E11.65 INADEQUATELY CONTROLLED DIABETES MELLITUS (HCC): ICD-10-CM

## 2022-10-04 DIAGNOSIS — G70.00 MYASTHENIA GRAVIS (HCC): ICD-10-CM

## 2022-10-04 DIAGNOSIS — Z85.040 HISTORY OF MALIGNANT CARCINOID TUMOR OF RECTUM: ICD-10-CM

## 2022-10-04 PROCEDURE — 99214 OFFICE O/P EST MOD 30 MIN: CPT | Performed by: INTERNAL MEDICINE

## 2022-10-04 RX ORDER — SITAGLIPTIN 100 MG/1
100 TABLET, FILM COATED ORAL DAILY
Qty: 90 TABLET | Refills: 2 | Status: SHIPPED | OUTPATIENT
Start: 2022-10-04

## 2022-10-04 RX ORDER — INSULIN GLARGINE 100 [IU]/ML
60 INJECTION, SOLUTION SUBCUTANEOUS DAILY
Start: 2022-10-04 | End: 2023-01-02

## 2022-10-04 RX ORDER — ATORVASTATIN CALCIUM 40 MG/1
40 TABLET, FILM COATED ORAL
Qty: 90 TABLET | Refills: 0
Start: 2022-10-04 | End: 2022-10-12 | Stop reason: SDUPTHER

## 2022-10-04 RX ORDER — EMPAGLIFLOZIN 10 MG/1
10 TABLET, FILM COATED ORAL DAILY
Start: 2022-10-04

## 2022-10-04 NOTE — PATIENT INSTRUCTIONS
All meds same but  Reduce basaglar to 55 U in am  Call Dr Lorraine Richards if BS is < 80 and > 180 persistently  Eye exam report

## 2022-10-05 ENCOUNTER — OFFICE VISIT (OUTPATIENT)
Dept: SLEEP CENTER | Facility: CLINIC | Age: 62
End: 2022-10-05
Payer: COMMERCIAL

## 2022-10-05 VITALS
HEIGHT: 66 IN | SYSTOLIC BLOOD PRESSURE: 142 MMHG | WEIGHT: 198 LBS | DIASTOLIC BLOOD PRESSURE: 74 MMHG | BODY MASS INDEX: 31.82 KG/M2

## 2022-10-05 DIAGNOSIS — I10 ESSENTIAL HYPERTENSION: ICD-10-CM

## 2022-10-05 DIAGNOSIS — I11.9 HYPERTENSIVE HEART DISEASE WITHOUT HEART FAILURE: ICD-10-CM

## 2022-10-05 DIAGNOSIS — G70.00 MYASTHENIA GRAVIS (HCC): ICD-10-CM

## 2022-10-05 DIAGNOSIS — G47.33 OSA (OBSTRUCTIVE SLEEP APNEA): Primary | ICD-10-CM

## 2022-10-05 DIAGNOSIS — R68.2 DRY MOUTH: ICD-10-CM

## 2022-10-05 DIAGNOSIS — I10 ESSENTIAL (PRIMARY) HYPERTENSION: ICD-10-CM

## 2022-10-05 DIAGNOSIS — E66.9 OBESITY (BMI 30-39.9): ICD-10-CM

## 2022-10-05 PROCEDURE — 99214 OFFICE O/P EST MOD 30 MIN: CPT | Performed by: INTERNAL MEDICINE

## 2022-10-05 RX ORDER — OLMESARTAN MEDOXOMIL 40 MG/1
TABLET ORAL
Qty: 90 TABLET | Refills: 2 | Status: SHIPPED | OUTPATIENT
Start: 2022-10-05

## 2022-10-05 NOTE — PROGRESS NOTES
Follow-Up Note - [facility]   Dwain Koch  58 y o  male  LBR:[birth year]  RUV:[id number]  DOS:10/5/2022    CC: I saw this patient for follow-up in clinic today for Sleep disordered breathing, Coexisting Sleep and Medical Problems  He is using a ResMed machine  Results of prior studies in 2014: The diagnostic study demonstrated an RDI of 10 per hour, considerably higher during REM at 19 per hour  Minimum oxygen saturation was 87%  During the subsequent therapeutic study, sleep disordered breathing was adequately remediated with nasal CPAP at 11 cm H2O    PFSH, Problem List, Medications & Allergies were reviewed in EMR  Interval changes: none reported  He  has a past medical history of BPH (benign prostatic hyperplasia), Carcinoid tumor of colon, Carcinoid tumor of rectum, Colon polyp, CPAP (continuous positive airway pressure) dependence, Diabetes mellitus ([address] ), Diabetic eye exam ([address] ) (2020), Hyperlipidemia, Hypernatremia, Hypertension, Iron deficiency, Liver hemangioma, Myasthenia gravis ([address] ), Nephropathy, Retinopathy, and Sleep apnea  He has a current medication list which includes the following prescription(s): amlodipine, vitamin c, aspirin, atorvastatin, b complex vitamins, b-d uf iii mini pen needles, basaglar kwikpen, cholecalciferol, cinnamon, coenzyme q-10, vitamin R-23, iron, garlic, glipizide, icosapent ethyl, januvia, jardiance, metformin, metoprolol succinate, multi-vitamin daily, olmesartan, pyridostigmine, sitagliptin, triamterene-hydrochlorothiazide, turmeric, zinc gluconate, accu-chek rupesh plus, bd pen needle devyn u/f, icosapent ethyl, and loratadine  PHYSIOLOGICAL DATA REVIEW AND INTERPRETATION:    using PAP > 4 hours/night 80%  Estimated ANA 1 9/hour with pressure of 12 8cm H2O @90th/95th percentile; mask leak at 95th percentile was 29 9 L per minute  Patient has not been using ozone based devices to sanitize the machine      SUBJECTIVE: Regarding use of PAP, Charanjit Ontiveros reports:   · some adverse effects: dry mouth/throat and mask leaks ; has not noticed any fibres or foreign material in air line  · He is benefiting from use: sleeping better   Sleep Routine: Rekha Perera reports getting 7 5 hrs sleep  ; he has no difficulty initiating or maintaining sleep   He arises before alarm most days and feels refreshed  Rekha Perera denies Excessive Daytime Sleepiness, dozes off when sedentary at home in the evenings while watching TV  He rated himself at Total score: 9 /24 on the [place] Sleepiness Scale  Habits: reports that he has never smoked  He has never used smokeless tobacco ,  reports current alcohol use of about 1 0 standard drink of alcohol per week  ,  reports no history of drug use , Caffeine use:moderate ; Exercise routine: regular    ROS: reviewed & as attached  Significant for weight is stable within a few lb  He reports no nasal, respiratory or cardiac symptoms  EXAM: /74   Ht 5' 6" (1 676 m)   Wt 89 8 kg (198 lb)   BMI 31 96 kg/m²     Wt Readings from Last 3 Encounters:   10/05/22 89 8 kg (198 lb)   10/04/22 91 kg (200 lb 9 6 oz)   09/07/22 91 6 kg (202 lb)      Patient is well groomed; well appearing  CNS: Alert, orientated, clear & coherent speech  Psych: cooperativeand in no distress  Mental state:appears normal   H&N: EOMI; NC/AT:no facial pressure marks, no rashes  Skin/Extrem: col & hydration normal; no edema  Resp: Respiratory effort is normal  Physical findings otherwise essentially unchanged from previous  IMPRESSION: Problem List Items & Comorbidities Addressed this Visit    1  JERO (obstructive sleep apnea)  PAP DME Resupply/Reorder   2  Dry mouth     3  Myasthenia gravis ([address] )     4  Essential hypertension     5  Hypertensive heart disease without heart failure     6  Obesity (BMI 30-39  9)         PLAN:  1  I reviewed results of prior studies and physiologic data with the patient     2  I discussed treatment options with risks and benefits  3  Treatment with  PAP is medically necessary and Carlos Prieto is agreable to continue use  4  Care of equipment, methods to improve comfort using PAP and importance of compliance with therapy were discussed  5  Pressure setting: continue 11-13 cmH2O     6  Rx provided to replace  supplies and Care coordinated with DME provider  7  Discussed strategies for weight reduction  8  Follow-up is advised in 1 year or sooner if needed to monitor progress, compliance and to adjust therapy  Thank you for allowing me to participate in the care of this patient  Sincerely,     Authenticated electronically on 19/00/64   Board Certified Specialist     Portions of the record may have been created with voice recognition software  Occasional wrong word or "sound a like" substitutions may have occurred due to the inherent limitations of voice recognition software  There may also be notations and random deletions of words or characters from malfunctioning software  Read the chart carefully and recognize, using context, where substitutions/deletions have occurred

## 2022-10-05 NOTE — PATIENT INSTRUCTIONS

## 2022-10-06 ENCOUNTER — TELEPHONE (OUTPATIENT)
Dept: SLEEP CENTER | Facility: CLINIC | Age: 62
End: 2022-10-06

## 2022-10-12 DIAGNOSIS — E78.2 MIXED HYPERLIPIDEMIA: ICD-10-CM

## 2022-10-12 LAB

## 2022-10-17 RX ORDER — ATORVASTATIN CALCIUM 40 MG/1
40 TABLET, FILM COATED ORAL
Qty: 90 TABLET | Refills: 0 | Status: SHIPPED | OUTPATIENT
Start: 2022-10-17 | End: 2023-01-15

## 2022-11-06 PROBLEM — Z12.5 PROSTATE CANCER SCREENING ENCOUNTER, OPTIONS AND RISKS DISCUSSED: Status: RESOLVED | Noted: 2022-09-07 | Resolved: 2022-11-06

## 2022-11-07 LAB
LEFT EYE DIABETIC RETINOPATHY: POSITIVE
RIGHT EYE DIABETIC RETINOPATHY: POSITIVE

## 2022-11-07 PROCEDURE — 2022F DILAT RTA XM EVC RTNOPTHY: CPT | Performed by: INTERNAL MEDICINE

## 2022-12-01 DIAGNOSIS — E11.65 INADEQUATELY CONTROLLED DIABETES MELLITUS (HCC): ICD-10-CM

## 2022-12-01 RX ORDER — INSULIN GLARGINE 100 [IU]/ML
55 INJECTION, SOLUTION SUBCUTANEOUS DAILY
Qty: 49.5 ML | Refills: 2 | Status: SHIPPED | OUTPATIENT
Start: 2022-12-01 | End: 2023-03-01

## 2022-12-01 RX ORDER — INSULIN GLARGINE 100 [IU]/ML
60 INJECTION, SOLUTION SUBCUTANEOUS DAILY
Qty: 18 ML | Refills: 0 | Status: SHIPPED | OUTPATIENT
Start: 2022-12-01 | End: 2022-12-01 | Stop reason: SDUPTHER

## 2022-12-01 RX ORDER — EMPAGLIFLOZIN 10 MG/1
10 TABLET, FILM COATED ORAL DAILY
Qty: 90 TABLET | Refills: 2 | Status: SHIPPED | OUTPATIENT
Start: 2022-12-01

## 2022-12-07 DIAGNOSIS — I10 ESSENTIAL HYPERTENSION: ICD-10-CM

## 2022-12-07 RX ORDER — AMLODIPINE BESYLATE 10 MG/1
10 TABLET ORAL DAILY
Qty: 90 TABLET | Refills: 0 | Status: SHIPPED | OUTPATIENT
Start: 2022-12-07

## 2023-01-04 ENCOUNTER — APPOINTMENT (OUTPATIENT)
Dept: LAB | Facility: AMBULARY SURGERY CENTER | Age: 63
End: 2023-01-04

## 2023-01-04 DIAGNOSIS — E11.65 INADEQUATELY CONTROLLED DIABETES MELLITUS (HCC): ICD-10-CM

## 2023-01-04 DIAGNOSIS — I10 ESSENTIAL HYPERTENSION: ICD-10-CM

## 2023-01-04 DIAGNOSIS — E78.2 MIXED HYPERLIPIDEMIA: ICD-10-CM

## 2023-01-04 LAB
ANION GAP SERPL CALCULATED.3IONS-SCNC: 6 MMOL/L (ref 4–13)
BUN SERPL-MCNC: 15 MG/DL (ref 5–25)
CALCIUM SERPL-MCNC: 9.9 MG/DL (ref 8.3–10.1)
CHLORIDE SERPL-SCNC: 104 MMOL/L (ref 96–108)
CHOLEST SERPL-MCNC: 99 MG/DL
CO2 SERPL-SCNC: 32 MMOL/L (ref 21–32)
CREAT SERPL-MCNC: 1.11 MG/DL (ref 0.6–1.3)
GFR SERPL CREATININE-BSD FRML MDRD: 70 ML/MIN/1.73SQ M
GLUCOSE P FAST SERPL-MCNC: 73 MG/DL (ref 65–99)
HDLC SERPL-MCNC: 44 MG/DL
LDLC SERPL CALC-MCNC: 29 MG/DL (ref 0–100)
NONHDLC SERPL-MCNC: 55 MG/DL
POTASSIUM SERPL-SCNC: 3.8 MMOL/L (ref 3.5–5.3)
SODIUM SERPL-SCNC: 142 MMOL/L (ref 135–147)
TRIGL SERPL-MCNC: 128 MG/DL
TSH SERPL DL<=0.05 MIU/L-ACNC: 1.24 UIU/ML (ref 0.45–4.5)

## 2023-01-05 ENCOUNTER — OFFICE VISIT (OUTPATIENT)
Dept: FAMILY MEDICINE CLINIC | Facility: CLINIC | Age: 63
End: 2023-01-05

## 2023-01-05 VITALS
HEART RATE: 80 BPM | HEIGHT: 66 IN | BODY MASS INDEX: 31.18 KG/M2 | WEIGHT: 194 LBS | DIASTOLIC BLOOD PRESSURE: 60 MMHG | OXYGEN SATURATION: 97 % | SYSTOLIC BLOOD PRESSURE: 112 MMHG | RESPIRATION RATE: 16 BRPM | TEMPERATURE: 98.7 F

## 2023-01-05 DIAGNOSIS — E66.09 CLASS 1 OBESITY DUE TO EXCESS CALORIES WITH SERIOUS COMORBIDITY AND BODY MASS INDEX (BMI) OF 32.0 TO 32.9 IN ADULT: ICD-10-CM

## 2023-01-05 DIAGNOSIS — E11.9 TYPE 2 DIABETES MELLITUS WITHOUT COMPLICATION, WITH LONG-TERM CURRENT USE OF INSULIN (HCC): ICD-10-CM

## 2023-01-05 DIAGNOSIS — G47.33 OSA (OBSTRUCTIVE SLEEP APNEA): Primary | ICD-10-CM

## 2023-01-05 DIAGNOSIS — N40.0 BENIGN PROSTATIC HYPERPLASIA WITHOUT LOWER URINARY TRACT SYMPTOMS: ICD-10-CM

## 2023-01-05 DIAGNOSIS — Z79.4 TYPE 2 DIABETES MELLITUS WITHOUT COMPLICATION, WITH LONG-TERM CURRENT USE OF INSULIN (HCC): ICD-10-CM

## 2023-01-05 DIAGNOSIS — I10 ESSENTIAL HYPERTENSION: ICD-10-CM

## 2023-01-05 DIAGNOSIS — G70.00 MYASTHENIA GRAVIS (HCC): ICD-10-CM

## 2023-01-05 DIAGNOSIS — E78.2 MIXED HYPERLIPIDEMIA: ICD-10-CM

## 2023-01-05 DIAGNOSIS — E78.5 DYSLIPIDEMIA: ICD-10-CM

## 2023-01-05 PROBLEM — N18.2 STAGE 2 CHRONIC KIDNEY DISEASE: Status: ACTIVE | Noted: 2023-01-05

## 2023-01-05 LAB
EST. AVERAGE GLUCOSE BLD GHB EST-MCNC: 131 MG/DL
HBA1C MFR BLD: 6.2 %

## 2023-01-05 NOTE — PROGRESS NOTES
Name: iDana Topete      : 1960      MRN: [id number]  Encounter Provider: Karen Wilson MD  Encounter Date: 2023   Encounter department: [address] [place] [address] Dr MEDICINE    Assessment & Plan     1  JERO (obstructive sleep apnea)  Assessment & Plan:  Ok on cpap      2  Essential hypertension  Assessment & Plan:  Well controlled on current therapy continue with current medications and will reassess next visit pt also seeing Dr Manish Hernandez for HTN pt to ask him if he should be on baby aspirin current guidelines recommend only if had MI CVA         3  Myasthenia gravis [facility])  Assessment & Plan:  Ok on mestinon eyes droop at end of the day       4  Benign prostatic hyperplasia without lower urinary tract symptoms  Assessment & Plan:  asymptomatic      5  Dyslipidemia  Assessment & Plan:  Labs ok       6  Mixed hyperlipidemia    7  Class 1 obesity due to excess calories with serious comorbidity and body mass index (BMI) of 32 0 to 32 9 in adult  Assessment & Plan:  Discussion on diet and exercise guidelines for weight loss and  health reviewed with pt         8  Type 2 diabetes mellitus without complication, with long-term current use of insulin (HCC)  Assessment & Plan:    Lab Results   Component Value Date    HGBA1C 6 2 (H) 2023   followed by endocrinologist well controlled             Subjective      HPI Pt is here for interval visit and evaluation of multiple medical problems, review of medications, labs, Health Maintenance and any recent specialty consults    Review of Systems   Constitutional: Negative for appetite change, chills, fatigue and fever  Respiratory: Negative for cough, chest tightness and shortness of breath  Cardiovascular: Negative for chest pain, palpitations and leg swelling  Gastrointestinal: Negative for abdominal pain, constipation, diarrhea, nausea and vomiting  Genitourinary: Negative for difficulty urinating and frequency     Musculoskeletal: Negative for arthralgias, back pain and neck pain  Skin: Negative for rash  Neurological: Negative for dizziness, weakness, light-headedness, numbness and headaches  Hematological: Does not bruise/bleed easily  Psychiatric/Behavioral: Negative for dysphoric mood and sleep disturbance  The patient is not nervous/anxious  Current Outpatient Medications on File Prior to Visit   Medication Sig   • amLODIPine (NORVASC) 10 mg tablet Take 1 tablet (10 mg total) by mouth daily   • Ascorbic Acid (VITAMIN C) 500 MG CAPS Take 1 tablet by mouth daily   • aspirin 81 MG tablet Take 81 mg by mouth daily     • atorvastatin (LIPITOR) 40 mg tablet Take 1 tablet (40 mg total) by mouth daily at bedtime   • B Complex Vitamins (VITAMIN B COMPLEX PO) Take by mouth   • B-D UF III MINI PEN NEEDLES 31G X 5 MM MISC USE 1 DAILY   • Basaglar KwikPen 100 units/mL SOPN Inject 0 55 mL (55 Units total) under the skin daily   • Cholecalciferol 50 MCG (2000 UT) CAPS Take 2 tablets by mouth 2 (two) times a day Once daily   • Cinnamon 500 MG TABS Take by mouth 2 (two) times a day    • coenzyme Q-10 100 MG capsule Take 2 capsules by mouth daily   • Cyanocobalamin (Vitamin B-12) 2500 MCG SUBL Place under the tongue   • Ferrous Sulfate (Iron) 325 (65 Fe) MG TABS Take by mouth 2 (two) times a day   • GARLIC PO Take by mouth   • glipiZIDE (GLUCOTROL XL) 10 mg 24 hr tablet Take 5 mg by mouth daily    • Icosapent Ethyl 1 g CAPS Take 2 tablets twice a days   • Icosapent Ethyl 1 g CAPS Take 2 capsules (2 g total) by mouth 2 (two) times a day before lunch and dinner   • Januvia 100 MG tablet Take 1 tablet (100 mg total) by mouth daily   • Jardiance 10 MG TABS tablet Take 1 tablet (10 mg total) by mouth daily   • loratadine (CLARITIN) 10 mg tablet Take 10 mg by mouth daily   • metFORMIN (GLUCOPHAGE-XR) 500 mg 24 hr tablet Take 2 tablets with breakfast and 2 tablets with dinner   • metoprolol succinate (TOPROL-XL) 100 mg 24 hr tablet TAKE 1 TABLET BY MOUTH DAILY • Multiple Vitamin (MULTI-VITAMIN DAILY) TABS Take 1 tablet by mouth daily   • olmesartan (BENICAR) 40 mg tablet TAKE 1 TABLET BY MOUTH EVERY DAY   • pyridostigmine (MESTINON) 60 mg tablet Take 60 mg by mouth 3 (three) times a day  • sitaGLIPtin (JANUVIA) 100 mg tablet Take 1 tablet (100 mg total) by mouth daily   • triamterene-hydrochlorothiazide (MAXZIDE-25) 37 5-25 mg per tablet TAKE 1 TABLET BY MOUTH EVERY DAY   • TURMERIC PO Take by mouth   • zinc gluconate 50 mg tablet Take 50 mg by mouth daily   • Accu-Chek Danielle Plus test strip TEST BLOOD SUGARS ONCE DAILY (Patient not taking: Reported on 1/5/2023)   • BD Pen Needle Nelsy U/F 32G X 4 MM MISC 2 (two) times a day Test (Patient not taking: Reported on 1/5/2023)       Objective     /60 (BP Location: Left arm, Patient Position: Sitting, Cuff Size: Standard)   Pulse 80   Temp 98 7 °F (37 1 °C) (Tympanic)   Resp 16   Ht 5' 6" (1 676 m)   Wt 88 kg (194 lb)   SpO2 97%   BMI 31 31 kg/m²     Physical Exam  Vitals reviewed  Constitutional:       General: He is not in acute distress  Appearance: Normal appearance  He is well-developed  He is obese  He is not ill-appearing  HENT:      Mouth/Throat:      Mouth: Mucous membranes are moist    Eyes:      Extraocular Movements: Extraocular movements intact  Conjunctiva/sclera: Conjunctivae normal       Pupils: Pupils are equal, round, and reactive to light  Neck:      Thyroid: No thyromegaly  Vascular: No carotid bruit  Cardiovascular:      Rate and Rhythm: Normal rate and regular rhythm  Pulses: Normal pulses  no weak pulses          Dorsalis pedis pulses are 2+ on the right side and 2+ on the left side  Heart sounds: Normal heart sounds  No murmur heard  Pulmonary:      Effort: Pulmonary effort is normal       Breath sounds: Normal breath sounds  Chest:      Chest wall: No tenderness  Abdominal:      General: Bowel sounds are normal  There is no distension  Palpations: Abdomen is soft  Tenderness: There is no abdominal tenderness  Musculoskeletal:      Cervical back: Normal range of motion and neck supple  Feet:      Right foot:      Skin integrity: Dry skin present  No ulcer, skin breakdown, erythema, warmth or callus  Left foot:      Skin integrity: Dry skin present  No ulcer, skin breakdown, erythema, warmth or callus  Lymphadenopathy:      Cervical: No cervical adenopathy  Skin:     General: Skin is warm and dry  Neurological:      General: No focal deficit present  Mental Status: He is alert and oriented to person, place, and time  Mental status is at baseline  Cranial Nerves: No cranial nerve deficit  Psychiatric:         Mood and Affect: Mood normal          Behavior: Behavior normal      Patient's shoes and socks removed  Right Foot/Ankle   Right Foot Inspection  Skin Exam: skin normal, skin intact and dry skin  No warmth, no callus, no erythema, no maceration, no abnormal color, no pre-ulcer, no ulcer and no callus  Toe Exam: No swelling, no tenderness, erythema and  no right toe deformity    Sensory   Vibration: intact  Proprioception: intact  Monofilament testing: intact    Vascular  The right DP pulse is 2+  Right Toe  - Comprehensive Exam  Ecchymosis: none  Swelling: none   Tenderness: none         Left Foot/Ankle  Left Foot Inspection  Skin Exam: skin normal, skin intact and dry skin  No warmth, no erythema, no maceration, normal color, no pre-ulcer, no ulcer and no callus  Sensory   Vibration: intact  Proprioception: intact  Monofilament testing: intact    Vascular  The left DP pulse is 2+  Left Toe  - Comprehensive Exam  Ecchymosis: none  Swelling: none   Tenderness: none           Assign Risk Category  No deformity present  No loss of protective sensation  No weak pulses  Risk: 0    BMI Counseling: Body mass index is 31 31 kg/m²   The BMI is above normal  Nutrition recommendations include 3-5 servings of fruits/vegetables daily, reducing fast food intake, consuming healthier snacks, decreasing soda and/or juice intake, moderation in carbohydrate intake, increasing intake of lean protein and reducing intake of saturated fat and trans fat  Exercise recommendations include exercising 3-5 times per week and strength training exercises    Lorna Joiner MD

## 2023-01-05 NOTE — ASSESSMENT & PLAN NOTE
Lab Results   Component Value Date    EGFR 70 01/04/2023    EGFR 67 09/28/2022    EGFR 66 02/15/2022    CREATININE 1 11 01/04/2023    CREATININE 1 15 09/28/2022    CREATININE 1 18 02/15/2022   GFR improving pt on jardiance and  ARB

## 2023-01-05 NOTE — ASSESSMENT & PLAN NOTE
Well controlled on current therapy continue with current medications and will reassess next visit pt also seeing Dr Carey Mancia for HTN pt to ask him if he should be on baby aspirin current guidelines recommend only if had MI CVA

## 2023-01-05 NOTE — ASSESSMENT & PLAN NOTE
Lab Results   Component Value Date    HGBA1C 6 2 (H) 01/04/2023   followed by endocrinologist well controlled

## 2023-01-09 ENCOUNTER — OFFICE VISIT (OUTPATIENT)
Dept: ENDOCRINOLOGY | Age: 63
End: 2023-01-09

## 2023-01-09 VITALS
BODY MASS INDEX: 31.72 KG/M2 | HEART RATE: 88 BPM | DIASTOLIC BLOOD PRESSURE: 72 MMHG | TEMPERATURE: 97.4 F | WEIGHT: 197.4 LBS | SYSTOLIC BLOOD PRESSURE: 138 MMHG | HEIGHT: 66 IN | OXYGEN SATURATION: 94 %

## 2023-01-09 DIAGNOSIS — E11.65 TYPE 2 DIABETES MELLITUS WITH HYPERGLYCEMIA, WITHOUT LONG-TERM CURRENT USE OF INSULIN (HCC): ICD-10-CM

## 2023-01-09 DIAGNOSIS — G70.00 MYASTHENIA GRAVIS (HCC): ICD-10-CM

## 2023-01-09 DIAGNOSIS — E11.9 TYPE 2 DIABETES MELLITUS WITHOUT COMPLICATION, WITH LONG-TERM CURRENT USE OF INSULIN (HCC): Primary | ICD-10-CM

## 2023-01-09 DIAGNOSIS — I10 ESSENTIAL HYPERTENSION: ICD-10-CM

## 2023-01-09 DIAGNOSIS — Z79.4 TYPE 2 DIABETES MELLITUS WITHOUT COMPLICATION, WITH LONG-TERM CURRENT USE OF INSULIN (HCC): Primary | ICD-10-CM

## 2023-01-09 DIAGNOSIS — E78.2 MIXED HYPERLIPIDEMIA: ICD-10-CM

## 2023-01-09 DIAGNOSIS — E78.5 DYSLIPIDEMIA: ICD-10-CM

## 2023-01-09 RX ORDER — ATORVASTATIN CALCIUM 40 MG/1
40 TABLET, FILM COATED ORAL
Qty: 90 TABLET | Refills: 0 | Status: SHIPPED | OUTPATIENT
Start: 2023-01-09 | End: 2023-04-09

## 2023-01-09 NOTE — PROGRESS NOTES
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 and no issue at this time    Plan:  All meds same  Eye exam report  RTV in 4 months with A1v, urine micro alb/cr    CC: Diabetes    History of Present Illness     HPI:  T2DM with no pancretitis/ dyslipidemia/ HTN     Review of Systems   Constitutional: Negative for appetite change, fatigue and unexpected weight change  HENT: Negative for trouble swallowing  Eyes: Positive for visual disturbance  Eye lid droop at the end of the day   Respiratory: Negative for cough, chest tightness and shortness of breath  Cardiovascular: Negative for chest pain, palpitations and leg swelling  Gastrointestinal: Negative for abdominal distention, abdominal pain, blood in stool, constipation, diarrhea, nausea and vomiting  Endocrine: Negative for polyphagia and polyuria  Genitourinary: Negative for dysuria, frequency and genital sores  Musculoskeletal: Negative  Skin: Negative for rash and wound  Neurological: Negative for weakness, numbness and headaches  Hematological: Does not bruise/bleed easily  Psychiatric/Behavioral: Negative for confusion         Historical Information   Past Medical History:   Diagnosis Date   • BPH (benign prostatic hyperplasia)    • Carcinoid tumor of colon    • Carcinoid tumor of rectum    • Colon polyp    • CPAP (continuous positive airway pressure) dependence    • Diabetes mellitus ([facility] [address] )    • [facility] 2020   • Hyperlipidemia    • Hypernatremia    • Hypertension    • Iron deficiency    • Liver hemangioma    • Myasthenia gravis ([facility] [address] )    • Nephropathy    • Retinopathy    • Sleep apnea      Past Surgical History:   Procedure Laterality Date   • BACK SURGERY  01/01/2001    lump removed    • COLONOSCOPY      1/1/12,1/1/2016 tumor removed    • DENTAL SURGERY     • FL RETROGRADE PYELOGRAM  6/25/2020   • NASAL POLYP EXCISION     • MD COLONOSCOPY FLX DX W/COLLJ SPEC WHEN PFRMD N/A 7/19/2016    Procedure: COLONOSCOPY;  Surgeon: Roxana Colin MD;  Location: [facility]; Service: Gastroenterology   • MD CYSTO BLADDER W/URETERAL CATHETERIZATION Bilateral 6/25/2020    Procedure: CYSTOSCOPY WITH RETROGRADE PYELOGRAM;  Surgeon: Jensen Chun MD;  Location: [facility];  Service: Urology   • MD CYSTO W/REMOVAL OF LESIONS SMALL N/A 6/25/2020    Procedure: TRANSURETHRAL RESECTION OF BLADDER TUMOR (TURBT); Surgeon: Jensen Chun MD;  Location: [facility];  Service: Urology   • MD RPR UMBILICAL HRNA 5 YRS/> REDUCIBLE N/A 7/8/2021    Procedure: REPAIR HERNIA UMBILICAL WITH MESH;  Surgeon: Pietro Smith MD;  Location: [facility];  Service: General   • TRANSURETHRAL RESECTION OF PROSTATE       Social History   Social History     Substance and Sexual Activity   Alcohol Use Yes   • Alcohol/week: 1 0 standard drink   • Types: 1 Glasses of wine per week    Comment: 1 per week     Social History     Substance and Sexual Activity   Drug Use Never     Social History     Tobacco Use   Smoking Status Never   Smokeless Tobacco Never     Family History:   Family History   Problem Relation Age of Onset   • Diabetes Mother    • Heart disease Father    • Heart failure Father    • Prostate cancer Father    • No Known Problems Sister        Meds/Allergies   Current Outpatient Medications   Medication Sig Dispense Refill   • amLODIPine (NORVASC) 10 mg tablet Take 1 tablet (10 mg total) by mouth daily 90 tablet 0   • Ascorbic Acid (VITAMIN C) 500 MG CAPS Take 1 tablet by mouth daily     • aspirin 81 MG tablet Take 81 mg by mouth daily  • atorvastatin (LIPITOR) 40 mg tablet Take 1 tablet (40 mg total) by mouth daily at bedtime 90 tablet 0   • B Complex Vitamins (VITAMIN B COMPLEX PO) Take by mouth     • B-D UF III MINI PEN NEEDLES 31G X 5 MM MISC USE 1 DAILY  5   • Basaglar KwikPen 100 units/mL SOPN Inject 0 55 mL (55 Units total) under the skin daily 49 5 mL 2   • Cholecalciferol 50 MCG (2000 UT) CAPS Take 2 tablets by mouth 2 (two) times a day Once daily     • Cinnamon 500 MG TABS Take by mouth 2 (two) times a day      • coenzyme Q-10 100 MG capsule Take 2 capsules by mouth daily     • Cyanocobalamin (Vitamin B-12) 2500 MCG SUBL Place under the tongue     • Ferrous Sulfate (Iron) 325 (65 Fe) MG TABS Take by mouth 2 (two) times a day     • GARLIC PO Take by mouth     • glipiZIDE (GLUCOTROL XL) 10 mg 24 hr tablet Take 5 mg by mouth daily      • Icosapent Ethyl 1 g CAPS Take 2 capsules (2 g total) by mouth 2 (two) times a day before lunch and dinner 360 capsule 2   • Januvia 100 MG tablet Take 1 tablet (100 mg total) by mouth daily 90 tablet 2   • Jardiance 10 MG TABS tablet Take 1 tablet (10 mg total) by mouth daily 90 tablet 2   • loratadine (CLARITIN) 10 mg tablet Take 10 mg by mouth daily     • metFORMIN (GLUCOPHAGE-XR) 500 mg 24 hr tablet Take 2 tablets with breakfast and 2 tablets with dinner 360 tablet 2   • metoprolol succinate (TOPROL-XL) 100 mg 24 hr tablet TAKE 1 TABLET BY MOUTH DAILY 90 tablet 3   • Multiple Vitamin (MULTI-VITAMIN DAILY) TABS Take 1 tablet by mouth daily     • olmesartan (BENICAR) 40 mg tablet TAKE 1 TABLET BY MOUTH EVERY DAY 90 tablet 2   • pyridostigmine (MESTINON) 60 mg tablet Take 60 mg by mouth 3 (three) times a day  • triamterene-hydrochlorothiazide (MAXZIDE-25) 37 5-25 mg per tablet TAKE 1 TABLET BY MOUTH EVERY DAY 90 tablet 3   • TURMERIC PO Take by mouth     • zinc gluconate 50 mg tablet Take 50 mg by mouth daily       No current facility-administered medications for this visit       No Known Allergies    Objective   Vitals: Blood pressure 138/72, pulse 88, temperature (!) 97 4 °F (36 3 °C), temperature source Temporal, height 5' 6" (1 676 m), weight 89 5 kg (197 lb 6 4 oz), SpO2 94 %  Physical Exam  Vitals reviewed  Constitutional:       Appearance: He is diaphoretic  HENT:      Head: Normocephalic and atraumatic  Eyes:      Extraocular Movements: Extraocular movements intact  Comments: Right eye lid more closed   Neck:      Thyroid: No thyromegaly  Vascular: No carotid bruit  Cardiovascular:      Rate and Rhythm: Normal rate and regular rhythm  Pulses: Normal pulses  Dorsalis pedis pulses are 2+ on the right side and 2+ on the left side  Heart sounds: Normal heart sounds  No murmur heard  No friction rub  No gallop  Pulmonary:      Effort: Pulmonary effort is normal       Breath sounds: Normal breath sounds  No stridor  No wheezing, rhonchi or rales  Abdominal:      General: Bowel sounds are normal       Palpations: Abdomen is soft  There is no mass  Musculoskeletal:         General: No swelling or deformity  Cervical back: No tenderness  Right lower leg: No edema  Left lower leg: No edema  Right foot: No deformity  Left foot: No deformity  Feet:      Right foot:      Skin integrity: Skin integrity normal       Toenail Condition: Right toenails are normal       Left foot:      Skin integrity: Skin integrity normal       Toenail Condition: Left toenails are normal    Lymphadenopathy:      Cervical: No cervical adenopathy  Skin:     General: Skin is warm  Coloration: Skin is not jaundiced  Findings: No rash  Neurological:      General: No focal deficit present  Mental Status: He is alert and oriented to person, place, and time  Psychiatric:         Mood and Affect: Mood normal          Behavior: Behavior normal          The history was obtained from the review of the chart, patient      Lab Results:   Lab Results   Component Value Date/Time    Hemoglobin A1C 6 2 (H) 01/04/2023 08:37 AM    Hemoglobin A1C 5 9 (H) 09/28/2022 07:54 AM    Hemoglobin A1C 6 2 (H) 02/15/2022 08:14 AM    BUN 15 01/04/2023 08:37 AM    BUN 14 09/28/2022 07:54 AM    BUN 14 02/15/2022 08:14 AM    Potassium 3 8 01/04/2023 08:37 AM    Potassium 3 9 09/28/2022 07:54 AM    Potassium 3 8 02/15/2022 08:14 AM    Chloride 104 01/04/2023 08:37 AM    Chloride 107 09/28/2022 07:54 AM    Chloride 107 02/15/2022 08:14 AM    CO2 32 01/04/2023 08:37 AM    CO2 33 (H) 09/28/2022 07:54 AM    CO2 32 02/15/2022 08:14 AM    Creatinine 1 11 01/04/2023 08:37 AM    Creatinine 1 15 09/28/2022 07:54 AM    Creatinine 1 18 02/15/2022 08:14 AM    AST 16 09/28/2022 07:54 AM    ALT 23 09/28/2022 07:54 AM    Albumin 4 0 09/28/2022 07:54 AM    HDL, Direct 44 01/04/2023 08:37 AM    HDL, Direct 44 02/15/2022 08:14 AM    Triglycerides 128 01/04/2023 08:37 AM    Triglycerides 130 02/15/2022 08:14 AM           Imaging Studies: I have personally reviewed pertinent reports  Portions of the record may have been created with voice recognition software  Occasional wrong word or "sound a like" substitutions may have occurred due to the inherent limitations of voice recognition software  Read the chart carefully and recognize, using context, where substitutions have occurred

## 2023-02-28 LAB
LEFT EYE DIABETIC RETINOPATHY: POSITIVE
RIGHT EYE DIABETIC RETINOPATHY: POSITIVE

## 2023-03-05 DIAGNOSIS — I10 ESSENTIAL HYPERTENSION: ICD-10-CM

## 2023-03-06 RX ORDER — AMLODIPINE BESYLATE 10 MG/1
TABLET ORAL
Qty: 90 TABLET | Refills: 0 | Status: SHIPPED | OUTPATIENT
Start: 2023-03-06

## 2023-03-27 RX ORDER — INSULIN GLARGINE 100 [IU]/ML
INJECTION, SOLUTION SUBCUTANEOUS
COMMUNITY
Start: 2023-03-06 | End: 2023-08-31

## 2023-03-27 RX ORDER — COVID-19 ANTIGEN TEST
KIT MISCELLANEOUS
COMMUNITY
Start: 2023-03-18

## 2023-04-06 ENCOUNTER — APPOINTMENT (OUTPATIENT)
Dept: LAB | Facility: AMBULARY SURGERY CENTER | Age: 63
End: 2023-04-06

## 2023-04-06 DIAGNOSIS — Z79.4 TYPE 2 DIABETES MELLITUS WITHOUT COMPLICATION, WITH LONG-TERM CURRENT USE OF INSULIN (HCC): ICD-10-CM

## 2023-04-06 DIAGNOSIS — E11.9 TYPE 2 DIABETES MELLITUS WITHOUT COMPLICATION, WITH LONG-TERM CURRENT USE OF INSULIN (HCC): ICD-10-CM

## 2023-04-06 DIAGNOSIS — I10 ESSENTIAL HYPERTENSION: ICD-10-CM

## 2023-04-06 LAB
CREAT UR-MCNC: 181 MG/DL
EST. AVERAGE GLUCOSE BLD GHB EST-MCNC: 131 MG/DL
HBA1C MFR BLD: 6.2 %
MICROALBUMIN UR-MCNC: 38.7 MG/L (ref 0–20)
MICROALBUMIN/CREAT 24H UR: 21 MG/G CREATININE (ref 0–30)

## 2023-05-10 ENCOUNTER — OFFICE VISIT (OUTPATIENT)
Age: 63
End: 2023-05-10

## 2023-05-10 VITALS
DIASTOLIC BLOOD PRESSURE: 90 MMHG | TEMPERATURE: 97.6 F | BODY MASS INDEX: 30.95 KG/M2 | WEIGHT: 192.6 LBS | HEART RATE: 85 BPM | SYSTOLIC BLOOD PRESSURE: 130 MMHG | HEIGHT: 66 IN | OXYGEN SATURATION: 93 %

## 2023-05-10 DIAGNOSIS — E11.65 TYPE 2 DIABETES MELLITUS WITH HYPERGLYCEMIA, WITHOUT LONG-TERM CURRENT USE OF INSULIN (HCC): Primary | ICD-10-CM

## 2023-05-10 DIAGNOSIS — I10 ESSENTIAL HYPERTENSION: ICD-10-CM

## 2023-05-10 DIAGNOSIS — E78.5 DYSLIPIDEMIA: ICD-10-CM

## 2023-05-10 DIAGNOSIS — E11.9 DIABETES MELLITUS WITHOUT COMPLICATION (HCC): ICD-10-CM

## 2023-05-10 RX ORDER — GLIPIZIDE 5 MG/1
5 TABLET, FILM COATED, EXTENDED RELEASE ORAL DAILY
Qty: 90 TABLET | Refills: 3 | Status: SHIPPED | OUTPATIENT
Start: 2023-05-10 | End: 2023-08-08

## 2023-05-10 RX ORDER — ICOSAPENT ETHYL 1000 MG/1
2 CAPSULE ORAL
Qty: 360 CAPSULE | Refills: 2 | Status: SHIPPED | OUTPATIENT
Start: 2023-05-10

## 2023-05-10 RX ORDER — TRIAMTERENE AND HYDROCHLOROTHIAZIDE 37.5; 25 MG/1; MG/1
1 TABLET ORAL DAILY
Qty: 90 TABLET | Refills: 0 | Status: SHIPPED | OUTPATIENT
Start: 2023-05-10

## 2023-05-10 NOTE — PROGRESS NOTES
Ayse Koch 61 y o  male MRN: [id number]    Encounter: [id number]      Assessment/Plan     Assessment: This is a 61y o -year-old male for follow up on    1-T2DM : still well controlled with A1c of 6 2%  He denies having hypoglycemic symptoms  He has gall stone ( diagnosed yrs ago) and reminded him about DM , meds and risk of getting pancreatitis  Otherwise he is on all appropriate meds and will stay on them  2-proteinuria: on 3 agents  He was given the option of seeing a nephrologist and he prefers to wait for time being  3-dyslipidemia:on 2 agents with no ASVD events  4-rectal carcinoid: released from Dr Vill[facility]  5-myasthenia gravis: under care of a neurologist and controlled on Mestinon  Last chest CT in 2021 and (-) for mediastinal mass  Plan:  All meds same  RTV in 3 months with A1c,lipid panel,CMP, TSH    CC: Diabetes    History of Present Illness     HPI:  T2DM more than 10 yrs,dyslipidemia, HTN, proteinuria     Review of Systems   Constitutional: Negative for appetite change, fatigue and unexpected weight change  HENT: Negative for trouble swallowing  Eyes: Negative for visual disturbance  Respiratory: Negative for cough, chest tightness and shortness of breath  Cardiovascular: Negative for chest pain, palpitations and leg swelling  Gastrointestinal: Negative for abdominal distention, abdominal pain, blood in stool, constipation, diarrhea, nausea and vomiting  Endocrine: Negative for polyphagia and polyuria  Genitourinary: Negative for dysuria and frequency  Musculoskeletal: Negative for arthralgias  Skin: Negative for rash and wound  Neurological: Negative for weakness, numbness and headaches  Hematological: Does not bruise/bleed easily  Psychiatric/Behavioral: Negative for confusion         Historical Information   Past Medical History:   Diagnosis Date   • BPH (benign prostatic hyperplasia)    • Carcinoid tumor of colon    • Carcinoid tumor of rectum • Colon polyp    • CPAP (continuous positive airway pressure) dependence    • Diabetes mellitus (HCC)    • Diabetic eye exam ([facility] [address] ) 2020   • Hyperlipidemia    • Hypernatremia    • Hypertension    • Iron deficiency    • Liver hemangioma    • [facility]asthenia gravis [facility] )    • Nephropathy    • Retinopathy    • Sleep apnea      Past Surgical History:   Procedure Laterality Date   • BACK SURGERY  01/01/2001    lump removed    • COLONOSCOPY      1/1/12,1/1/2016 tumor removed    • DENTAL SURGERY     • FL RETROGRADE PYELOGRAM  6/25/2020   • NASAL POLYP EXCISION     • SD COLONOSCOPY FLX DX W/COLLJ SPEC WHEN PFRMD N/A 7/19/2016    Procedure: COLONOSCOPY;  Surgeon: Ileana Olvera MD;  Location: [facility]; Service: Gastroenterology   • SD CYSTO BLADDER W/URETERAL CATHETERIZATION Bilateral 6/25/2020    Procedure: CYSTOSCOPY WITH RETROGRADE PYELOGRAM;  Surgeon: Robert Hewitt MD;  Location: [facility];  Service: Urology   • SD CYSTO W/REMOVAL OF LESIONS SMALL N/A 6/25/2020    Procedure: TRANSURETHRAL RESECTION OF BLADDER TUMOR (TURBT);   Surgeon: Robert Hewitt MD;  [facility];  Service: Urology   • SD RPR UMBILICAL HRNA 5 YRS/> REDUCIBLE N/A 7/8/2021    Procedure: REPAIR HERNIA UMBILICAL WITH MESH;  Surgeon: Annita Andujar MD;  Location:  MAIN OR;  Service: General   • TRANSURETHRAL RESECTION OF PROSTATE       Social History   Social History     Substance and Sexual Activity   Alcohol Use Yes   • Alcohol/week: 1 0 standard drink   • Types: 1 Glasses of wine per week    Comment: 1 per week     Social History     Substance and Sexual Activity   Drug Use Never     Social History     Tobacco Use   Smoking Status Never   Smokeless Tobacco Never     Family History:   Family History   Problem Relation Age of Onset   • Diabetes Mother    • Cerebral aneurysm Mother    • Heart disease Father    • Heart failure Father    • Prostate cancer Father    • No Known Problems Sister        Meds/Allergies   Current Outpatient Medications   Medication Sig Dispense Refill   • amLODIPine (NORVASC) 10 mg tablet TAKE 1 TABLET BY MOUTH EVERY DAY 90 tablet 0   • Ascorbic Acid (VITAMIN C) 500 MG CAPS Take 1 tablet by mouth daily     • aspirin 81 MG tablet Take 81 mg by mouth daily  • atorvastatin (LIPITOR) 40 mg tablet Take 1 tablet (40 mg total) by mouth daily at bedtime 90 tablet 2   • B Complex Vitamins (VITAMIN B COMPLEX PO) Take by mouth     • B-D UF III MINI PEN NEEDLES 31G X 5 MM MISC USE 1 DAILY  5   • Basaglar KwikPen 100 units/mL SOPN Inject 55 units in morning before breakfast     • Cholecalciferol 50 MCG (2000 UT) CAPS Take 2 tablets by mouth 2 (two) times a day Once daily     • Cinnamon 500 MG TABS Take by mouth 2 (two) times a day      • coenzyme Q-10 100 MG capsule Take 2 capsules by mouth daily     • [employer] Covid-19 At Home Test Kit KIT FOLLOW INSTRUCTIONS INCLUDED WITH THE PACKAGE       • Cyanocobalamin (Vitamin B-12) 2500 MCG SUBL Place under the tongue     • Ferrous Sulfate (Iron) 325 (65 Fe) MG TABS Take by mouth 2 (two) times a day     • GARLIC PO Take by mouth     • glipiZIDE (GLUCOTROL XL) 10 mg 24 hr tablet Take 5 mg by mouth daily One tablet daily     • Icosapent Ethyl 1 g CAPS Take 2 capsules (2 g total) by mouth 2 (two) times a day before lunch and dinner 360 capsule 2   • Januvia 100 MG tablet Take 1 tablet (100 mg total) by mouth daily 90 tablet 2   • Jardiance 10 MG TABS tablet Take 1 tablet (10 mg total) by mouth daily 90 tablet 2   • loratadine (CLARITIN) 10 mg tablet Take 10 mg by mouth daily     • metFORMIN (GLUCOPHAGE-XR) 500 mg 24 hr tablet Take 2 tablets with breakfast and 2 tablets with dinner 360 tablet 2   • metoprolol succinate (TOPROL-XL) 100 mg 24 hr tablet TAKE 1 TABLET BY MOUTH DAILY 90 tablet 3   • Multiple Vitamin (MULTI-VITAMIN DAILY) TABS Take 1 tablet by mouth daily     • olmesartan (BENICAR) 40 mg tablet TAKE 1 TABLET BY MOUTH EVERY DAY 90 tablet 2   • pyridostigmine (MESTINON) 60 mg tablet Take "60 mg by mouth 3 (three) times a day  • triamterene-hydrochlorothiazide (MAXZIDE-25) 37 5-25 mg per tablet TAKE 1 TABLET BY MOUTH EVERY DAY 90 tablet 3   • TURMERIC PO Take by mouth     • zinc gluconate 50 mg tablet Take 50 mg by mouth daily       No current facility-administered medications for this visit  No Known Allergies    Objective   Vitals: Blood pressure 130/90, pulse 85, temperature 97 6 °F (36 4 °C), temperature source Temporal, height 5' 6\" (1 676 m), weight 87 4 kg (192 lb 9 6 oz), SpO2 93 %  Physical Exam  Vitals reviewed  Constitutional:       Appearance: He is obese  HENT:      Head: Normocephalic and atraumatic  Eyes:      Extraocular Movements: Extraocular movements intact  Neck:      Vascular: No carotid bruit  Cardiovascular:      Rate and Rhythm: Normal rate and regular rhythm  Pulses: Normal pulses  Dorsalis pedis pulses are 2+ on the right side and 2+ on the left side  Heart sounds: Normal heart sounds  No murmur heard  No gallop  Pulmonary:      Effort: Pulmonary effort is normal       Breath sounds: Normal breath sounds  No wheezing or rales  Abdominal:      General: Bowel sounds are normal       Palpations: Abdomen is soft  There is no mass  Tenderness: There is no abdominal tenderness  Musculoskeletal:         General: No swelling or deformity  Cervical back: No tenderness  Right lower leg: No edema  Left lower leg: No edema  Right foot: No deformity  Left foot: No deformity  Feet:      Right foot:      Protective Sensation: 8 sites tested  8 sites sensed  Skin integrity: Skin integrity normal       Toenail Condition: Right toenails are normal       Left foot:      Protective Sensation: 8 sites tested  8 sites sensed  Skin integrity: Skin integrity normal       Toenail Condition: Left toenails are normal    Lymphadenopathy:      Cervical: No cervical adenopathy  Skin:     General: Skin is warm      " "  Coloration: Skin is not jaundiced  Findings: No rash  Neurological:      General: No focal deficit present  Mental Status: He is alert and oriented to person, place, and time  Psychiatric:         Mood and Affect: Mood normal          Behavior: Behavior normal          The history was obtained from the review of the chart, patient  Lab Results:   Lab Results   Component Value Date/Time    Hemoglobin A1C 6 2 (H) 04/06/2023 08:16 AM    Hemoglobin A1C 6 2 (H) 01/04/2023 08:37 AM    Hemoglobin A1C 5 9 (H) 09/28/2022 07:54 AM    BUN 15 01/04/2023 08:37 AM    BUN 14 09/28/2022 07:54 AM    Potassium 3 8 01/04/2023 08:37 AM    Potassium 3 9 09/28/2022 07:54 AM    Chloride 104 01/04/2023 08:37 AM    Chloride 107 09/28/2022 07:54 AM    CO2 32 01/04/2023 08:37 AM    CO2 33 (H) 09/28/2022 07:54 AM    Creatinine 1 11 01/04/2023 08:37 AM    Creatinine 1 15 09/28/2022 07:54 AM    AST 16 09/28/2022 07:54 AM    ALT 23 09/28/2022 07:54 AM    Albumin 4 0 09/28/2022 07:54 AM    HDL, Direct 44 01/04/2023 08:37 AM    Triglycerides 128 01/04/2023 08:37 AM           Imaging Studies: I have personally reviewed pertinent reports  Portions of the record may have been created with voice recognition software  Occasional wrong word or \"sound a like\" substitutions may have occurred due to the inherent limitations of voice recognition software  Read the chart carefully and recognize, using context, where substitutions have occurred    "

## 2023-05-30 LAB
LEFT EYE DIABETIC RETINOPATHY: POSITIVE
RIGHT EYE DIABETIC RETINOPATHY: POSITIVE

## 2023-06-16 DIAGNOSIS — E11.9 TYPE 2 DIABETES MELLITUS WITHOUT COMPLICATION, WITH LONG-TERM CURRENT USE OF INSULIN (HCC): Primary | ICD-10-CM

## 2023-06-16 DIAGNOSIS — Z79.4 TYPE 2 DIABETES MELLITUS WITHOUT COMPLICATION, WITH LONG-TERM CURRENT USE OF INSULIN (HCC): Primary | ICD-10-CM

## 2023-06-16 RX ORDER — FLURBIPROFEN SODIUM 0.3 MG/ML
SOLUTION/ DROPS OPHTHALMIC
Qty: 360 EACH | Refills: 3 | Status: SHIPPED | OUTPATIENT
Start: 2023-06-16

## 2023-07-03 DIAGNOSIS — I10 ESSENTIAL (PRIMARY) HYPERTENSION: ICD-10-CM

## 2023-07-03 DIAGNOSIS — I10 ESSENTIAL HYPERTENSION: ICD-10-CM

## 2023-07-03 PROCEDURE — 4010F ACE/ARB THERAPY RXD/TAKEN: CPT | Performed by: FAMILY MEDICINE

## 2023-07-03 RX ORDER — METOPROLOL SUCCINATE 100 MG/1
TABLET, EXTENDED RELEASE ORAL
Qty: 90 TABLET | Refills: 3 | Status: SHIPPED | OUTPATIENT
Start: 2023-07-03

## 2023-07-03 RX ORDER — OLMESARTAN MEDOXOMIL 40 MG/1
TABLET ORAL
Qty: 90 TABLET | Refills: 2 | Status: SHIPPED | OUTPATIENT
Start: 2023-07-03

## 2023-07-06 ENCOUNTER — OFFICE VISIT (OUTPATIENT)
Dept: FAMILY MEDICINE CLINIC | Facility: CLINIC | Age: 63
End: 2023-07-06
Payer: COMMERCIAL

## 2023-07-06 VITALS
HEIGHT: 66 IN | DIASTOLIC BLOOD PRESSURE: 70 MMHG | BODY MASS INDEX: 30.7 KG/M2 | SYSTOLIC BLOOD PRESSURE: 130 MMHG | HEART RATE: 83 BPM | OXYGEN SATURATION: 97 % | WEIGHT: 191 LBS | TEMPERATURE: 97.6 F | RESPIRATION RATE: 16 BRPM

## 2023-07-06 DIAGNOSIS — M62.08 DIASTASIS OF RECTUS ABDOMINIS: ICD-10-CM

## 2023-07-06 DIAGNOSIS — E78.5 DYSLIPIDEMIA: ICD-10-CM

## 2023-07-06 DIAGNOSIS — Z85.040 HISTORY OF MALIGNANT CARCINOID TUMOR OF RECTUM: ICD-10-CM

## 2023-07-06 DIAGNOSIS — Z00.00 ANNUAL PHYSICAL EXAM: ICD-10-CM

## 2023-07-06 DIAGNOSIS — G70.00 MYASTHENIA GRAVIS (HCC): Primary | ICD-10-CM

## 2023-07-06 DIAGNOSIS — E11.9 TYPE 2 DIABETES MELLITUS WITHOUT COMPLICATION, WITHOUT LONG-TERM CURRENT USE OF INSULIN (HCC): ICD-10-CM

## 2023-07-06 DIAGNOSIS — Z82.49 FAMILY HISTORY OF CEREBRAL ANEURYSM: ICD-10-CM

## 2023-07-06 DIAGNOSIS — I10 ESSENTIAL HYPERTENSION: ICD-10-CM

## 2023-07-06 DIAGNOSIS — N40.0 BENIGN PROSTATIC HYPERPLASIA WITHOUT LOWER URINARY TRACT SYMPTOMS: ICD-10-CM

## 2023-07-06 DIAGNOSIS — N18.2 STAGE 2 CHRONIC KIDNEY DISEASE: ICD-10-CM

## 2023-07-06 DIAGNOSIS — Z12.5 PROSTATE CANCER SCREENING: ICD-10-CM

## 2023-07-06 PROCEDURE — 99214 OFFICE O/P EST MOD 30 MIN: CPT | Performed by: FAMILY MEDICINE

## 2023-07-06 PROCEDURE — 99396 PREV VISIT EST AGE 40-64: CPT | Performed by: FAMILY MEDICINE

## 2023-07-06 PROCEDURE — 3066F NEPHROPATHY DOC TX: CPT | Performed by: FAMILY MEDICINE

## 2023-07-06 PROCEDURE — 3725F SCREEN DEPRESSION PERFORMED: CPT | Performed by: FAMILY MEDICINE

## 2023-07-06 NOTE — ASSESSMENT & PLAN NOTE
Mother passed age 80 of aneurysm  Pt not sure if he wants MRA brain pt will check insurance re coverage can also discuss  With neurology

## 2023-07-06 NOTE — PROGRESS NOTES
Name: Kyler Purcell      : 1960      MRN: [id number]  Encounter Provider: Leona Gaitan MD  Encounter Date: 2023   Encounter department: [facility] [address] MEDICINE    Assessment & Plan     1. Myasthenia gravis [facility])  Assessment & Plan:  Reviewed neurology note ok on meds      2. Essential hypertension  Assessment & Plan:  Well controlled on current therapy continue with current medications and will reassess next visit        3. Type 2 diabetes mellitus without complication, without long-term current use of insulin (HCC)  Assessment & Plan:    Lab Results   Component Value Date    HGBA1C 6.2 (H) 2023   reviewed endo note       4. Benign prostatic hyperplasia without lower urinary tract symptoms  Assessment & Plan:  Pt  Sees dr Marquez March urology due in       5. Prostate cancer screening  -     PSA, Total Screen; Future    6. Dyslipidemia  Assessment & Plan:  Well controlled on current therapy continue with current medications and will reassess next visit        7. History of malignant carcinoid tumor of rectum  Assessment & Plan:  Released from oncology surveillance      8. Stage 2 chronic kidney disease  Assessment & Plan:  Lab Results   Component Value Date    EGFR 70 2023    EGFR 67 2022    EGFR 66 02/15/2022    CREATININE 1.11 2023    CREATININE 1.15 2022    CREATININE 1.18 02/15/2022   stable      9. Annual physical exam  Assessment & Plan:  Routine labs mostly done  CBC PSA UA    Orders:  -     CBC and differential; Future  -     Urinalysis with microscopic    10. Family history of cerebral aneurysm  Assessment & Plan: Mother passed age 80 of aneurysm  Pt not sure if he wants MRA brain pt will check insurance re coverage can also discuss  With neurology       11.  Diastasis of rectus abdominis  Assessment & Plan:  Not causong pt any trouble at this time will monitor             Subjective      HPI Patient here for annual physical/Pt is here for interval visit and evaluation of multiple medical problems, review of medications, labs, Health Maintenance and any recent specialty consults      Review of Systems   Constitutional: Negative for appetite change, chills, fatigue and fever. Respiratory: Negative for cough, chest tightness and shortness of breath. Cardiovascular: Negative for chest pain, palpitations and leg swelling. Gastrointestinal: Negative for abdominal pain, constipation, diarrhea, nausea and vomiting. Genitourinary: Negative for difficulty urinating and frequency. Musculoskeletal: Negative for arthralgias, back pain, gait problem and neck pain. Skin: Negative for rash. Neurological: Negative for dizziness, weakness, light-headedness, numbness and headaches. Hematological: Does not bruise/bleed easily. Psychiatric/Behavioral: Negative for dysphoric mood and sleep disturbance. The patient is not nervous/anxious. Current Outpatient Medications on File Prior to Visit   Medication Sig   • amLODIPine (NORVASC) 10 mg tablet TAKE 1 TABLET BY MOUTH EVERY DAY   • Ascorbic Acid (VITAMIN C) 500 MG CAPS Take 1 tablet by mouth daily   • aspirin 81 MG tablet Take 81 mg by mouth daily.    • atorvastatin (LIPITOR) 40 mg tablet Take 1 tablet (40 mg total) by mouth daily at bedtime   • B Complex Vitamins (VITAMIN B COMPLEX PO) Take by mouth   • B-D UF III MINI PEN NEEDLES 31G X 5 MM MISC Use as prescribed   • Basaglar KwikPen 100 units/mL SOPN Inject 55 units in morning before breakfast   • Cholecalciferol 50 MCG (2000 UT) CAPS Take 2 tablets by mouth 2 (two) times a day Once daily   • Cinnamon 500 MG TABS Take by mouth 2 (two) times a day    • coenzyme Q-10 100 MG capsule Take 2 capsules by mouth daily   • Cyanocobalamin (Vitamin B-12) 2500 MCG SUBL Place under the tongue   • Ferrous Sulfate (Iron) 325 (65 Fe) MG TABS Take by mouth 2 (two) times a day   • GARLIC PO Take by mouth   • glipiZIDE (GLUCOTROL XL) 5 mg 24 hr tablet Take 1 tablet (5 mg total) by mouth daily   • Icosapent Ethyl 1 g CAPS Take 2 capsules (2 g total) by mouth 2 (two) times a day before lunch and dinner   • Januvia 100 MG tablet Take 1 tablet (100 mg total) by mouth daily   • Jardiance 10 MG TABS tablet Take 1 tablet (10 mg total) by mouth daily   • loratadine (CLARITIN) 10 mg tablet Take 10 mg by mouth daily   • metFORMIN (GLUCOPHAGE-XR) 500 mg 24 hr tablet Take 2 tablets with breakfast and 2 tablets with dinner   • metoprolol succinate (TOPROL-XL) 100 mg 24 hr tablet TAKE 1 TABLET BY MOUTH EVERY DAY   • Multiple Vitamin (MULTI-VITAMIN DAILY) TABS Take 1 tablet by mouth daily   • olmesartan (BENICAR) 40 mg tablet TAKE 1 TABLET BY MOUTH EVERY DAY   • pyridostigmine (MESTINON) 60 mg tablet Take 60 mg by mouth 3 (three) times a day. • triamterene-hydrochlorothiazide (MAXZIDE-25) 37.5-25 mg per tablet Take 1 tablet by mouth daily   • TURMERIC PO Take by mouth   • zinc gluconate 50 mg tablet Take 50 mg by mouth daily   • [employer] Covid-19 At Home Test Kit KIT FOLLOW INSTRUCTIONS INCLUDED WITH THE PACKAGE. (Patient not taking: Reported on 7/6/2023)       Objective     /70 (BP Location: Left arm, Patient Position: Sitting, Cuff Size: Standard)   Pulse 83   Temp 97.6 °F (36.4 °C) (Tympanic)   Resp 16   Ht 5' 6" (1.676 m)   Wt 86.6 kg (191 lb)   SpO2 97%   BMI 30.83 kg/m²     Physical Exam  Vitals reviewed. Constitutional:       General: He is not in acute distress. Appearance: Normal appearance. He is not ill-appearing. HENT:      Right Ear: Tympanic membrane, ear canal and external ear normal.      Left Ear: Tympanic membrane, ear canal and external ear normal.      Nose: Nose normal.   Eyes:      General: Lids are normal.      Extraocular Movements: Extraocular movements intact. Conjunctiva/sclera: Conjunctivae normal.      Pupils: Pupils are equal, round, and reactive to light. Neck:      Thyroid: No thyromegaly. Vascular: No carotid bruit. Cardiovascular:      Rate and Rhythm: Normal rate and regular rhythm. Pulses: Normal pulses. Heart sounds: Normal heart sounds. Pulmonary:      Effort: Pulmonary effort is normal.      Breath sounds: Normal breath sounds. Chest:   Breasts:     Right: Normal.      Left: Normal.   Abdominal:      General: Bowel sounds are normal. There is no distension. Palpations: Abdomen is soft. There is no mass. Tenderness: There is no abdominal tenderness. Hernia: No hernia is present. Comments: Diastasis rectus abdominus    Musculoskeletal:         General: Normal range of motion. Cervical back: Full passive range of motion without pain, normal range of motion and neck supple. Right lower leg: No edema. Left lower leg: No edema. Lymphadenopathy:      Cervical: No cervical adenopathy. Skin:     General: Skin is warm and dry. Comments: No abn moles   Neurological:      General: No focal deficit present. Mental Status: He is alert and oriented to person, place, and time. Mental status is at baseline. Cranial Nerves: No cranial nerve deficit. Sensory: No sensory deficit. Motor: No weakness or tremor. Coordination: Coordination normal.      Gait: Gait normal.      Deep Tendon Reflexes: Reflexes normal.   Psychiatric:         Mood and Affect: Mood normal.         Speech: Speech normal.         Behavior: Behavior normal.     BMI Counseling: Body mass index is 30.83 kg/m². The BMI is above normal. Nutrition recommendations include 3-5 servings of fruits/vegetables daily, reducing fast food intake, consuming healthier snacks, decreasing soda and/or juice intake, moderation in carbohydrate intake and increasing intake of lean protein. Exercise recommendations include exercising 3-5 times per week and strength training exercises.   Vivek Mueller MD

## 2023-07-06 NOTE — ASSESSMENT & PLAN NOTE
Lab Results   Component Value Date    EGFR 70 01/04/2023    EGFR 67 09/28/2022    EGFR 66 02/15/2022    CREATININE 1.11 01/04/2023    CREATININE 1.15 09/28/2022    CREATININE 1.18 02/15/2022   stable

## 2023-07-21 ENCOUNTER — TELEPHONE (OUTPATIENT)
Dept: SLEEP CENTER | Facility: CLINIC | Age: 63
End: 2023-07-21

## 2023-07-21 NOTE — TELEPHONE ENCOUNTER
Patient left message on the nurse line stating that his insurance is not going to cover his CPAP supplies due to non-compliance. Returned call to patient. Patient states that he has been compliant with PAP use. Advised a compliance report would be requested from [address] [place] [address] (called [facility] office, left message requesting compliance and provided fax number). Last office visit 10/5/2022. Next office visit 10/9/2023. Patient formerly seen by Dr. Ning Castro at the private practice.

## 2023-07-24 NOTE — TELEPHONE ENCOUNTER
Spoke to Juan Carlos Root at [address] [place] [address]. Per Juan Carlos Root she reports there is nothing holding up his account and they have been being reimbursed for supplies. She reports also that patient will need his card read to obtain any compliance.    She will contact the patient about the letter he received from insurance     Notified patient [address] [place] [address] will be contacting him

## 2023-08-06 DIAGNOSIS — I10 ESSENTIAL HYPERTENSION: ICD-10-CM

## 2023-08-07 RX ORDER — TRIAMTERENE AND HYDROCHLOROTHIAZIDE 37.5; 25 MG/1; MG/1
1 TABLET ORAL DAILY
Qty: 90 TABLET | Refills: 0 | Status: SHIPPED | OUTPATIENT
Start: 2023-08-07

## 2023-08-10 ENCOUNTER — APPOINTMENT (OUTPATIENT)
Dept: LAB | Facility: AMBULARY SURGERY CENTER | Age: 63
End: 2023-08-10
Payer: COMMERCIAL

## 2023-08-10 DIAGNOSIS — Z12.5 PROSTATE CANCER SCREENING: ICD-10-CM

## 2023-08-10 DIAGNOSIS — E78.5 DYSLIPIDEMIA: ICD-10-CM

## 2023-08-10 DIAGNOSIS — E11.65 TYPE 2 DIABETES MELLITUS WITH HYPERGLYCEMIA, WITHOUT LONG-TERM CURRENT USE OF INSULIN (HCC): ICD-10-CM

## 2023-08-10 DIAGNOSIS — Z00.00 ANNUAL PHYSICAL EXAM: ICD-10-CM

## 2023-08-10 LAB
ALBUMIN SERPL BCP-MCNC: 4.2 G/DL (ref 3.5–5)
ALP SERPL-CCNC: 56 U/L (ref 46–116)
ALT SERPL W P-5'-P-CCNC: 23 U/L (ref 12–78)
ANION GAP SERPL CALCULATED.3IONS-SCNC: 2 MMOL/L
AST SERPL W P-5'-P-CCNC: 14 U/L (ref 5–45)
BACTERIA UR QL AUTO: ABNORMAL /HPF
BASOPHILS # BLD AUTO: 0.05 THOUSANDS/ÂΜL (ref 0–0.1)
BASOPHILS NFR BLD AUTO: 1 % (ref 0–1)
BILIRUB SERPL-MCNC: 1.06 MG/DL (ref 0.2–1)
BILIRUB UR QL STRIP: NEGATIVE
BUN SERPL-MCNC: 14 MG/DL (ref 5–25)
CALCIUM SERPL-MCNC: 9.4 MG/DL (ref 8.3–10.1)
CHLORIDE SERPL-SCNC: 109 MMOL/L (ref 96–108)
CHOLEST SERPL-MCNC: 122 MG/DL
CLARITY UR: CLEAR
CO2 SERPL-SCNC: 32 MMOL/L (ref 21–32)
COLOR UR: YELLOW
CREAT SERPL-MCNC: 1.27 MG/DL (ref 0.6–1.3)
CREAT UR-MCNC: 163 MG/DL
EOSINOPHIL # BLD AUTO: 0.11 THOUSAND/ÂΜL (ref 0–0.61)
EOSINOPHIL NFR BLD AUTO: 2 % (ref 0–6)
ERYTHROCYTE [DISTWIDTH] IN BLOOD BY AUTOMATED COUNT: 13.1 % (ref 11.6–15.1)
GFR SERPL CREATININE-BSD FRML MDRD: 59 ML/MIN/1.73SQ M
GLUCOSE P FAST SERPL-MCNC: 102 MG/DL (ref 65–99)
GLUCOSE UR STRIP-MCNC: ABNORMAL MG/DL
HCT VFR BLD AUTO: 46 % (ref 36.5–49.3)
HDLC SERPL-MCNC: 49 MG/DL
HGB BLD-MCNC: 14.7 G/DL (ref 12–17)
HGB UR QL STRIP.AUTO: NEGATIVE
IMM GRANULOCYTES # BLD AUTO: 0.01 THOUSAND/UL (ref 0–0.2)
IMM GRANULOCYTES NFR BLD AUTO: 0 % (ref 0–2)
KETONES UR STRIP-MCNC: NEGATIVE MG/DL
LDLC SERPL CALC-MCNC: 43 MG/DL (ref 0–100)
LEUKOCYTE ESTERASE UR QL STRIP: NEGATIVE
LYMPHOCYTES # BLD AUTO: 1.32 THOUSANDS/ÂΜL (ref 0.6–4.47)
LYMPHOCYTES NFR BLD AUTO: 25 % (ref 14–44)
MCH RBC QN AUTO: 27.7 PG (ref 26.8–34.3)
MCHC RBC AUTO-ENTMCNC: 32 G/DL (ref 31.4–37.4)
MCV RBC AUTO: 87 FL (ref 82–98)
MICROALBUMIN UR-MCNC: 38.9 MG/L (ref 0–20)
MICROALBUMIN/CREAT 24H UR: 24 MG/G CREATININE (ref 0–30)
MONOCYTES # BLD AUTO: 0.54 THOUSAND/ÂΜL (ref 0.17–1.22)
MONOCYTES NFR BLD AUTO: 10 % (ref 4–12)
NEUTROPHILS # BLD AUTO: 3.31 THOUSANDS/ÂΜL (ref 1.85–7.62)
NEUTS SEG NFR BLD AUTO: 62 % (ref 43–75)
NITRITE UR QL STRIP: NEGATIVE
NON-SQ EPI CELLS URNS QL MICRO: ABNORMAL /HPF
NONHDLC SERPL-MCNC: 73 MG/DL
NRBC BLD AUTO-RTO: 0 /100 WBCS
PH UR STRIP.AUTO: 5.5 [PH]
PLATELET # BLD AUTO: 263 THOUSANDS/UL (ref 149–390)
PMV BLD AUTO: 10.6 FL (ref 8.9–12.7)
POTASSIUM SERPL-SCNC: 3.9 MMOL/L (ref 3.5–5.3)
PROT SERPL-MCNC: 7.7 G/DL (ref 6.4–8.4)
PROT UR STRIP-MCNC: ABNORMAL MG/DL
PSA SERPL-MCNC: 2.6 NG/ML (ref 0–4)
RBC # BLD AUTO: 5.3 MILLION/UL (ref 3.88–5.62)
RBC #/AREA URNS AUTO: ABNORMAL /HPF
SODIUM SERPL-SCNC: 143 MMOL/L (ref 135–147)
SP GR UR STRIP.AUTO: >=1.03 (ref 1–1.03)
TRIGL SERPL-MCNC: 148 MG/DL
TSH SERPL DL<=0.05 MIU/L-ACNC: 1.13 UIU/ML (ref 0.45–4.5)
UROBILINOGEN UR STRIP-ACNC: <2 MG/DL
WBC # BLD AUTO: 5.34 THOUSAND/UL (ref 4.31–10.16)
WBC #/AREA URNS AUTO: ABNORMAL /HPF

## 2023-08-10 PROCEDURE — G0103 PSA SCREENING: HCPCS

## 2023-08-10 PROCEDURE — 80053 COMPREHEN METABOLIC PANEL: CPT

## 2023-08-10 PROCEDURE — 82570 ASSAY OF URINE CREATININE: CPT

## 2023-08-10 PROCEDURE — 83036 HEMOGLOBIN GLYCOSYLATED A1C: CPT

## 2023-08-10 PROCEDURE — 85025 COMPLETE CBC W/AUTO DIFF WBC: CPT

## 2023-08-10 PROCEDURE — 36415 COLL VENOUS BLD VENIPUNCTURE: CPT

## 2023-08-10 PROCEDURE — 80061 LIPID PANEL: CPT

## 2023-08-10 PROCEDURE — 84443 ASSAY THYROID STIM HORMONE: CPT

## 2023-08-10 PROCEDURE — 82043 UR ALBUMIN QUANTITATIVE: CPT

## 2023-08-11 LAB
EST. AVERAGE GLUCOSE BLD GHB EST-MCNC: 131 MG/DL
HBA1C MFR BLD: 6.2 %

## 2023-08-14 DIAGNOSIS — E11.9 DIABETES MELLITUS WITHOUT COMPLICATION (HCC): ICD-10-CM

## 2023-08-14 DIAGNOSIS — E11.65 INADEQUATELY CONTROLLED DIABETES MELLITUS (HCC): ICD-10-CM

## 2023-08-14 RX ORDER — SITAGLIPTIN 100 MG/1
100 TABLET, FILM COATED ORAL DAILY
Qty: 90 TABLET | Refills: 2 | Status: SHIPPED | OUTPATIENT
Start: 2023-08-14

## 2023-08-23 DIAGNOSIS — E11.65 INADEQUATELY CONTROLLED DIABETES MELLITUS (HCC): ICD-10-CM

## 2023-08-29 RX ORDER — EMPAGLIFLOZIN 10 MG/1
10 TABLET, FILM COATED ORAL DAILY
Qty: 90 TABLET | Refills: 0 | Status: SHIPPED | OUTPATIENT
Start: 2023-08-29

## 2023-08-29 RX ORDER — METFORMIN HYDROCHLORIDE 500 MG/1
TABLET, EXTENDED RELEASE ORAL
Qty: 360 TABLET | Refills: 0 | Status: SHIPPED | OUTPATIENT
Start: 2023-08-29

## 2023-08-31 DIAGNOSIS — E11.65 TYPE 2 DIABETES MELLITUS WITH HYPERGLYCEMIA (HCC): ICD-10-CM

## 2023-08-31 RX ORDER — INSULIN GLARGINE 100 [IU]/ML
INJECTION, SOLUTION SUBCUTANEOUS
Qty: 45 ML | Refills: 2 | Status: SHIPPED | OUTPATIENT
Start: 2023-08-31

## 2023-09-11 ENCOUNTER — OFFICE VISIT (OUTPATIENT)
Age: 63
End: 2023-09-11
Payer: COMMERCIAL

## 2023-09-11 VITALS
BODY MASS INDEX: 30.67 KG/M2 | SYSTOLIC BLOOD PRESSURE: 124 MMHG | DIASTOLIC BLOOD PRESSURE: 76 MMHG | WEIGHT: 190.8 LBS | HEIGHT: 66 IN

## 2023-09-11 DIAGNOSIS — E80.6 BILIRUBINEMIA: ICD-10-CM

## 2023-09-11 DIAGNOSIS — E11.9 TYPE 2 DIABETES MELLITUS WITHOUT COMPLICATION, WITHOUT LONG-TERM CURRENT USE OF INSULIN (HCC): Primary | ICD-10-CM

## 2023-09-11 PROCEDURE — 99214 OFFICE O/P EST MOD 30 MIN: CPT | Performed by: INTERNAL MEDICINE

## 2023-09-11 NOTE — PROGRESS NOTES
Liset Koch 61 y.o. male MRN: [id number]    Encounter: [id number]      Assessment/Plan     Assessment: This is a 61y.o.-year-old male for follow up on    1-T2DM: still well controlled with A1c of 6.2%  He monitors his BS x2/d x2/w. His TTQ=012 rodrick , mid day same. He denies getting hyoglycemic symptoms. Updated eye exam.  He is on right track  And will keep him on current regimen for time being. 2-acceptable BP and no proteinuria. But his GFR has dropped about 30% since 2 yrs ago and I may get a nephro consult at next visit. 3-acceptable HDL with no ASVD events. 4-minimally increased total bili and h/ogall stone: would get a abdomen US and repeat the blood test.    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 Plan:  All meds same  Liver US and LFT in one month  RTV in 4 m with A1c    CC: Diabetes    History of Present Illness     HPI:  T2DM with no h/o pancreatitis, myasthenia gravis, rectal carcinoid tumor, dyslipidemia,     Review of Systems   Constitutional: Negative for appetite change, fatigue and unexpected weight change. HENT: Negative for trouble swallowing. Eyes: Negative for visual disturbance. Respiratory: Negative for cough, chest tightness and shortness of breath. Cardiovascular: Negative for chest pain, palpitations and leg swelling. Gastrointestinal: Negative for abdominal distention, abdominal pain, blood in stool, constipation, diarrhea, nausea and vomiting. Endocrine: Negative for polyuria. Genitourinary: Negative for dysuria, frequency and genital sores. Musculoskeletal: Negative for arthralgias. Skin: Negative for rash and wound. Neurological: Negative for weakness, numbness and headaches. Hematological: Does not bruise/bleed easily. Psychiatric/Behavioral: Negative for confusion.        Historical Information   Past Medical History:   Diagnosis Date   • Carcinoid tumor of colon    • Carcinoid tumor of rectum    • Colon polyp    • CPAP (continuous positive airway pressure) dependence    • Diabetes mellitus (HCC)    • Diabetic eye exam ([address]) 2020   • Hyperlipidemia    • Hypernatremia    • Hypertension    • Iron deficiency    • Liver hemangioma    • Myasthenia gravis ([address])    • Nephropathy    • Retinopathy    • Sleep apnea      Past Surgical History:   Procedure Laterality Date   • BACK SURGERY  01/01/2001    lump removed    • COLONOSCOPY      1/1/12,1/1/2016 tumor removed    • DENTAL SURGERY     • FL RETROGRADE PYELOGRAM  6/25/2020   • NASAL POLYP EXCISION     • IA COLONOSCOPY FLX DX W/COLLJ SPEC WHEN PFRMD N/A 7/19/2016    Procedure: COLONOSCOPY;  Surgeon: Pearl Toribio MD;  Location: [facility]; Service: Gastroenterology   • IA CYSTO BLADDER W/URETERAL CATHETERIZATION Bilateral 6/25/2020    Procedure: CYSTOSCOPY WITH RETROGRADE PYELOGRAM;  Surgeon: Rodrigue Tijerina MD;  Location: [facility];  Service: Urology   • IA CYSTO W/REMOVAL OF LESIONS SMALL N/A 6/25/2020    Procedure: TRANSURETHRAL RESECTION OF BLADDER TUMOR (TURBT);   Surgeon: Rodrigue Tijerina MD;  Location: [facility];  Service: Urology   • IA RPR UMBILICAL HRNA 5 YRS/> REDUCIBLE N/A 7/8/2021    Procedure: REPAIR HERNIA UMBILICAL WITH MESH;  Surgeon: Bernardino Turcios MD;  Location:  MAIN OR;  Service: General   • TRANSURETHRAL RESECTION OF PROSTATE       Social History   Social History     Substance and Sexual Activity   Alcohol Use Yes   • Alcohol/week: 1.0 standard drink of alcohol   • Types: 1 Glasses of wine per week    Comment: 1 per week     Social History     Substance and Sexual Activity   Drug Use Never     Social History     Tobacco Use   Smoking Status Never   Smokeless Tobacco Never     Family History:   Family History   Problem Relation Age of Onset   • Diabetes Mother    • Cerebral aneurysm Mother    • Heart disease Father    • Heart failure Father    • Prostate cancer Father    • No Known Problems Sister        Meds/Allergies   Current Outpatient Medications   Medication Sig Dispense Refill   • amLODIPine (NORVASC) 10 mg tablet TAKE 1 TABLET BY MOUTH EVERY DAY 90 tablet 1   • Ascorbic Acid (VITAMIN C) 500 MG CAPS Take 1 tablet by mouth daily     • aspirin 81 MG tablet Take 81 mg by mouth daily. • atorvastatin (LIPITOR) 40 mg tablet Take 1 tablet (40 mg total) by mouth daily at bedtime 90 tablet 2   • B Complex Vitamins (VITAMIN B COMPLEX PO) Take by mouth     • B-D UF III MINI PEN NEEDLES 31G X 5 MM MISC Use as prescribed 360 each 3   • Basaglar KwikPen 100 units/mL SOPN INJECT 0.55 ML (55 UNITS TOTAL) UNDER THE SKIN DAILY 45 mL 2   • Cholecalciferol 50 MCG (2000 UT) CAPS Take 2 tablets by mouth 2 (two) times a day Once daily     • Cinnamon 500 MG TABS Take by mouth 2 (two) times a day      • coenzyme Q-10 100 MG capsule Take 2 capsules by mouth daily     • Cyanocobalamin (Vitamin B-12) 2500 MCG SUBL Place under the tongue     • Ferrous Sulfate (Iron) 325 (65 Fe) MG TABS Take by mouth 2 (two) times a day     • GARLIC PO Take by mouth     • glipiZIDE (GLUCOTROL XL) 5 mg 24 hr tablet Take 1 tablet (5 mg total) by mouth daily 90 tablet 3   • Icosapent Ethyl 1 g CAPS Take 2 capsules (2 g total) by mouth 2 (two) times a day before lunch and dinner 360 capsule 2   • Januvia 100 MG tablet Take 1 tablet (100 mg total) by mouth daily 90 tablet 2   • Jardiance 10 MG TABS tablet Take 1 tablet (10 mg total) by mouth daily 90 tablet 0   • loratadine (CLARITIN) 10 mg tablet Take 10 mg by mouth daily     • metFORMIN (GLUCOPHAGE-XR) 500 mg 24 hr tablet Take 2 tablets with breakfast and 2 tablets with dinner 360 tablet 0   • metoprolol succinate (TOPROL-XL) 100 mg 24 hr tablet TAKE 1 TABLET BY MOUTH EVERY DAY 90 tablet 3   • Multiple Vitamin (MULTI-VITAMIN DAILY) TABS Take 1 tablet by mouth daily     • olmesartan (BENICAR) 40 mg tablet TAKE 1 TABLET BY MOUTH EVERY DAY 90 tablet 2   • pyridostigmine (MESTINON) 60 mg tablet Take 60 mg by mouth 3 (three) times a day.      • triamterene-hydrochlorothiazide (MAXZIDE-25) 37.5-25 mg per tablet TAKE 1 TABLET BY MOUTH EVERY DAY 90 tablet 0   • TURMERIC PO Take by mouth     • zinc gluconate 50 mg tablet Take 50 mg by mouth daily     • [facility] Covid-19 At Home Test Kit [address] [place] [address]. (Patient not taking: Reported on 7/6/2023)       No current facility-administered medications for this visit. No Known Allergies    Objective   Vitals: Blood pressure 124/76, height 5' 6" (1.676 m), weight 86.5 kg (190 lb 12.8 oz). Physical Exam  Vitals reviewed. Constitutional:       Appearance: He is obese. HENT:      Head: Normocephalic and atraumatic. Eyes:      Extraocular Movements: Extraocular movements intact. Neck:      Vascular: No carotid bruit. Cardiovascular:      Rate and Rhythm: Normal rate and regular rhythm. Pulses: Normal pulses. Dorsalis pedis pulses are 2+ on the right side and 2+ on the left side. Heart sounds: Normal heart sounds. No murmur heard. No friction rub. No gallop. Pulmonary:      Effort: Pulmonary effort is normal.      Breath sounds: Normal breath sounds. No wheezing or rales. Abdominal:      General: Bowel sounds are normal.      Palpations: Abdomen is soft. There is no mass. Tenderness: There is no abdominal tenderness. Musculoskeletal:         General: No swelling or deformity. Cervical back: No tenderness. Right lower leg: No edema. Left lower leg: No edema. Right foot: No deformity. Left foot: No deformity. Feet:      Right foot:      Protective Sensation: 8 sites tested. 8 sites sensed. Skin integrity: Skin integrity normal.      Toenail Condition: Right toenails are normal.      Left foot:      Protective Sensation: 8 sites tested. 8 sites sensed. Skin integrity: Skin integrity normal.      Toenail Condition: Left toenails are normal.   Lymphadenopathy:      Cervical: No cervical adenopathy.    Skin: General: Skin is warm. Coloration: Skin is not jaundiced. Findings: No rash. Neurological:      General: No focal deficit present. Mental Status: He is alert and oriented to person, place, and time. Psychiatric:         Mood and Affect: Mood normal.         Behavior: Behavior normal.         The history was obtained from the review of the chart, patient. Lab Results:   Lab Results   Component Value Date/Time    Hemoglobin A1C 6.2 (H) 08/10/2023 08:11 AM    Hemoglobin A1C 6.2 (H) 04/06/2023 08:16 AM    Hemoglobin A1C 6.2 (H) 01/04/2023 08:37 AM    WBC 5.34 08/10/2023 08:11 AM    Hemoglobin 14.7 08/10/2023 08:11 AM    Hematocrit 46.0 08/10/2023 08:11 AM    MCV 87 08/10/2023 08:11 AM    Platelets 116 02/30/5137 08:11 AM    BUN 14 08/10/2023 08:11 AM    BUN 15 01/04/2023 08:37 AM    BUN 14 09/28/2022 07:54 AM    Potassium 3.9 08/10/2023 08:11 AM    Potassium 3.8 01/04/2023 08:37 AM    Potassium 3.9 09/28/2022 07:54 AM    Chloride 109 (H) 08/10/2023 08:11 AM    Chloride 104 01/04/2023 08:37 AM    Chloride 107 09/28/2022 07:54 AM    CO2 32 08/10/2023 08:11 AM    CO2 32 01/04/2023 08:37 AM    CO2 33 (H) 09/28/2022 07:54 AM    Creatinine 1.27 08/10/2023 08:11 AM    Creatinine 1.11 01/04/2023 08:37 AM    Creatinine 1.15 09/28/2022 07:54 AM    AST 14 08/10/2023 08:11 AM    AST 16 09/28/2022 07:54 AM    ALT 23 08/10/2023 08:11 AM    ALT 23 09/28/2022 07:54 AM    Total Protein 7.7 08/10/2023 08:11 AM    Total Protein 7.3 09/28/2022 07:54 AM    Albumin 4.2 08/10/2023 08:11 AM    Albumin 4.0 09/28/2022 07:54 AM    HDL, Direct 49 08/10/2023 08:11 AM    HDL, Direct 44 01/04/2023 08:37 AM    Triglycerides 148 08/10/2023 08:11 AM    Triglycerides 128 01/04/2023 08:37 AM           Imaging Studies: I have personally reviewed pertinent reports. Portions of the record may have been created with voice recognition software.  Occasional wrong word or "sound a like" substitutions may have occurred due to the inherent limitations of voice recognition software. Read the chart carefully and recognize, using context, where substitutions have occurred.

## 2023-09-13 ENCOUNTER — PROCEDURE VISIT (OUTPATIENT)
Dept: UROLOGY | Facility: CLINIC | Age: 63
End: 2023-09-13
Payer: COMMERCIAL

## 2023-09-13 VITALS
HEIGHT: 66 IN | BODY MASS INDEX: 30.53 KG/M2 | SYSTOLIC BLOOD PRESSURE: 140 MMHG | OXYGEN SATURATION: 99 % | HEART RATE: 80 BPM | WEIGHT: 190 LBS | DIASTOLIC BLOOD PRESSURE: 80 MMHG

## 2023-09-13 DIAGNOSIS — D3A.029 CARCINOID TUMOR OF COLON: ICD-10-CM

## 2023-09-13 DIAGNOSIS — N30.80 CYSTITIS GLANDULARIS: Primary | ICD-10-CM

## 2023-09-13 DIAGNOSIS — N13.8 BPH WITH OBSTRUCTION/LOWER URINARY TRACT SYMPTOMS: ICD-10-CM

## 2023-09-13 DIAGNOSIS — N40.1 BPH WITH OBSTRUCTION/LOWER URINARY TRACT SYMPTOMS: ICD-10-CM

## 2023-09-13 DIAGNOSIS — Z85.040 HISTORY OF MALIGNANT CARCINOID TUMOR OF RECTUM: ICD-10-CM

## 2023-09-13 DIAGNOSIS — Z12.5 PROSTATE CANCER SCREENING ENCOUNTER, OPTIONS AND RISKS DISCUSSED: ICD-10-CM

## 2023-09-13 DIAGNOSIS — Z71.2 ENCOUNTER TO DISCUSS TEST RESULTS: ICD-10-CM

## 2023-09-13 PROCEDURE — 99214 OFFICE O/P EST MOD 30 MIN: CPT | Performed by: UROLOGY

## 2023-09-13 PROCEDURE — 52000 CYSTOURETHROSCOPY: CPT | Performed by: UROLOGY

## 2023-09-13 PROCEDURE — 88112 CYTOPATH CELL ENHANCE TECH: CPT | Performed by: PATHOLOGY

## 2023-09-13 NOTE — ASSESSMENT & PLAN NOTE
No change in findings on cystoscopy, mucinous blebs noted, no concerning urothelial changes, urine cytology sent.  Will consider stopping surveillance cystoscopy after June 2025

## 2023-09-13 NOTE — ASSESSMENT & PLAN NOTE
Voiding well at this time, median lobe is present, if he requires surgery for LUTS at some point I would recommend TURP

## 2023-09-13 NOTE — PROGRESS NOTES
Office Cystoscopy Procedure Note    Indication:    cystitis glandularis, surveillance cystoscopy    Informed consent   The risks, benefits, complications, treatment options, and expected outcomes were discussed with the patient. The patient concurred with the proposed plan and provided informed consent. Anesthesia  Lidocaine jelly 2%    Antibiotic prophylaxis   None    Procedure  The patient was placed in the supineposition, was prepped and draped in the usual manner using sterile technique, and 2% lidocaine jelly instilled into the urethra. A 17 F flexible cystoscope was then inserted into the urethra and the urethra and bladder carefully examined. The following findings were noted:    Findings:  Urethra:  Normal  Prostate:  moderate lateral lobe hypertrophy, small median lobe, no lesions  Bladder:  Some mucous filled blebs are noted, stable findings, no concerning mucosal changes and no papillary tumors  Ureteral orifices:  orthotopic  Other findings:  None, retroflexed view confirms    Specimens: None                 Complications:    None; patient tolerated the procedure well           Disposition: To home            Condition: Stable    Plan: Stable cystoscopic findings, no findings concerning for malignant transformation, continue yearly surveillance cystoscopy up to 6/2025       Cystoscopy     Date/Time 9/13/2023 8:30 AM     Performed by  Tessy Platt MD   Authorized by Tessy Platt MD     Universal Protocol:  Consent: Verbal consent obtained. Written consent obtained.   Risks and benefits: risks, benefits and alternatives were discussed  Consent given by: patient  Patient understanding: patient states understanding of the procedure being performed  Patient consent: the patient's understanding of the procedure matches consent given  Procedure consent: procedure consent matches procedure scheduled  Relevant documents: relevant documents present and verified  Test results: test results available and properly labeled  Site marked: the operative site was not marked  Radiology Images displayed and confirmed. If images not available, report reviewed: imaging studies available  Required items: required blood products, implants, devices, and special equipment available  Patient identity confirmed: verbally with patient and provided demographic data        Procedure Details:  Procedure type: cystoscopy    Patient tolerance: Patient tolerated the procedure well with no immediate complications    Additional Procedure Details: Office Cystoscopy Procedure Note    Indication:    cystitis glandularis, surveillance cystoscopy    Informed consent   The risks, benefits, complications, treatment options, and expected outcomes were discussed with the patient. The patient concurred with the proposed plan and provided informed consent. Anesthesia  Lidocaine jelly 2%    Antibiotic prophylaxis   None    Procedure  The patient was placed in the supineposition, was prepped and draped in the usual manner using sterile technique, and 2% lidocaine jelly instilled into the urethra. A 17 F flexible cystoscope was then inserted into the urethra and the urethra and bladder carefully examined.   The following findings were noted:    Findings:  Urethra:  Normal  Prostate:  moderate lateral lobe hypertrophy, small median lobe, no lesions  Bladder:  Some mucous filled blebs are noted, stable findings, no concerning mucosal changes and no papillary tumors  Ureteral orifices:  orthotopic  Other findings:  None, retroflexed view confirms    Specimens: None                 Complications:    None; patient tolerated the procedure well           Disposition: To home            Condition: Stable    Plan: Stable cystoscopic findings, no findings concerning for malignant transformation, continue yearly surveillance cystoscopy up to 6/2025

## 2023-09-15 PROCEDURE — 88112 CYTOPATH CELL ENHANCE TECH: CPT | Performed by: PATHOLOGY

## 2023-10-06 ENCOUNTER — APPOINTMENT (OUTPATIENT)
Dept: LAB | Facility: AMBULARY SURGERY CENTER | Age: 63
End: 2023-10-06
Payer: COMMERCIAL

## 2023-10-06 DIAGNOSIS — E80.6 BILIRUBINEMIA: ICD-10-CM

## 2023-10-06 LAB
ALBUMIN SERPL BCP-MCNC: 4.1 G/DL (ref 3.5–5)
ALP SERPL-CCNC: 53 U/L (ref 34–104)
ALT SERPL W P-5'-P-CCNC: 21 U/L (ref 7–52)
AST SERPL W P-5'-P-CCNC: 24 U/L (ref 13–39)
BILIRUB DIRECT SERPL-MCNC: 0.16 MG/DL (ref 0–0.2)
BILIRUB SERPL-MCNC: 0.72 MG/DL (ref 0.2–1)
PROT SERPL-MCNC: 7.2 G/DL (ref 6.4–8.4)

## 2023-10-06 PROCEDURE — 80076 HEPATIC FUNCTION PANEL: CPT

## 2023-10-06 PROCEDURE — 36415 COLL VENOUS BLD VENIPUNCTURE: CPT

## 2023-10-09 ENCOUNTER — OFFICE VISIT (OUTPATIENT)
Dept: SLEEP CENTER | Facility: CLINIC | Age: 63
End: 2023-10-09
Payer: COMMERCIAL

## 2023-10-09 VITALS
DIASTOLIC BLOOD PRESSURE: 82 MMHG | WEIGHT: 188 LBS | HEIGHT: 66 IN | BODY MASS INDEX: 30.22 KG/M2 | OXYGEN SATURATION: 96 % | HEART RATE: 91 BPM | SYSTOLIC BLOOD PRESSURE: 140 MMHG

## 2023-10-09 DIAGNOSIS — R68.2 DRY MOUTH: ICD-10-CM

## 2023-10-09 DIAGNOSIS — I10 ESSENTIAL HYPERTENSION: ICD-10-CM

## 2023-10-09 DIAGNOSIS — E66.9 OBESITY (BMI 30-39.9): ICD-10-CM

## 2023-10-09 DIAGNOSIS — G47.33 OSA (OBSTRUCTIVE SLEEP APNEA): Primary | ICD-10-CM

## 2023-10-09 DIAGNOSIS — I11.9 HYPERTENSIVE HEART DISEASE WITHOUT HEART FAILURE: ICD-10-CM

## 2023-10-09 DIAGNOSIS — G70.00 MYASTHENIA GRAVIS (HCC): ICD-10-CM

## 2023-10-09 PROCEDURE — 99214 OFFICE O/P EST MOD 30 MIN: CPT | Performed by: INTERNAL MEDICINE

## 2023-10-09 NOTE — PROGRESS NOTES
Follow[facility]Up Note - [facility]   Valencia Koch  61 y.o. male  APD:7/52/8483  QOU:[id number]  DOS:10/9/2023    CC: I saw this patient for follow-up in clinic today for Sleep disordered breathing, Coexisting Sleep and Medical Problems. Interval changes: He uses a ResMed machine    Results of prior studies in 2014: The diagnostic study demonstrated an RDI of 10 per hour, considerably higher during REM at 19 per hour. Minimum oxygen saturation was 87%. During the subsequent therapeutic study, sleep disordered breathing was adequately remediated with nasal CPAP at 11 cm H2O     PFSH, Problem List, Medications & Allergies were reviewed in EMR. He  has a past medical history of Carcinoid tumor of colon, Carcinoid tumor of rectum, Colon polyp, CPAP (continuous positive airway pressure) dependence, Diabetes mellitus ([address]), Diabetic eye exam ([address]) (2020), Hyperlipidemia, Hypernatremia, Hypertension, Iron deficiency, Liver hemangioma, Myasthenia gravis ([address]), Nephropathy, Retinopathy, and Sleep apnea. He has a current medication list which includes the following prescription(s): amlodipine, vitamin c, aspirin, b complex vitamins, b-d uf iii mini pen needles, basaglar kwikpen, cholecalciferol, cinnamon, coenzyme q-10, [facility] covid-19 at home test kit, vitamin L-12, iron, garlic, icosapent ethyl, januvia, jardiance, loratadine, metformin, metoprolol succinate, multi-vitamin daily, olmesartan, pyridostigmine, triamterene-hydrochlorothiazide, turmeric, zinc gluconate, atorvastatin, and glipizide. PHYSIOLOGICAL DATA REVIEW : Device has been used 20/30 days. He did not carry his machine for use when away on vacation. Average on days used is 6 hours and 39 minutes. using PAP > 4 hours/night 67%. Estimated ANA 1.47/hour with pressure of 11-13cm H2O ; patient has not been using non FDA approved devices to sanitize the machine.   INTERPRETATION: Compliance is good; Pressure setting is:optimal; ;   SUBJECTIVE: With respect to use of PAP, Arin Dumont  is experiencing some adverse effects:dry mouth/throat and mask leaks . He derives benefit. Is satisfied with sleep and daytime function. Sleep Routine: Arin Dumont reports getting 7 hrs sleep on workdays and more on days off; he has no difficulty initiating or maintaining sleep . He arises needing an alarm and feels refreshed. Arin Dumont denies excessive daytime sleepiness,. He rated [himself] at Total score: 3 /24 on the [place] Sleepiness Scale. Other issues: None reported. Habits:[ reports that he has never smoked. He has never used smokeless tobacco.], [ reports current alcohol use of about 1.0 standard drink of alcohol per week.], [ reports no history of drug use.], Caffeine use:limited; Exercise routine: regular. ROS: Significant for weight has been stable. Harrel Maxim EXAM: /82 (BP Location: Left arm, Patient Position: Sitting, Cuff Size: Standard)   Pulse 91   Ht 5' 6" (1.676 m)   Wt 85.3 kg (188 lb)   SpO2 96%   BMI 30.34 kg/m²     Wt Readings from Last 3 Encounters:   10/09/23 85.3 kg (188 lb)   09/13/23 86.2 kg (190 lb)   09/11/23 86.5 kg (190 lb 12.8 oz)      Patient is well groomed; well appearing. CNS: Alert, orientated, speech clear & coherent  Psych: cooperative and in no distress. Mental state: Appears normal.  H&N: EOMI; NC/AT: No facial pressure marks, no rashes. Skin/Extrem: col & hydration normal; no edema  Resp: Respiratory effort is normal  Physical findings otherwise essentially unchanged from previous. IMPRESSION: Problem List Items & Comorbidities Addressed this Visit    1. JERO (obstructive sleep apnea)  PAP DME Resupply/Reorder      2. Dry mouth        3. Myasthenia gravis ([address])        4. Essential hypertension        5. Hypertensive heart disease without heart failure        6. Obesity (BMI 30-39. 9)            PLAN:  1. I reviewed results of prior studies and physiologic data with the patient.    2. I discussed treatment options with risks and benefits. 3. Treatment with  PAP is medically necessary and Kendra Donaldson is agreable to continue use. 4. Care of equipment, methods to improve comfort using PAP and importance of compliance with therapy were discussed. 5. Pressure setting: continue 11-13 cmH2O.    6. Rx provided to replace supplies and Care coordinated with DME provider. 7. Discussed strategies for weight reduction. 8. Follow-up is advised in 1 year or sooner if needed to monitor progress, compliance and to adjust therapy. Thank you for allowing me to participate in the care of this patient. Sincerely,     Authenticated electronically on 27/09/98   Board Certified Specialist     Portions of the record may have been created with voice recognition software. Occasional wrong word or "sound a like" substitutions may have occurred due to the inherent limitations of voice recognition software. There may also be notations and random deletions of words or characters from malfunctioning software. Read the chart carefully and recognize, using context, where substitutions/deletions have occurred.

## 2023-10-09 NOTE — PATIENT INSTRUCTIONS

## 2023-10-10 ENCOUNTER — TELEPHONE (OUTPATIENT)
Dept: SLEEP CENTER | Facility: CLINIC | Age: 63
End: 2023-10-10

## 2023-10-30 ENCOUNTER — HOSPITAL ENCOUNTER (OUTPATIENT)
Dept: ULTRASOUND IMAGING | Facility: HOSPITAL | Age: 63
Discharge: HOME | End: 2023-10-30
Attending: INTERNAL MEDICINE
Payer: COMMERCIAL

## 2023-10-30 DIAGNOSIS — E80.6 BILIRUBINEMIA: ICD-10-CM

## 2023-10-30 PROCEDURE — 76700 US EXAM ABDOM COMPLETE: CPT

## 2023-11-01 DIAGNOSIS — E11.65 INADEQUATELY CONTROLLED DIABETES MELLITUS (HCC): ICD-10-CM

## 2023-11-01 DIAGNOSIS — I10 ESSENTIAL HYPERTENSION: ICD-10-CM

## 2023-11-01 RX ORDER — TRIAMTERENE AND HYDROCHLOROTHIAZIDE 37.5; 25 MG/1; MG/1
1 TABLET ORAL DAILY
Qty: 90 TABLET | Refills: 0 | Status: SHIPPED | OUTPATIENT
Start: 2023-11-01

## 2023-11-02 RX ORDER — METFORMIN HYDROCHLORIDE 500 MG/1
TABLET, EXTENDED RELEASE ORAL
Qty: 360 TABLET | Refills: 0 | Status: SHIPPED | OUTPATIENT
Start: 2023-11-02

## 2023-11-26 DIAGNOSIS — I10 ESSENTIAL HYPERTENSION: ICD-10-CM

## 2023-11-27 RX ORDER — AMLODIPINE BESYLATE 10 MG/1
TABLET ORAL
Qty: 90 TABLET | Refills: 1 | Status: SHIPPED | OUTPATIENT
Start: 2023-11-27

## 2023-12-08 ENCOUNTER — APPOINTMENT (OUTPATIENT)
Dept: LAB | Facility: AMBULARY SURGERY CENTER | Age: 63
End: 2023-12-08
Payer: COMMERCIAL

## 2023-12-08 DIAGNOSIS — E11.9 TYPE 2 DIABETES MELLITUS WITHOUT COMPLICATION, WITHOUT LONG-TERM CURRENT USE OF INSULIN (HCC): ICD-10-CM

## 2023-12-08 LAB
EST. AVERAGE GLUCOSE BLD GHB EST-MCNC: 148 MG/DL
HBA1C MFR BLD: 6.8 %

## 2023-12-08 PROCEDURE — 83036 HEMOGLOBIN GLYCOSYLATED A1C: CPT

## 2023-12-08 PROCEDURE — 36415 COLL VENOUS BLD VENIPUNCTURE: CPT

## 2023-12-20 DIAGNOSIS — E78.2 MIXED HYPERLIPIDEMIA: ICD-10-CM

## 2023-12-21 DIAGNOSIS — E11.65 INADEQUATELY CONTROLLED DIABETES MELLITUS (HCC): ICD-10-CM

## 2023-12-21 RX ORDER — EMPAGLIFLOZIN 10 MG/1
10 TABLET, FILM COATED ORAL DAILY
Qty: 90 TABLET | Refills: 3 | Status: CANCELLED | OUTPATIENT
Start: 2023-12-21

## 2023-12-21 RX ORDER — EMPAGLIFLOZIN 10 MG/1
10 TABLET, FILM COATED ORAL DAILY
Qty: 90 TABLET | Refills: 1 | Status: SHIPPED | OUTPATIENT
Start: 2023-12-21

## 2023-12-21 RX ORDER — ATORVASTATIN CALCIUM 40 MG/1
40 TABLET, FILM COATED ORAL
Qty: 90 TABLET | Refills: 2 | Status: SHIPPED | OUTPATIENT
Start: 2023-12-21

## 2024-01-11 ENCOUNTER — OFFICE VISIT (OUTPATIENT)
Dept: FAMILY MEDICINE CLINIC | Facility: CLINIC | Age: 64
End: 2024-01-11
Payer: COMMERCIAL

## 2024-01-11 VITALS
RESPIRATION RATE: 16 BRPM | DIASTOLIC BLOOD PRESSURE: 62 MMHG | WEIGHT: 193 LBS | OXYGEN SATURATION: 99 % | TEMPERATURE: 97.8 F | HEIGHT: 66 IN | SYSTOLIC BLOOD PRESSURE: 118 MMHG | BODY MASS INDEX: 31.02 KG/M2 | HEART RATE: 74 BPM

## 2024-01-11 DIAGNOSIS — K80.20 GALLSTONES: ICD-10-CM

## 2024-01-11 DIAGNOSIS — E11.9 TYPE 2 DIABETES MELLITUS WITHOUT COMPLICATION, WITHOUT LONG-TERM CURRENT USE OF INSULIN (HCC): Primary | ICD-10-CM

## 2024-01-11 DIAGNOSIS — K82.4 GALLBLADDER POLYP: ICD-10-CM

## 2024-01-11 DIAGNOSIS — N18.2 STAGE 2 CHRONIC KIDNEY DISEASE: ICD-10-CM

## 2024-01-11 DIAGNOSIS — I10 ESSENTIAL HYPERTENSION: ICD-10-CM

## 2024-01-11 DIAGNOSIS — G70.00 MYASTHENIA GRAVIS (HCC): ICD-10-CM

## 2024-01-11 DIAGNOSIS — E78.5 DYSLIPIDEMIA: ICD-10-CM

## 2024-01-11 DIAGNOSIS — G47.33 OSA (OBSTRUCTIVE SLEEP APNEA): ICD-10-CM

## 2024-01-11 PROBLEM — Z71.2 ENCOUNTER TO DISCUSS TEST RESULTS: Status: RESOLVED | Noted: 2022-09-06 | Resolved: 2024-01-11

## 2024-01-11 PROBLEM — M54.50 ACUTE BILATERAL LOW BACK PAIN WITHOUT SCIATICA: Status: RESOLVED | Noted: 2022-07-05 | Resolved: 2024-01-11

## 2024-01-11 PROBLEM — E78.2 MIXED HYPERLIPIDEMIA: Status: RESOLVED | Noted: 2022-10-04 | Resolved: 2024-01-11

## 2024-01-11 PROCEDURE — 99214 OFFICE O/P EST MOD 30 MIN: CPT | Performed by: FAMILY MEDICINE

## 2024-01-11 NOTE — PROGRESS NOTES
Name: Garry Koch      : 1960      MRN: [id number]  Encounter Provider: Sejal Adrian MD  Encounter Date: 2024   Encounter department: [facility]    Assessment & Plan     1. Type 2 diabetes mellitus without complication, without long-term current use of insulin (HCC)  Assessment & Plan:    Lab Results   Component Value Date    HGBA1C 6.8 (H) 2023   Endocrinology note reviewed  well controlled       2. JERO (obstructive sleep apnea)  Assessment & Plan:  Reviewed recent sleep study note  on cpap      3. Essential hypertension  Assessment & Plan:  Well controlled on current therapy continue with current medications and will reassess next visit        4. Myasthenia gravis (HCC)  Assessment & Plan:  Reviewed dr Dalton neurology note ok on meds       5. Stage 2 chronic kidney disease    6. Dyslipidemia  Assessment & Plan:  Labs ok on meds       7. Gallstones  Assessment & Plan:  Gallstones and sludge on US  10/23; continues asymptomatic      8. Gallbladder polyp  Assessment & Plan:  Radiology reports no need for  follow up              Subjective      HPI  Pt is here for interval visit and evaluation of multiple medical problems, review of medications, labs, Health Maintenance and any recent specialty consults neuro endo urology sleep         Review of Systems   Constitutional:  Negative for appetite change, chills, fatigue and fever.   Respiratory:  Negative for cough, chest tightness and shortness of breath.    Cardiovascular:  Negative for chest pain, palpitations and leg swelling.   Gastrointestinal:  Negative for abdominal pain, constipation, diarrhea, nausea and vomiting.   Genitourinary:  Negative for difficulty urinating and frequency.   Musculoskeletal:  Negative for arthralgias, back pain, gait problem and neck pain.   Skin:  Negative for rash.   Neurological:  Negative for dizziness, weakness, light-headedness, numbness and headaches.   Hematological:  Does  not bruise/bleed easily.   Psychiatric/Behavioral:  Negative for dysphoric mood and sleep disturbance. The patient is not nervous/anxious.        Current Outpatient Medications on File Prior to Visit   Medication Sig   • amLODIPine (NORVASC) 10 mg tablet TAKE 1 TABLET BY MOUTH EVERY DAY   • Ascorbic Acid (VITAMIN C) 500 MG CAPS Take 1 tablet by mouth daily   • aspirin 81 MG tablet Take 81 mg by mouth daily.   • atorvastatin (LIPITOR) 40 mg tablet TAKE 1 TABLET BY MOUTH DAILY AT BEDTIME   • B Complex Vitamins (VITAMIN B COMPLEX PO) Take by mouth   • B-D UF III MINI PEN NEEDLES 31G X 5 MM MISC Use as prescribed   • Basaglar KwikPen 100 units/mL SOPN INJECT 0.55 ML (55 UNITS TOTAL) UNDER THE SKIN DAILY   • Cholecalciferol 50 MCG (2000 UT) CAPS Take 2 tablets by mouth 2 (two) times a day Once daily   • Cinnamon 500 MG TABS Take by mouth 2 (two) times a day    • coenzyme Q-10 100 MG capsule Take 2 capsules by mouth daily   • Cyanocobalamin (Vitamin B-12) 2500 MCG SUBL Place under the tongue   • Ferrous Sulfate (Iron) 325 (65 Fe) MG TABS Take by mouth 2 (two) times a day   • GARLIC PO Take by mouth   • glipiZIDE (GLUCOTROL XL) 5 mg 24 hr tablet Take 1 tablet (5 mg total) by mouth daily   • Icosapent Ethyl 1 g CAPS Take 2 capsules (2 g total) by mouth 2 (two) times a day before lunch and dinner   • Januvia 100 MG tablet Take 1 tablet (100 mg total) by mouth daily   • Jardiance 10 MG TABS tablet TAKE 1 TABLET BY MOUTH EVERY DAY   • loratadine (CLARITIN) 10 mg tablet Take 10 mg by mouth daily   • metFORMIN (GLUCOPHAGE-XR) 500 mg 24 hr tablet TAKE 2 TABLETS WITH BREAKFAST AND 2 TABLETS WITH DINNER   • metoprolol succinate (TOPROL-XL) 100 mg 24 hr tablet TAKE 1 TABLET BY MOUTH EVERY DAY   • Multiple Vitamin (MULTI-VITAMIN DAILY) TABS Take 1 tablet by mouth daily   • olmesartan (BENICAR) 40 mg tablet TAKE 1 TABLET BY MOUTH EVERY DAY   • pyridostigmine (MESTINON) 60 mg tablet Take 60 mg by mouth 3 (three) times a day.   •  "triamterene-hydrochlorothiazide (MAXZIDE-25) 37.5-25 mg per tablet TAKE 1 TABLET BY MOUTH EVERY DAY   • TURMERIC PO Take by mouth   • zinc gluconate 50 mg tablet Take 50 mg by mouth daily   • [employer] Covid-19 At Home Test Kit KIT  (Patient not taking: Reported on 1/11/2024)   • [DISCONTINUED] linaGLIPtin 5 MG TABS Take 5 mg by mouth daily (Patient not taking: Reported on 1/11/2024)       Objective     /62   Pulse 74   Temp 97.8 °F (36.6 °C) (Tympanic)   Resp 16   Ht 5' 6\" (1.676 m)   Wt 87.5 kg (193 lb)   SpO2 99%   BMI 31.15 kg/m²     Physical Exam  Vitals reviewed.   Constitutional:       General: He is not in acute distress.     Appearance: Normal appearance. He is well-developed. He is not ill-appearing.   HENT:      Mouth/Throat:      Mouth: Mucous membranes are moist.   Eyes:      Extraocular Movements: Extraocular movements intact.      Conjunctiva/sclera: Conjunctivae normal.      Pupils: Pupils are equal, round, and reactive to light.   Neck:      Thyroid: No thyromegaly.      Vascular: No carotid bruit.   Cardiovascular:      Rate and Rhythm: Normal rate and regular rhythm.      Pulses: Normal pulses. no weak pulses          Dorsalis pedis pulses are 2+ on the right side and 2+ on the left side.      Heart sounds: Normal heart sounds. No murmur heard.  Pulmonary:      Effort: Pulmonary effort is normal.      Breath sounds: Normal breath sounds.   Chest:      Chest wall: No tenderness.   Abdominal:      General: Bowel sounds are normal. There is no distension.      Palpations: Abdomen is soft.      Tenderness: There is no abdominal tenderness.   Musculoskeletal:      Cervical back: Normal range of motion and neck supple.   Feet:      Right foot:      Skin integrity: Dry skin present. No ulcer, skin breakdown, erythema, warmth or callus.      Left foot:      Skin integrity: Dry skin present. No ulcer, skin breakdown, erythema, warmth or callus.   Lymphadenopathy:      Cervical: No cervical " adenopathy.   Skin:     General: Skin is warm and dry.   Neurological:      General: No focal deficit present.      Mental Status: He is alert and oriented to person, place, and time. Mental status is at baseline.      Cranial Nerves: No cranial nerve deficit.   Psychiatric:         Mood and Affect: Mood normal.         Behavior: Behavior normal.     Sejal Adrian MD  Patient's shoes and socks removed.    Right Foot/Ankle   Right Foot Inspection  Skin Exam: skin normal, skin intact and dry skin. No warmth, no callus, no erythema, no maceration, no abnormal color, no pre-ulcer, no ulcer and no callus.     Toe Exam: No swelling, no tenderness, erythema and  no right toe deformity    Sensory   Vibration: intact  Proprioception: intact  Monofilament testing: intact    Vascular  The right DP pulse is 2+.     Right Toe  - Comprehensive Exam  Ecchymosis: none  Swelling: none   Tenderness: none       Left Foot/Ankle  Left Foot Inspection  Skin Exam: skin normal, skin intact and dry skin. No warmth, no erythema, no maceration, normal color, no pre-ulcer, no ulcer and no callus.     Sensory   Vibration: intact  Proprioception: intact  Monofilament testing: intact    Vascular  The left DP pulse is 2+.     Left Toe  - Comprehensive Exam  Ecchymosis: none  Swelling: none   Tenderness: none       Assign Risk Category  No deformity present  No loss of protective sensation  No weak pulses  Risk: 0

## 2024-01-11 NOTE — ASSESSMENT & PLAN NOTE
Lab Results   Component Value Date    HGBA1C 6.8 (H) 12/08/2023   Endocrinology note reviewed  well controlled

## 2024-01-31 DIAGNOSIS — E11.9 DIABETES MELLITUS WITHOUT COMPLICATION (HCC): ICD-10-CM

## 2024-01-31 RX ORDER — ICOSAPENT ETHYL 1000 MG/1
CAPSULE ORAL
Qty: 360 CAPSULE | Refills: 2 | Status: SHIPPED | OUTPATIENT
Start: 2024-01-31

## 2024-02-01 DIAGNOSIS — I10 ESSENTIAL HYPERTENSION: ICD-10-CM

## 2024-02-02 RX ORDER — TRIAMTERENE AND HYDROCHLOROTHIAZIDE 37.5; 25 MG/1; MG/1
1 TABLET ORAL DAILY
Qty: 90 TABLET | Refills: 0 | Status: SHIPPED | OUTPATIENT
Start: 2024-02-02

## 2024-02-20 ENCOUNTER — OFFICE VISIT (OUTPATIENT)
Dept: ENDOCRINOLOGY | Facility: CLINIC | Age: 64
End: 2024-02-20
Payer: COMMERCIAL

## 2024-02-20 VITALS
SYSTOLIC BLOOD PRESSURE: 132 MMHG | HEIGHT: 66 IN | HEART RATE: 78 BPM | DIASTOLIC BLOOD PRESSURE: 78 MMHG | BODY MASS INDEX: 30.37 KG/M2 | OXYGEN SATURATION: 100 % | TEMPERATURE: 97.3 F | RESPIRATION RATE: 14 BRPM | WEIGHT: 189 LBS

## 2024-02-20 DIAGNOSIS — Z85.040 HISTORY OF MALIGNANT CARCINOID TUMOR OF RECTUM: ICD-10-CM

## 2024-02-20 DIAGNOSIS — E11.65 INADEQUATELY CONTROLLED DIABETES MELLITUS (HCC): ICD-10-CM

## 2024-02-20 DIAGNOSIS — E11.9 DIABETES MELLITUS WITHOUT COMPLICATION (HCC): ICD-10-CM

## 2024-02-20 DIAGNOSIS — E03.9 HYPOTHYROIDISM, UNSPECIFIED TYPE: ICD-10-CM

## 2024-02-20 DIAGNOSIS — E66.09 CLASS 1 OBESITY DUE TO EXCESS CALORIES WITH SERIOUS COMORBIDITY AND BODY MASS INDEX (BMI) OF 30.0 TO 30.9 IN ADULT: ICD-10-CM

## 2024-02-20 DIAGNOSIS — E11.9 TYPE 2 DIABETES MELLITUS WITHOUT COMPLICATION, WITHOUT LONG-TERM CURRENT USE OF INSULIN (HCC): Primary | ICD-10-CM

## 2024-02-20 DIAGNOSIS — E78.5 DYSLIPIDEMIA: ICD-10-CM

## 2024-02-20 PROCEDURE — 99215 OFFICE O/P EST HI 40 MIN: CPT | Performed by: INTERNAL MEDICINE

## 2024-02-20 RX ORDER — INSULIN DEGLUDEC 200 U/ML
55 INJECTION, SOLUTION SUBCUTANEOUS EVERY MORNING
Qty: 24 ML | Refills: 1 | Status: SHIPPED | OUTPATIENT
Start: 2024-02-20

## 2024-02-20 RX ORDER — METFORMIN HYDROCHLORIDE 500 MG/1
TABLET, EXTENDED RELEASE ORAL
Qty: 360 TABLET | Refills: 1 | Status: SHIPPED | OUTPATIENT
Start: 2024-02-20

## 2024-02-20 RX ORDER — EMPAGLIFLOZIN 10 MG/1
10 TABLET, FILM COATED ORAL DAILY
Qty: 90 TABLET | Refills: 1 | Status: SHIPPED | OUTPATIENT
Start: 2024-02-20

## 2024-02-20 RX ORDER — BLOOD-GLUCOSE SENSOR
1 EACH MISCELLANEOUS
Qty: 2 EACH | Refills: 5 | Status: SHIPPED | OUTPATIENT
Start: 2024-02-20

## 2024-02-20 RX ORDER — SITAGLIPTIN 100 MG/1
100 TABLET, FILM COATED ORAL DAILY
Qty: 90 TABLET | Refills: 1 | Status: SHIPPED | OUTPATIENT
Start: 2024-02-20

## 2024-02-20 NOTE — PROGRESS NOTES
"  Follow-up Patient Progress Note      CC: Type 2 diabetes    History of Present Illness:   63-year-old male with type 2 diabetes, HTN, HLD, myasthenia gravis 2014 diagnosed clinically and on mestinon by Dr. Dalton/neurology, Hx of rectal carcinoid tumor s/p resection 2011, Hx pancreatitis, JERO, obesity BMI 31, CKD 2 eGFR 60 and vit d deficiency.    SAGM:    Current meds:  Basaglar 55 units nightly  Glipizide 2.5 mg daily  Januvia 100 mg daily  Jardiance 10 mg daily  Metformin 1000 mg p.o. twice daily    Opthamology: Yes  Podiatry: No  vaccination:   Dental:  Pancreatitis: no    Ace/ARB: Olmesartan 40 Mg daily  Statin:Lipitor 40  Thyroid issues: No      Physical Exam:  Body mass index is 30.51 kg/m².  /78 (BP Location: Left arm, Patient Position: Sitting)   Pulse 78   Temp (!) 97.3 °F (36.3 °C) (Tympanic)   Resp 14   Ht 5' 6\" (1.676 m)   Wt 85.7 kg (189 lb)   SpO2 100%   BMI 30.51 kg/m²    Vitals:    02/20/24 1011   Weight: 85.7 kg (189 lb)        Physical Exam  Constitutional:       General: He is not in acute distress.     Appearance: He is well-developed. He is not ill-appearing.   HENT:      Head: Normocephalic and atraumatic.      Nose: Nose normal.      Mouth/Throat:      Pharynx: Oropharynx is clear.   Eyes:      Extraocular Movements: Extraocular movements intact.      Conjunctiva/sclera: Conjunctivae normal.   Neck:      Thyroid: No thyromegaly.   Cardiovascular:      Rate and Rhythm: Normal rate.   Pulmonary:      Effort: Pulmonary effort is normal.   Musculoskeletal:         General: No deformity.      Cervical back: Normal range of motion.   Skin:     Capillary Refill: Capillary refill takes less than 2 seconds.      Coloration: Skin is not pale.      Findings: No rash.   Neurological:      Mental Status: He is alert and oriented to person, place, and time.   Psychiatric:         Behavior: Behavior normal.         Labs:   Lab Results   Component Value Date    HGBA1C 6.8 (H) 12/08/2023 "       Lab Results   Component Value Date    [id number] 1.131 08/10/2023       Lab Results   Component Value Date    CREATININE 1.27 08/10/2023    CREATININE 1.11 01/04/2023    CREATININE 1.15 09/28/2022    BUN 14 08/10/2023    K 3.9 08/10/2023     (H) 08/10/2023    CO2 32 08/10/2023     eGFR   Date Value Ref Range Status   08/10/2023 59 ml/min/1.73sq m Final       Lab Results   Component Value Date    ALT 21 10/06/2023    AST 24 10/06/2023    ALKPHOS 53 10/06/2023       Lab Results   Component Value Date    CHOLESTEROL 122 08/10/2023    CHOLESTEROL 99 01/04/2023    CHOLESTEROL 125 02/15/2022     Lab Results   Component Value Date    HDL 49 08/10/2023    HDL 44 01/04/2023    HDL 44 02/15/2022     Lab Results   Component Value Date    TRIG 148 08/10/2023    TRIG 128 01/04/2023    TRIG 130 02/15/2022     Lab Results   Component Value Date    NONHDLC 73 08/10/2023    NONHDLC 55 01/04/2023    NONHDLC 81 02/15/2022         Assessment/Plan:    Problem List Items Addressed This Visit          Endocrine    Type 2 diabetes mellitus without complication, without long-term current use of insulin (HCC) - Primary     He is generally controlled but I am concerned about hypoglycemia.  Today we discussed all aspects of diabetes including pathophysiology, risk factors, complications, SAGM, CGM, diet, lifestyle modifications, medical fitness training, diabetes education, goals of therapy, follow up needs and medications including insulin, metformin, Jardiance and GLP1 agonists.  Advised to maintain goal blood sugars 70-180mg/dL.    We agreed to stop glipizide and continue other medications. Refills sent. Basaglar switched to Tresiba based on insurance.    Follow up in 6-12 months      Lab Results   Component Value Date    HGBA1C 6.8 (H) 12/08/2023            Relevant Medications    Continuous Blood Gluc Sensor (FreeStyle Dagoberto 3 Sensor) MISC    insulin degludec (Tresiba FlexTouch) 200 units/mL CONCENTRATED U-200 injection pen     Jardiance 10 MG TABS tablet    Januvia 100 MG tablet    metFORMIN (GLUCOPHAGE-XR) 500 mg 24 hr tablet    Other Relevant Orders    Hemoglobin A1C    Albumin / creatinine urine ratio       Other    Dyslipidemia     Continue lipitor         History of malignant carcinoid tumor of rectum    Class 1 obesity due to excess calories with serious comorbidity and body mass index (BMI) of 30.0 to 30.9 in adult     Today we discussed all aspects of obesity and metabolism including pathophysiology, risk factors, complications, goal of sustaining weight loss long term, usual propensity to regain weight with short term approaches, follow up needs and medications - efficacy and limitations.   Discussed role of endocrinopathies, inflammatory conditions, sleep disorders, mental health disorders, lifestyle issues and polypharmacy and weight gain.  Briefly discussed bariatric surgery.  Diet: carb controlled diet <1500cal/day/ VLCD, 64oz fluids/day   lifestyle modifications: intermittent fasting and 10,000 steps/day  medical fitness training: muscle building  education: nutrition input           Relevant Orders    Ambulatory Referral to Medical Fitness Exercise Specialist     Other Visit Diagnoses       Hypothyroidism, unspecified type        Relevant Orders    T4, free    TSH, 3rd generation    Inadequately controlled diabetes mellitus (HCC)        Relevant Medications    insulin degludec (Tresiba FlexTouch) 200 units/mL CONCENTRATED U-200 injection pen    Jardiance 10 MG TABS tablet    Januvia 100 MG tablet    metFORMIN (GLUCOPHAGE-XR) 500 mg 24 hr tablet    Diabetes mellitus without complication (HCC)        Relevant Medications    insulin degludec (Tresiba FlexTouch) 200 units/mL CONCENTRATED U-200 injection pen    Jardiance 10 MG TABS tablet    Januvia 100 MG tablet    metFORMIN (GLUCOPHAGE-XR) 500 mg 24 hr tablet              I have spent a total time of 45 minutes on 02/20/24 in caring for this patient including greater than  50% of this time was spent in counseling/coordination of care as listed above.       Discussed with the patient and all questioned fully answered. He will contact me with concerns.    Alden Fernandez

## 2024-02-20 NOTE — ASSESSMENT & PLAN NOTE
He is generally controlled but I am concerned about hypoglycemia.  Today we discussed all aspects of diabetes including pathophysiology, risk factors, complications, SAGM, CGM, diet, lifestyle modifications, medical fitness training, diabetes education, goals of therapy, follow up needs and medications including insulin, metformin, Jardiance and GLP1 agonists.  Advised to maintain goal blood sugars 70-180mg/dL.    We agreed to stop glipizide and continue other medications. Refills sent. Basaglar switched to Tresiba based on insurance.    Follow up in 6-12 months      Lab Results   Component Value Date    HGBA1C 6.8 (H) 12/08/2023

## 2024-03-06 ENCOUNTER — FITNESS (OUTPATIENT)
Age: 64
End: 2024-03-06

## 2024-03-06 DIAGNOSIS — I10 ESSENTIAL HYPERTENSION: ICD-10-CM

## 2024-03-06 DIAGNOSIS — E66.09 CLASS 1 OBESITY DUE TO EXCESS CALORIES WITH SERIOUS COMORBIDITY AND BODY MASS INDEX (BMI) OF 30.0 TO 30.9 IN ADULT: ICD-10-CM

## 2024-03-06 NOTE — PROGRESS NOTES
Fitness Plan of Care    Referring Provider: Alden Fernandez MD  Diagnosis: Diabetes    Risk Factors  Cholesterol: no  Smoking: no  HTN: yes  DM: yes  Obesity: yes  Inactivity: no  Stress: no    Lifestyle Assessment Score  LMA Initial Score: 48    Exercise Prescription  Cardiovascular  150 min/week of cardiovascular exercise. Modalities include treadmill, elliptical, upright bike. Target HR should be 125-133.     Strength Training  2-3x/week of strength training. Exercises targeting both Upper and lower body. 3 sets of 10-12 repetitions.

## 2024-03-07 RX ORDER — AMLODIPINE BESYLATE 10 MG/1
TABLET ORAL
Qty: 90 TABLET | Refills: 1 | Status: SHIPPED | OUTPATIENT
Start: 2024-03-07

## 2024-03-07 RX ORDER — TRIAMTERENE AND HYDROCHLOROTHIAZIDE 37.5; 25 MG/1; MG/1
1 TABLET ORAL DAILY
Qty: 90 TABLET | Refills: 0 | Status: SHIPPED | OUTPATIENT
Start: 2024-03-07

## 2024-04-03 DIAGNOSIS — I10 ESSENTIAL (PRIMARY) HYPERTENSION: ICD-10-CM

## 2024-04-03 RX ORDER — OLMESARTAN MEDOXOMIL 40 MG/1
TABLET ORAL
Qty: 90 TABLET | Refills: 1 | Status: SHIPPED | OUTPATIENT
Start: 2024-04-03

## 2024-04-19 ENCOUNTER — APPOINTMENT (OUTPATIENT)
Dept: LAB | Facility: AMBULARY SURGERY CENTER | Age: 64
End: 2024-04-19
Payer: COMMERCIAL

## 2024-04-19 DIAGNOSIS — E03.9 HYPOTHYROIDISM, UNSPECIFIED TYPE: ICD-10-CM

## 2024-04-19 DIAGNOSIS — E11.9 TYPE 2 DIABETES MELLITUS WITHOUT COMPLICATION, WITHOUT LONG-TERM CURRENT USE OF INSULIN (HCC): ICD-10-CM

## 2024-04-19 LAB
CREAT UR-MCNC: 141.9 MG/DL
EST. AVERAGE GLUCOSE BLD GHB EST-MCNC: 151 MG/DL
HBA1C MFR BLD: 6.9 %
MICROALBUMIN UR-MCNC: 56.7 MG/L
MICROALBUMIN/CREAT 24H UR: 40 MG/G CREATININE (ref 0–30)
T4 FREE SERPL-MCNC: 0.72 NG/DL (ref 0.61–1.12)
TSH SERPL DL<=0.05 MIU/L-ACNC: 0.97 UIU/ML (ref 0.45–4.5)

## 2024-04-19 PROCEDURE — 83036 HEMOGLOBIN GLYCOSYLATED A1C: CPT

## 2024-04-19 PROCEDURE — 36415 COLL VENOUS BLD VENIPUNCTURE: CPT

## 2024-04-19 PROCEDURE — 84443 ASSAY THYROID STIM HORMONE: CPT

## 2024-04-19 PROCEDURE — 84439 ASSAY OF FREE THYROXINE: CPT

## 2024-04-19 PROCEDURE — 82570 ASSAY OF URINE CREATININE: CPT

## 2024-04-19 PROCEDURE — 82043 UR ALBUMIN QUANTITATIVE: CPT

## 2024-04-29 DIAGNOSIS — E11.65 TYPE 2 DIABETES MELLITUS WITH HYPERGLYCEMIA, WITHOUT LONG-TERM CURRENT USE OF INSULIN (HCC): ICD-10-CM

## 2024-04-30 RX ORDER — GLIPIZIDE 5 MG/1
5 TABLET, FILM COATED, EXTENDED RELEASE ORAL DAILY
Qty: 90 TABLET | Refills: 1 | Status: SHIPPED | OUTPATIENT
Start: 2024-04-30 | End: 2024-10-27

## 2024-05-20 ENCOUNTER — OFFICE VISIT (OUTPATIENT)
Dept: ENDOCRINOLOGY | Facility: CLINIC | Age: 64
End: 2024-05-20
Payer: COMMERCIAL

## 2024-05-20 VITALS
SYSTOLIC BLOOD PRESSURE: 140 MMHG | DIASTOLIC BLOOD PRESSURE: 78 MMHG | RESPIRATION RATE: 16 BRPM | HEIGHT: 66 IN | TEMPERATURE: 97.3 F | OXYGEN SATURATION: 97 % | HEART RATE: 81 BPM | WEIGHT: 188 LBS | BODY MASS INDEX: 30.22 KG/M2

## 2024-05-20 DIAGNOSIS — E55.9 VITAMIN D DEFICIENCY: ICD-10-CM

## 2024-05-20 DIAGNOSIS — E66.09 CLASS 1 OBESITY DUE TO EXCESS CALORIES WITH SERIOUS COMORBIDITY AND BODY MASS INDEX (BMI) OF 30.0 TO 30.9 IN ADULT: Primary | ICD-10-CM

## 2024-05-20 DIAGNOSIS — E78.5 DYSLIPIDEMIA: ICD-10-CM

## 2024-05-20 DIAGNOSIS — E11.9 TYPE 2 DIABETES MELLITUS WITHOUT COMPLICATION, WITHOUT LONG-TERM CURRENT USE OF INSULIN (HCC): ICD-10-CM

## 2024-05-20 PROCEDURE — 99214 OFFICE O/P EST MOD 30 MIN: CPT | Performed by: INTERNAL MEDICINE

## 2024-05-20 PROCEDURE — 95251 CONT GLUC MNTR ANALYSIS I&R: CPT | Performed by: INTERNAL MEDICINE

## 2024-05-20 RX ORDER — INSULIN DEGLUDEC 200 U/ML
50 INJECTION, SOLUTION SUBCUTANEOUS EVERY MORNING
Qty: 24 ML | Refills: 1 | Status: SHIPPED | OUTPATIENT
Start: 2024-05-20

## 2024-05-20 NOTE — PROGRESS NOTES
"    Follow-up Patient Progress Note      CC: Type 2 diabetes     History of Present Illness:   63-year-old male with type 2 diabetes, HTN, HLD, myasthenia gravis 2014 diagnosed clinically and on mestinon by Dr. Dalton/neurology, Hx of rectal carcinoid tumor s/p resection 2011, Hx pancreatitis, cholelithiasis, JERO, obesity BMI 31, CKD 2 eGFR 60 and vit d deficiency. Last visit was 2/20/24.    Since last visit, weight is same.      CGM data review::  Device: libre3 Dates: 5/7/24-5/20/24  Usage: 96 % Av glu: 137 mg/dL  SD:  mg/dL CV:   % GMI:6.7  %  TIR: 83 % TAR: 15+2 %  TBR: 0 %    Glycemic patters:  excellent control but occasional postprandial hyperglycemia and a tendency for fasting lows.  Hypoglycemia: No    Current meds:  Tresiba 54 units nightly  Januvia 100 mg daily  Jardiance 10 mg daily  Metformin 1000 mg p.o. twice daily     Opthamology: Yes  Podiatry: No  vaccination:   Dental:  Pancreatitis: no     Ace/ARB: Olmesartan 40 Mg daily  Statin:Lipitor 40  Thyroid issues: No      Physical Exam:  Body mass index is 30.34 kg/m².  /78 (BP Location: Left arm, Patient Position: Sitting)   Pulse 81   Temp (!) 97.3 °F (36.3 °C) (Tympanic)   Resp 16   Ht 5' 6\" (1.676 m)   Wt 85.3 kg (188 lb)   SpO2 97%   BMI 30.34 kg/m²    Vitals:    05/20/24 0955   Weight: 85.3 kg (188 lb)        Physical Exam  Constitutional:       General: He is not in acute distress.     Appearance: He is well-developed.   HENT:      Head: Normocephalic and atraumatic.      Nose: Nose normal.   Eyes:      Conjunctiva/sclera: Conjunctivae normal.   Pulmonary:      Effort: Pulmonary effort is normal.   Abdominal:      General: There is no distension.   Musculoskeletal:      Cervical back: Normal range of motion and neck supple.   Skin:     Findings: No rash.      Comments: No icterus   Neurological:      Mental Status: He is alert and oriented to person, place, and time.         Labs:   Lab Results   Component Value Date    HGBA1C 6.9 (H) " 04/19/2024       Lab Results   Component Value Date    [id number] 0.974 04/19/2024       Lab Results   Component Value Date    CREATININE 1.27 08/10/2023    CREATININE 1.11 01/04/2023    CREATININE 1.15 09/28/2022    BUN 14 08/10/2023    K 3.9 08/10/2023     (H) 08/10/2023    CO2 32 08/10/2023     eGFR   Date Value Ref Range Status   08/10/2023 59 ml/min/1.73sq m Final       Lab Results   Component Value Date    ALT 21 10/06/2023    AST 24 10/06/2023    ALKPHOS 53 10/06/2023       Lab Results   Component Value Date    CHOLESTEROL 122 08/10/2023    CHOLESTEROL 99 01/04/2023    CHOLESTEROL 125 02/15/2022     Lab Results   Component Value Date    HDL 49 08/10/2023    HDL 44 01/04/2023    HDL 44 02/15/2022     Lab Results   Component Value Date    TRIG 148 08/10/2023    TRIG 128 01/04/2023    TRIG 130 02/15/2022     Lab Results   Component Value Date    NONHDLC 73 08/10/2023    NONHDLC 55 01/04/2023    NONHDLC 81 02/15/2022         Assessment/Plan:    1. Class 1 obesity due to excess calories with serious comorbidity and body mass index (BMI) of 30.0 to 30.9 in adult  Assessment & Plan:  Advised diet and lifestyle changes  2. Type 2 diabetes mellitus without complication, without long-term current use of insulin (HCC)  Assessment & Plan:  He is in good control. No hypoglycemia on AGP.    Today we agreed to reduce Tresiba 50 units nightly and continue others Januvia 100 mg daily, Jardiance 10 mg daily, Metformin 1000 mg p.o. twice daily.  Advised to use cgm for monitoring hypoglycemia and modifying behaviors.    Follow up in 3 months.    Lab Results   Component Value Date    HGBA1C 6.9 (H) 04/19/2024     Orders:  -     insulin degludec (Tresiba FlexTouch) 200 units/mL CONCENTRATED U-200 injection pen; Inject 50 Units under the skin every morning  3. Dyslipidemia  Assessment & Plan:  Continue lipitor  4. Vitamin D deficiency  Assessment & Plan:  Take voit D3 2000IU daily OTC        I have spent a total time of 30  minutes on 05/20/24 in caring for this patient including greater than 50% of this time was spent in counseling/coordination of care as listed above.       Discussed with the patient and all questioned fully answered. He will contact me with concerns.    Alden Fernandez

## 2024-05-20 NOTE — ASSESSMENT & PLAN NOTE
He is in good control. No hypoglycemia on AGP.    Today we agreed to reduce Tresiba 50 units nightly and continue others Januvia 100 mg daily, Jardiance 10 mg daily, Metformin 1000 mg p.o. twice daily.  Advised to use cgm for monitoring hypoglycemia and modifying behaviors.    Follow up in 3 months.    Lab Results   Component Value Date    HGBA1C 6.9 (H) 04/19/2024

## 2024-05-21 DIAGNOSIS — I10 ESSENTIAL HYPERTENSION: ICD-10-CM

## 2024-05-21 DIAGNOSIS — E11.65 INADEQUATELY CONTROLLED DIABETES MELLITUS (HCC): ICD-10-CM

## 2024-05-21 RX ORDER — METOPROLOL SUCCINATE 100 MG/1
TABLET, EXTENDED RELEASE ORAL
Qty: 90 TABLET | Refills: 3 | Status: SHIPPED | OUTPATIENT
Start: 2024-05-21

## 2024-05-22 RX ORDER — METFORMIN HYDROCHLORIDE 500 MG/1
TABLET, EXTENDED RELEASE ORAL
Qty: 360 TABLET | Refills: 1 | Status: SHIPPED | OUTPATIENT
Start: 2024-05-22

## 2024-07-08 ENCOUNTER — OFFICE VISIT (OUTPATIENT)
Dept: FAMILY MEDICINE CLINIC | Facility: CLINIC | Age: 64
End: 2024-07-08
Payer: COMMERCIAL

## 2024-07-08 ENCOUNTER — VBI (OUTPATIENT)
Dept: ADMINISTRATIVE | Facility: OTHER | Age: 64
End: 2024-07-08

## 2024-07-08 VITALS
BODY MASS INDEX: 29.73 KG/M2 | DIASTOLIC BLOOD PRESSURE: 78 MMHG | RESPIRATION RATE: 16 BRPM | OXYGEN SATURATION: 99 % | HEART RATE: 76 BPM | TEMPERATURE: 98 F | WEIGHT: 185 LBS | SYSTOLIC BLOOD PRESSURE: 128 MMHG | HEIGHT: 66 IN

## 2024-07-08 DIAGNOSIS — I10 ESSENTIAL HYPERTENSION: Primary | ICD-10-CM

## 2024-07-08 DIAGNOSIS — N13.8 BPH WITH OBSTRUCTION/LOWER URINARY TRACT SYMPTOMS: ICD-10-CM

## 2024-07-08 DIAGNOSIS — E11.9 TYPE 2 DIABETES MELLITUS WITHOUT COMPLICATION, WITHOUT LONG-TERM CURRENT USE OF INSULIN (HCC): ICD-10-CM

## 2024-07-08 DIAGNOSIS — Z00.00 ANNUAL PHYSICAL EXAM: ICD-10-CM

## 2024-07-08 DIAGNOSIS — E78.5 DYSLIPIDEMIA: ICD-10-CM

## 2024-07-08 DIAGNOSIS — E55.9 VITAMIN D DEFICIENCY: ICD-10-CM

## 2024-07-08 DIAGNOSIS — G70.00 MYASTHENIA GRAVIS (HCC): ICD-10-CM

## 2024-07-08 DIAGNOSIS — N18.2 STAGE 2 CHRONIC KIDNEY DISEASE: ICD-10-CM

## 2024-07-08 DIAGNOSIS — R19.8 CHANGE IN BOWEL MOVEMENT: ICD-10-CM

## 2024-07-08 DIAGNOSIS — M62.08 DIASTASIS OF RECTUS ABDOMINIS: ICD-10-CM

## 2024-07-08 DIAGNOSIS — N40.1 BPH WITH OBSTRUCTION/LOWER URINARY TRACT SYMPTOMS: ICD-10-CM

## 2024-07-08 DIAGNOSIS — M51.26 LUMBAR DISC HERNIATION: ICD-10-CM

## 2024-07-08 DIAGNOSIS — G47.33 OSA (OBSTRUCTIVE SLEEP APNEA): ICD-10-CM

## 2024-07-08 PROBLEM — K42.9 UMBILICAL HERNIA WITHOUT OBSTRUCTION AND WITHOUT GANGRENE: Status: RESOLVED | Noted: 2021-06-22 | Resolved: 2024-07-08

## 2024-07-08 PROBLEM — E66.811 CLASS 1 OBESITY DUE TO EXCESS CALORIES WITH SERIOUS COMORBIDITY AND BODY MASS INDEX (BMI) OF 30.0 TO 30.9 IN ADULT: Status: RESOLVED | Noted: 2021-06-17 | Resolved: 2024-07-08

## 2024-07-08 PROBLEM — E66.09 CLASS 1 OBESITY DUE TO EXCESS CALORIES WITH SERIOUS COMORBIDITY AND BODY MASS INDEX (BMI) OF 30.0 TO 30.9 IN ADULT: Status: RESOLVED | Noted: 2021-06-17 | Resolved: 2024-07-08

## 2024-07-08 LAB
LEFT EYE DIABETIC RETINOPATHY: POSITIVE
RIGHT EYE DIABETIC RETINOPATHY: POSITIVE

## 2024-07-08 PROCEDURE — 99214 OFFICE O/P EST MOD 30 MIN: CPT | Performed by: FAMILY MEDICINE

## 2024-07-08 PROCEDURE — 99396 PREV VISIT EST AGE 40-64: CPT | Performed by: FAMILY MEDICINE

## 2024-07-08 NOTE — ASSESSMENT & PLAN NOTE
Lab Results   Component Value Date    EGFR 59 08/10/2023    EGFR 70 01/04/2023    EGFR 67 09/28/2022    CREATININE 1.27 08/10/2023    CREATININE 1.11 01/04/2023    CREATININE 1.15 09/28/2022   Check labs

## 2024-07-08 NOTE — PROGRESS NOTES
Subjective:  Patient here for annual physical Pt is here for interval visit and evaluation of multiple medical problems, review of medications, labs, Health Maintenance and any recent specialty consults         Patient ID: Garry Koch is a 64 y.o. male.    HPI    Past Medical History:   Diagnosis Date   • Carcinoid tumor of colon    • Carcinoid tumor of rectum    • Colon polyp    • CPAP (continuous positive airway pressure) dependence    • Diabetes mellitus (HCC)    • Diabetic eye exam (HCC) 2020   • Hyperlipidemia    • Hypernatremia    • Hypertension    • Iron deficiency    • Liver hemangioma    • Myasthenia gravis (HCC)    • Nephropathy    • Retinopathy    • Scoliosis 2000   • Sleep apnea    • Urinary tract infection 2003       Family History   Problem Relation Age of Onset   • Diabetes Mother    • Cerebral aneurysm Mother    • Heart disease Father    • Heart failure Father    • Prostate cancer Father    • Alcohol abuse Father    • No Known Problems Sister        Past Surgical History:   Procedure Laterality Date   • BACK SURGERY  01/01/2001    lump removed    • COLONOSCOPY      1/1/12,1/1/2016 tumor removed    • CYST REMOVAL  02/07/2024    from back of neck   • DENTAL SURGERY     • FL RETROGRADE PYELOGRAM  06/25/2020   • HERNIA REPAIR  7/8/2021   • NASAL POLYP EXCISION     • WV COLONOSCOPY FLX DX W/COLLJ SPEC WHEN PFRMD N/A 07/19/2016    Procedure: COLONOSCOPY;  Surgeon: Williams Gonzales MD;  [facility];  Service: Gastroenterology   • WV CYSTO BLADDER W/URETERAL CATHETERIZATION Bilateral 06/25/2020    Procedure: CYSTOSCOPY WITH RETROGRADE PYELOGRAM;  Surgeon: Amor Lloyd MD;  [facility];  Service: Urology   • WV CYSTO W/REMOVAL OF LESIONS SMALL N/A 06/25/2020    Procedure: TRANSURETHRAL RESECTION OF BLADDER TUMOR (TURBT);  Surgeon: Amor Lloyd MD;  [facility];  Service: Urology   • WV RPR UMBILICAL HRNA 5 YRS/> REDUCIBLE N/A 07/08/2021    Procedure: REPAIR HERNIA  UMBILICAL WITH MESH;  Surgeon: Walt Coates MD;  Location:  MAIN OR;  Service: General   • TRANSURETHRAL RESECTION OF PROSTATE          reports that he has never smoked. He has never used smokeless tobacco. He reports current alcohol use of about 3.0 standard drinks of alcohol per week. He reports that he does not use drugs.      Current Outpatient Medications:   •  amLODIPine (NORVASC) 10 mg tablet, TAKE 1 TABLET BY MOUTH EVERY DAY, Disp: 90 tablet, Rfl: 1  •  Ascorbic Acid (VITAMIN C) 500 MG CAPS, Take 1 tablet by mouth daily, Disp: , Rfl:   •  aspirin 81 MG tablet, Take 81 mg by mouth daily., Disp: , Rfl:   •  atorvastatin (LIPITOR) 40 mg tablet, TAKE 1 TABLET BY MOUTH DAILY AT BEDTIME, Disp: 90 tablet, Rfl: 2  •  B Complex Vitamins (VITAMIN B COMPLEX PO), Take by mouth, Disp: , Rfl:   •  B-D UF III MINI PEN NEEDLES 31G X 5 MM MISC, Use as prescribed, Disp: 360 each, Rfl: 3  •  Cholecalciferol 50 MCG (2000 UT) CAPS, Take 2 tablets by mouth 2 (two) times a day Once daily, Disp: , Rfl:   •  Cinnamon 500 MG TABS, Take by mouth 2 (two) times a day , Disp: , Rfl:   •  coenzyme Q-10 100 MG capsule, Take 2 capsules by mouth daily, Disp: , Rfl:   •  Continuous Blood Gluc Sensor (FreeStyle Dagoberto 3 Sensor) MISC, Use 1 Units every 14 (fourteen) days, Disp: 2 each, Rfl: 5  •  Cyanocobalamin (Vitamin B-12) 2500 MCG SUBL, Place under the tongue, Disp: , Rfl:   •  Ferrous Sulfate (Iron) 325 (65 Fe) MG TABS, Take by mouth 2 (two) times a day, Disp: , Rfl:   •  GARLIC PO, Take by mouth, Disp: , Rfl:   •  insulin degludec (Tresiba FlexTouch) 200 units/mL CONCENTRATED U-200 injection pen, Inject 50 Units under the skin every morning, Disp: 24 mL, Rfl: 1  •  Januvia 100 MG tablet, Take 1 tablet (100 mg total) by mouth daily, Disp: 90 tablet, Rfl: 1  •  Jardiance 10 MG TABS tablet, Take 1 tablet (10 mg total) by mouth daily, Disp: 90 tablet, Rfl: 1  •  loratadine (CLARITIN) 10 mg tablet, Take 10 mg by mouth daily, Disp: , Rfl:    •  metFORMIN (GLUCOPHAGE-XR) 500 mg 24 hr tablet, TAKE 2 TABLETS WITH BREAKFAST AND 2 TABLETS WITH DINNER, Disp: 360 tablet, Rfl: 1  •  metoprolol succinate (TOPROL-XL) 100 mg 24 hr tablet, TAKE 1 TABLET BY MOUTH EVERY DAY, Disp: 90 tablet, Rfl: 3  •  Multiple Vitamin (MULTI-VITAMIN DAILY) TABS, Take 1 tablet by mouth daily, Disp: , Rfl:   •  olmesartan (BENICAR) 40 mg tablet, TAKE 1 TABLET BY MOUTH EVERY DAY, Disp: 90 tablet, Rfl: 1  •  pyridostigmine (MESTINON) 60 mg tablet, Take 60 mg by mouth 3 (three) times a day., Disp: , Rfl:   •  triamterene-hydrochlorothiazide (MAXZIDE-25) 37.5-25 mg per tablet, TAKE 1 TABLET BY MOUTH EVERY DAY, Disp: 90 tablet, Rfl: 0  •  TURMERIC PO, Take by mouth, Disp: , Rfl:   •  Vascepa 1 g CAPS, TAKE 2 CAPSULES (2 G TOTAL) BY MOUTH 2 (TWO) TIMES A DAY BEFORE LUNCH AND DINNER, Disp: 360 capsule, Rfl: 2  •  zinc gluconate 50 mg tablet, Take 50 mg by mouth daily, Disp: , Rfl:     The following portions of the patient's history were reviewed and updated as appropriate: allergies, current medications, past family history, past medical history, past social history, past surgical history and problem list.    Review of Systems   Constitutional:  Negative for appetite change, chills, fatigue and fever.   Respiratory:  Negative for cough, chest tightness and shortness of breath.    Cardiovascular:  Negative for chest pain, palpitations and leg swelling.   Gastrointestinal:  Positive for diarrhea (intermittent loss of bowel control 4-5 episodes over past 2 yrs). Negative for abdominal pain, constipation, nausea and vomiting.   Genitourinary:  Negative for difficulty urinating and frequency.   Musculoskeletal:  Negative for arthralgias, back pain, gait problem and neck pain.   Skin:  Negative for rash.   Neurological:  Negative for dizziness, weakness, light-headedness, numbness and headaches.   Hematological:  Does not bruise/bleed easily.   Psychiatric/Behavioral:  Negative for dysphoric  "mood and sleep disturbance. The patient is not nervous/anxious.          PHQ-2/9 Depression Screening    Little interest or pleasure in doing things: 0 - not at all  Feeling down, depressed, or hopeless: 0 - not at all  PHQ-2 Score: 0  PHQ-2 Interpretation: Negative depression screen             Objective:    /78 (BP Location: Left arm, Patient Position: Sitting, Cuff Size: Standard)   Pulse 76   Temp 98 °F (36.7 °C) (Tympanic)   Resp 16   Ht 5' 6\" (1.676 m)   Wt 83.9 kg (185 lb)   SpO2 99%   BMI 29.86 kg/m²      Physical Exam  Vitals reviewed.   Constitutional:       General: He is not in acute distress.     Appearance: Normal appearance. He is not ill-appearing.   HENT:      Right Ear: Tympanic membrane, ear canal and external ear normal.      Left Ear: Tympanic membrane, ear canal and external ear normal.      Nose: Nose normal.   Eyes:      General: Lids are normal.      Extraocular Movements: Extraocular movements intact.      Conjunctiva/sclera: Conjunctivae normal.      Pupils: Pupils are equal, round, and reactive to light.   Neck:      Thyroid: No thyromegaly.      Vascular: No carotid bruit.   Cardiovascular:      Rate and Rhythm: Normal rate and regular rhythm.      Pulses: Normal pulses.      Heart sounds: Normal heart sounds.   Pulmonary:      Effort: Pulmonary effort is normal.      Breath sounds: Normal breath sounds.   Chest:   Breasts:     Right: Normal.      Left: Normal.   Abdominal:      General: Bowel sounds are normal. There is no distension.      Palpations: Abdomen is soft. There is no mass.      Tenderness: There is no abdominal tenderness.      Hernia: No hernia is present.      Comments: Diastasis rectus abdominus   Musculoskeletal:         General: Normal range of motion.      Cervical back: Full passive range of motion without pain, normal range of motion and neck supple.      Right lower leg: No edema.      Left lower leg: No edema.   Lymphadenopathy:      Cervical: No " cervical adenopathy.   Skin:     General: Skin is warm and dry.      Comments: No abn moles   Neurological:      General: No focal deficit present.      Mental Status: He is alert and oriented to person, place, and time. Mental status is at baseline.      Cranial Nerves: No cranial nerve deficit.      Sensory: No sensory deficit.      Motor: No weakness or tremor.      Coordination: Coordination normal.      Gait: Gait normal.      Deep Tendon Reflexes: Reflexes normal.   Psychiatric:         Mood and Affect: Mood normal.         Speech: Speech normal.         Behavior: Behavior normal.           Recent Results (from the past 8736 hour(s))   Urinalysis with microscopic    Collection Time: 08/10/23  8:11 AM   Result Value Ref Range    Color, UA Yellow     Clarity, UA Clear     Specific Gravity, UA >=1.030 1.005 - 1.030    pH, UA 5.5 4.5, 5.0, 5.5, 6.0, 6.5, 7.0, 7.5, 8.0    Leukocytes, UA Negative Negative    Nitrite, UA Negative Negative    Protein, UA Trace (A) Negative mg/dl    Glucose, UA 1000 (1%) (A) Negative mg/dl    Ketones, UA Negative Negative mg/dl    Urobilinogen, UA <2.0 <2.0 mg/dl mg/dl    Bilirubin, UA Negative Negative    Occult Blood, UA Negative Negative    RBC, UA None Seen None Seen, 1-2 /hpf    WBC, UA 1-2 None Seen, 1-2 /hpf    Epithelial Cells None Seen None Seen, Occasional /hpf    Bacteria, UA None Seen None Seen, Occasional /hpf   Hemoglobin A1C    Collection Time: 08/10/23  8:11 AM   Result Value Ref Range    Hemoglobin A1C 6.2 (H) Normal 3.8-5.6%; PreDiabetic 5.7-6.4%; Diabetic >=6.5%; Glycemic control for adults with diabetes <7.0% %     mg/dl   Comprehensive metabolic panel    Collection Time: 08/10/23  8:11 AM   Result Value Ref Range    Sodium 143 135 - 147 mmol/L    Potassium 3.9 3.5 - 5.3 mmol/L    Chloride 109 (H) 96 - 108 mmol/L    CO2 32 21 - 32 mmol/L    ANION GAP 2 mmol/L    BUN 14 5 - 25 mg/dL    Creatinine 1.27 0.60 - 1.30 mg/dL    Glucose, Fasting 102 (H) 65 - 99 mg/dL     Calcium 9.4 8.3 - 10.1 mg/dL    AST 14 5 - 45 U/L    ALT 23 12 - 78 U/L    Alkaline Phosphatase 56 46 - 116 U/L    Total Protein 7.7 6.4 - 8.4 g/dL    Albumin 4.2 3.5 - 5.0 g/dL    Total Bilirubin 1.06 (H) 0.20 - 1.00 mg/dL    eGFR 59 ml/min/1.73sq m   Albumin / creatinine urine ratio    Collection Time: 08/10/23  8:11 AM   Result Value Ref Range    Creatinine, Ur 163.0 mg/dL    Albumin,U,Random 38.9 (H) 0.0 - 20.0 mg/L    Albumin Creat Ratio 24 0 - 30 mg/g creatinine   TSH, 3rd generation    Collection Time: 08/10/23  8:11 AM   Result Value Ref Range    TSH 3RD GENERATON 1.131 0.450 - 4.500 uIU/mL   Lipid panel Lab Collect Lab Collect    Collection Time: 08/10/23  8:11 AM   Result Value Ref Range    Cholesterol 122 See Comment mg/dL    Triglycerides 148 See Comment mg/dL    HDL, Direct 49 >=40 mg/dL    LDL Calculated 43 0 - 100 mg/dL    Non-HDL-Chol (CHOL-HDL) 73 mg/dl   PSA, Total Screen    Collection Time: 08/10/23  8:11 AM   Result Value Ref Range    PSA 2.60 0.00 - 4.00 ng/mL   CBC and differential    Collection Time: 08/10/23  8:11 AM   Result Value Ref Range    WBC 5.34 4.31 - 10.16 Thousand/uL    RBC 5.30 3.88 - 5.62 Million/uL    Hemoglobin 14.7 12.0 - 17.0 g/dL    Hematocrit 46.0 36.5 - 49.3 %    MCV 87 82 - 98 fL    MCH 27.7 26.8 - 34.3 pg    MCHC 32.0 31.4 - 37.4 g/dL    RDW 13.1 11.6 - 15.1 %    MPV 10.6 8.9 - 12.7 fL    Platelets 263 149 - 390 Thousands/uL    nRBC 0 /100 WBCs    Segmented % 62 43 - 75 %    Immature Grans % 0 0 - 2 %    Lymphocytes % 25 14 - 44 %    Monocytes % 10 4 - 12 %    Eosinophils Relative 2 0 - 6 %    Basophils Relative 1 0 - 1 %    Absolute Neutrophils 3.31 1.85 - 7.62 Thousands/µL    Absolute Immature Grans 0.01 0.00 - 0.20 Thousand/uL    Absolute Lymphocytes 1.32 0.60 - 4.47 Thousands/µL    Absolute Monocytes 0.54 0.17 - 1.22 Thousand/µL    Eosinophils Absolute 0.11 0.00 - 0.61 Thousand/µL    Basophils Absolute 0.05 0.00 - 0.10 Thousands/µL   Cytology, urine    Collection  Time: 09/13/23  8:27 AM   Result Value Ref Range    Case Report       Non-gynecologic Cytology                          Case: [id number]                                  Authorizing Provider:  Amor Lloyd MD        Collected:           09/13/2023 0827              Ordering Location:     [facility]        Received:            09/13/2023 0827                                     [facility] [place]                                                               Pathologist:           Oren Salas MD                                                       Specimen:    Urine, Clean Catch                                                                         Final Diagnosis       A. Urine, Clean Catch:  Negative for high grade urothelial carcinoma ([facility]) - see comment.  Benign urothelial cells.  Rare neutrophils.    Satisfactory for evaluation.    Comment:  The above diagnostic category is from the recently published book, The Nichole System for Reporting Urinary Cytology, and is in keeping with the ongoing effort for utilization of standardized diagnostic terminology in urine cytology.*    *The Nichole System for Reporting Urinary Cytology. Tamela Womack, Joanne Berry, Amor Broussard; 2016.           Note       Interpretation performed at [facility], [address] [address]        Gross Description       A. 45 mL of yellow, hazy fluid, received in [facility]       Additional Information       [employer]'s FDA approved ,  and ThinPrep Imaging Duo System are utilized with strict adherence to the 's instruction manual to prepare gynecologic and non-gynecologic cytology specimens for the production of ThinPrep slides as well as for gynecologic ThinPrep imaging. These processes have been validated by our laboratory and/or by the .    These tests were developed and their performance characteristics determined by [facility] or [facility]. They may not be cleared or approved by the U.S. Food and Drug Administration. The FDA has determined that such clearance or approval is not necessary. These tests are used for clinical purposes. They should not be regarded as investigational or for research. This laboratory has been approved by CLIA 88, designated as a high-complexity laboratory and is qualified to perform these tests.       Hepatic function panel Lab Collect    Collection Time: 10/06/23  8:16 AM   Result Value Ref Range    Total Bilirubin 0.72 0.20 - 1.00 mg/dL    Bilirubin, Direct 0.16 0.00 - 0.20 mg/dL    Alkaline Phosphatase 53 34 - 104 U/L    AST 24 13 - 39 U/L    ALT 21 7 - 52 U/L    Total Protein 7.2 6.4 - 8.4 g/dL    Albumin 4.1 3.5 - 5.0 g/dL   CPAP and BiLevel Resupply Package (Without F2F Documentation)    Collection Time: 10/11/23 12:25 PM   Result Value Ref Range    Supplier Name [employer]     Supplier Phone Number [phone]     Order Status Confirmation In Progress     Delivery Note      Delivery Request Date 10/11/2023     Item Description CPAP and BiLevel Resupply Package, Nasal     Item Description       PAP Mask, Nasal, Fit Upon Setup, N/A, 1 per 3 months    Item Description PAP Humidifier, Heated     Item Description Disposable PAP Filter, 2 per 1 month     Item Description Non-Disposable PAP Filter, 1 per 6 months     Item Description PAP Mask Interface Cushion, Nasal, 2 per 1 month     Item Description PAP Machine Tubing, Heated, 1 per 3 months     Item Description Humidifier Water Chamber, 1 per 6 months     Item Description PAP Headgear, 1 per 6 months     Item Description Nasal Pillows Included, Nasal Mask, 2 per 1 month    Hemoglobin A1C    Collection Time: 12/08/23  9:34 AM   Result Value Ref Range    Hemoglobin A1C 6.8 (H) Normal 4.0-5.6%; PreDiabetic 5.7-6.4%; Diabetic >=6.5%; Glycemic control for adults with diabetes <7.0% %     mg/dl   Hemoglobin A1C    Collection Time:  04/19/24  8:30 AM   Result Value Ref Range    Hemoglobin A1C 6.9 (H) Normal 4.0-5.6%; PreDiabetic 5.7-6.4%; Diabetic >=6.5%; Glycemic control for adults with diabetes <7.0% %     mg/dl   Albumin / creatinine urine ratio    Collection Time: 04/19/24  8:30 AM   Result Value Ref Range    Creatinine, Ur 141.9 Reference range not established. mg/dL    Albumin,U,Random 56.7 (H) <20.0 mg/L    Albumin Creat Ratio 40 (H) 0 - 30 mg/g creatinine   T4, free    Collection Time: 04/19/24  8:30 AM   Result Value Ref Range    Free T4 0.72 0.61 - 1.12 ng/dL   TSH, 3rd generation    Collection Time: 04/19/24  8:30 AM   Result Value Ref Range    TSH 3RD GENERATON 0.974 0.450 - 4.500 uIU/mL       Laboratory Results: I have personally reviewed the pertinent laboratory results/reports     Radiology/Other Diagnostic Testing Results: I have personally reviewed pertinent reports.      US abdomen complete    Result Date: 11/7/2023  ABDOMEN ULTRASOUND, COMPLETE INDICATION:   E80.6: Other disorders of bilirubin metabolism. 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 No pain. COMPARISON: CT chest, abdomen pelvis 10/25/2021 TECHNIQUE:   Real-time ultrasound of the abdomen was performed with a curvilinear transducer with both volumetric sweeps and still imaging techniques. FINDINGS: PANCREAS:  Visualized portions of the pancreas are within normal limits. AORTA AND IVC:  Visualized portions are normal for patient age. LIVER: Size:  Within normal range.  The liver measures 17.0 cm in the midclavicular line. Contour:  Surface contour is smooth. Parenchyma:  Echogenicity and echotexture are within normal limits. No liver mass identified. Limited imaging of the main portal vein shows it to be patent and flow in the appropriate direction with a PSV of 39.0 cm/s. BILIARY: There are multiple gallbladder polyps, largest measuring 6 mm. There are varied morphologies with most exhibiting a ball on the wall appearance, and a few exhibiting sessile  morphology. According to current consensus recommendations (SRU 2022; 000:1-12),  for polyps of this size morphology, no additional follow-up is needed. Reference: Management of Incidentally Detected Gallbladder Polyps: Society of Radiologists in Ultrasound Consensus Conference Recommendations. Radiology 2022; 000:1-12. https://pubs.rsna.org/doi/full/10.1148/radiol.666968 The gallbladder is normal in caliber. No wall thickening or pericholecystic fluid. Shadowing gallstone(s) and biliary sludge identified. No sonographic Martino's sign. No intrahepatic biliary dilatation. CBD measures 5.0 mm. No choledocholithiasis. KIDNEY: Right kidney measures 10.1 x 5.9 x 6.5  cm. Volume 201.5 mL Multiple benign-appearing cysts, some with thin septations, largest measuring 2.4 cm. Left kidney measures 10.7 x 5.8 x 6.0 cm. Volume 195.8 mL Kidney within normal limits. SPLEEN: Measures 8.3 x 8.7 x 2.6 cm. Volume 99.4 mL Within normal limits. ASCITES:  None.     1. Cholelithiasis and biliary sludge without sonographic evidence of acute cholecystitis. No biliary duct dilation. 2. Multiple gallbladder polyps as described above, no additional follow-up needed. 3. Multiple benign-appearing right-sided renal cysts, no additional follow-up needed. Resident: CHINO MCCURDY I, the attending radiologist, have reviewed the images and agree with the final report above. Workstation performed: [id number]        Assessment/Plan:  1. Essential hypertension  Assessment & Plan:  Well controlled on current therapy continue with current medications and will reassess next visit    2. Annual physical exam  Assessment & Plan:  Check routine labs    Orders:  -     PSA, Total Screen; Future  -     Lipid panel; Future; Expected date: 07/08/2024  -     Comprehensive metabolic panel; Future; Expected date: 07/08/2024  -     CBC and differential; Future  3. Change in bowel movement  Assessment & Plan:  Off and on over a couple of yrs has bowel urge   "incontinence   4-5 episodes over past 2 yrs  needs to see dr Gonzales  Orders:  -     Ambulatory Referral to Gastroenterology; Future  4. BPH with obstruction/lower urinary tract symptoms  Assessment & Plan:  Pt followed by urology   5. Type 2 diabetes mellitus without complication, without long-term current use of insulin (HCC)  Assessment & Plan:    Lab Results   Component Value Date    HGBA1C 6.9 (H) 04/19/2024   Well controlled  monitered by endo   6. Vitamin D deficiency  Assessment & Plan:  On supplement   7. Myasthenia gravis (HCC)  Assessment & Plan:  Followed by neurology ok on meds   8. JERO (obstructive sleep apnea)  Assessment & Plan:  Ok on cpap  9. Lumbar disc herniation  Assessment & Plan:  No back pain  10. Dyslipidemia  Assessment & Plan:  Due for lipids   11. Stage 2 chronic kidney disease  Assessment & Plan:  Lab Results   Component Value Date    EGFR 59 08/10/2023    EGFR 70 01/04/2023    EGFR 67 09/28/2022    CREATININE 1.27 08/10/2023    CREATININE 1.11 01/04/2023    CREATININE 1.15 09/28/2022   Check labs   12. Diastasis of rectus abdominis  Assessment & Plan:  asymptomatic                   Read package inserts for all medications before starting a new medications, call me if you have any questions.    Patient was given opportunity to ask questions and all questions were answered.    Disclaimer: Portions of the record may have been created with voice recognition software. Occasional wrong word or \"sound a like\" substitutions may have occurred due to the inherent limitations of voice recognition software. Read the chart carefully and recognize, using context, where substitutions have occurred. I have used the Epic copy/forward function to compose this note. I have reviewed my current note to ensure it reflects the current patient status, exam, assessment and plan.  "

## 2024-07-08 NOTE — ASSESSMENT & PLAN NOTE
Lab Results   Component Value Date    HGBA1C 6.9 (H) 04/19/2024   Well controlled  monitered by endo

## 2024-07-08 NOTE — ASSESSMENT & PLAN NOTE
Off and on over a couple of yrs has bowel urge  incontinence   4-5 episodes over past 2 yrs  needs to see dr Gonzales

## 2024-07-17 ENCOUNTER — TELEPHONE (OUTPATIENT)
Age: 64
End: 2024-07-17

## 2024-08-02 DIAGNOSIS — I10 ESSENTIAL HYPERTENSION: ICD-10-CM

## 2024-08-02 RX ORDER — TRIAMTERENE AND HYDROCHLOROTHIAZIDE 37.5; 25 MG/1; MG/1
1 TABLET ORAL DAILY
Qty: 100 TABLET | Refills: 1 | Status: SHIPPED | OUTPATIENT
Start: 2024-08-02

## 2024-08-06 DIAGNOSIS — E11.9 TYPE 2 DIABETES MELLITUS WITHOUT COMPLICATION, WITHOUT LONG-TERM CURRENT USE OF INSULIN (HCC): ICD-10-CM

## 2024-08-06 RX ORDER — BLOOD-GLUCOSE SENSOR
1 EACH MISCELLANEOUS
Qty: 2 EACH | Refills: 5 | Status: SHIPPED | OUTPATIENT
Start: 2024-08-06 | End: 2024-08-13

## 2024-08-07 ENCOUNTER — TELEPHONE (OUTPATIENT)
Age: 64
End: 2024-08-07

## 2024-08-07 NOTE — TELEPHONE ENCOUNTER
PA for Continuous Glucose Sensor (FreeStyle Dagoberto 3 Sensor  Denied    Reason:         Message sent to office clinical pool Yes    Denial letter scanned into Media Yes    Appeal started No ( Provider will need to decide if appeal is warranted and send clinical documentation to Prior Authorization Team for initiation.)    **Please follow up with your patient regarding denial and next steps**  Alternative: Dexcom continuous glucose monitor

## 2024-08-07 NOTE — TELEPHONE ENCOUNTER
PA for Continuous Glucose Sensor (FreeStyle Dagoberto 3 Sensor) MISC   SUBMITTED     via    []CMM-KEY    [x]Cherry-Case ID # [id number]  []Faxed to plan   []Other website    []Phone call Case ID #      Office notes sent, clinical questions answered. Awaiting determination    Turnaround time for your insurance to make a decision on your Prior Authorization can take 7-21 business days.

## 2024-08-13 DIAGNOSIS — E11.9 TYPE 2 DIABETES MELLITUS WITHOUT COMPLICATION, WITHOUT LONG-TERM CURRENT USE OF INSULIN (HCC): Primary | ICD-10-CM

## 2024-08-13 RX ORDER — ACYCLOVIR 400 MG/1
1 TABLET ORAL CONTINUOUS
Qty: 1 EACH | Refills: 0 | Status: SHIPPED | OUTPATIENT
Start: 2024-08-13

## 2024-08-13 RX ORDER — ACYCLOVIR 400 MG/1
1 TABLET ORAL
Qty: 9 EACH | Refills: 0 | Status: SHIPPED | OUTPATIENT
Start: 2024-08-13

## 2024-08-20 DIAGNOSIS — E11.9 DIABETES MELLITUS WITHOUT COMPLICATION (HCC): ICD-10-CM

## 2024-08-20 RX ORDER — ICOSAPENT ETHYL 1 G/1
1 CAPSULE ORAL 2 TIMES DAILY
Qty: 360 CAPSULE | Refills: 0 | Status: SHIPPED | OUTPATIENT
Start: 2024-08-20 | End: 2024-08-23

## 2024-08-23 DIAGNOSIS — E11.9 DIABETES MELLITUS WITHOUT COMPLICATION (HCC): ICD-10-CM

## 2024-08-23 RX ORDER — ICOSAPENT ETHYL 1 G/1
2 CAPSULE ORAL 2 TIMES DAILY
Qty: 360 CAPSULE | Refills: 1 | Status: SHIPPED | OUTPATIENT
Start: 2024-08-23

## 2024-09-14 DIAGNOSIS — E11.65 INADEQUATELY CONTROLLED DIABETES MELLITUS (HCC): ICD-10-CM

## 2024-09-14 DIAGNOSIS — E78.2 MIXED HYPERLIPIDEMIA: ICD-10-CM

## 2024-09-14 DIAGNOSIS — E11.9 TYPE 2 DIABETES MELLITUS WITHOUT COMPLICATION, WITHOUT LONG-TERM CURRENT USE OF INSULIN (HCC): ICD-10-CM

## 2024-09-14 DIAGNOSIS — I10 ESSENTIAL (PRIMARY) HYPERTENSION: ICD-10-CM

## 2024-09-16 RX ORDER — ACYCLOVIR 400 MG/1
1 TABLET ORAL CONTINUOUS
Qty: 1 EACH | Refills: 0 | Status: SHIPPED | OUTPATIENT
Start: 2024-09-16

## 2024-09-16 RX ORDER — EMPAGLIFLOZIN 10 MG/1
10 TABLET, FILM COATED ORAL DAILY
Qty: 90 TABLET | Refills: 1 | Status: SHIPPED | OUTPATIENT
Start: 2024-09-16

## 2024-09-16 RX ORDER — OLMESARTAN MEDOXOMIL 40 MG/1
TABLET ORAL
Qty: 90 TABLET | Refills: 1 | Status: SHIPPED | OUTPATIENT
Start: 2024-09-16

## 2024-09-16 RX ORDER — ATORVASTATIN CALCIUM 40 MG/1
40 TABLET, FILM COATED ORAL
Qty: 90 TABLET | Refills: 2 | Status: SHIPPED | OUTPATIENT
Start: 2024-09-16

## 2024-09-30 NOTE — PROGRESS NOTES
Ambulatory Visit  Name: Garry Koch      : 1960      MRN: [id number]  Encounter Provider: Amor Lloyd MD  Encounter Date: 10/1/2024   Encounter department: [facility] [place]    Assessment & Plan  Malignant neoplasm of lateral wall of urinary bladder (HCC)    Orders:    Cytology, urine    BPH with obstruction/lower urinary tract symptoms  Voiding well at this time, some lateral lobe hypertrophy on cystoscopy today, can consider medications for his outlet in the future as needed         Cystitis glandularis  Surveillance cystoscopy shows normal bladder mucosa, will continue surveillance cystoscopy once more in 1 year, if normal can then see us PRN         Encounter to discuss test results  I have spent a total time of 30 minutes in caring for this patient on the day of the visit/encounter including Diagnostic results, Prognosis, Risks and benefits of tx options, Instructions for management, Patient and family education, Importance of tx compliance, Risk factor reductions, Impressions, Counseling / Coordination of care, Documenting in the medical record, and Reviewing / ordering tests, medicine, procedures  .                History of Present Illness     Garry Koch is a 64 y.o. male who presents for follow up of LUTS and history of cystitis glandularis    No new complaints    Doing well    The following portions of the patient's history were reviewed and updated as appropriate: allergies, current medications, past family history, past medical history, past social history, past surgical history and problem list.        Review of Systems   Constitutional: Negative.    HENT: Negative.     Eyes: Negative.    Respiratory: Negative.     Cardiovascular: Negative.    Gastrointestinal: Negative.    Endocrine: Negative.    Genitourinary: Negative.    Musculoskeletal: Negative.    Skin: Negative.    Allergic/Immunologic: Negative.    Neurological: Negative.    Hematological:  Negative.    Psychiatric/Behavioral: Negative.             AUA SYMPTOM SCORE      Flowsheet Row Most Recent Value   AUA SYMPTOM SCORE    How often have you had a sensation of not emptying your bladder completely after you finished urinating? 1 (P)     How often have you had to urinate again less than two hours after you finished urinating? 2 (P)     How often have you found you stopped and started again several times when you urinate? 1 (P)     How often have you found it difficult to postpone urination? 1 (P)     How often have you had a weak urinary stream? 0 (P)     How often have you had to push or strain to begin urination? 0 (P)     How many times did you most typically get up to urinate from the time you went to bed at night until the time you got up in the morning? 3 (P)     Quality of Life: If you were to spend the rest of your life with your urinary condition just the way it is now, how would you feel about that? 4 (P)     AUA SYMPTOM SCORE 8 (P)            Objective     There were no vitals taken for this visit.  Physical Exam  Vitals reviewed.   Constitutional:       General: He is not in acute distress.     Appearance: Normal appearance. He is well-developed. He is not ill-appearing, toxic-appearing or diaphoretic.   HENT:      Head: Normocephalic and atraumatic.      Nose: Nose normal.      Mouth/Throat:      Mouth: Mucous membranes are moist.   Eyes:      General: No scleral icterus.        Right eye: No discharge.         Left eye: No discharge.   Neck:      Thyroid: No thyromegaly.      Trachea: No tracheal deviation.   Pulmonary:      Effort: Pulmonary effort is normal.   Chest:      Chest wall: No tenderness.   Abdominal:      General: There is no distension.      Palpations: There is no mass.      Tenderness: There is no abdominal tenderness. There is no guarding.      Hernia: No hernia is present.   Musculoskeletal:         General: No deformity or signs of injury. Normal range of motion.       Cervical back: Normal range of motion and neck supple.   Lymphadenopathy:      Cervical: No cervical adenopathy.   Skin:     General: Skin is dry.      Coloration: Skin is not jaundiced or pale.      Findings: No erythema.   Neurological:      Mental Status: He is alert and oriented to person, place, and time.      Cranial Nerves: No cranial nerve deficit.      Motor: No abnormal muscle tone.      Coordination: Coordination normal.   Psychiatric:         Mood and Affect: Mood normal.         Behavior: Behavior normal.         Thought Content: Thought content normal.         Judgment: Judgment normal.       Results  Lab Results   Component Value Date    PSA 2.60 08/10/2023    PSA 2.2 06/28/2022    PSA 2.3 03/08/2021     Lab Results   Component Value Date    CALCIUM 9.4 08/10/2023    K 3.9 08/10/2023    CO2 32 08/10/2023     (H) 08/10/2023    BUN 14 08/10/2023    CREATININE 1.27 08/10/2023     Lab Results   Component Value Date    WBC 5.34 08/10/2023    HGB 14.7 08/10/2023    HCT 46.0 08/10/2023    MCV 87 08/10/2023     08/10/2023       Office Urine Dip  No results found for this or any previous visit (from the past 1 hour(s)).]    Administrative Statements

## 2024-10-01 ENCOUNTER — PROCEDURE VISIT (OUTPATIENT)
Dept: UROLOGY | Facility: CLINIC | Age: 64
End: 2024-10-01
Payer: COMMERCIAL

## 2024-10-01 VITALS
WEIGHT: 185.2 LBS | BODY MASS INDEX: 29.77 KG/M2 | HEIGHT: 66 IN | OXYGEN SATURATION: 98 % | SYSTOLIC BLOOD PRESSURE: 154 MMHG | DIASTOLIC BLOOD PRESSURE: 78 MMHG | HEART RATE: 87 BPM

## 2024-10-01 DIAGNOSIS — N40.1 BPH WITH OBSTRUCTION/LOWER URINARY TRACT SYMPTOMS: ICD-10-CM

## 2024-10-01 DIAGNOSIS — N30.80 CYSTITIS GLANDULARIS: ICD-10-CM

## 2024-10-01 DIAGNOSIS — C67.2 MALIGNANT NEOPLASM OF LATERAL WALL OF URINARY BLADDER (HCC): ICD-10-CM

## 2024-10-01 DIAGNOSIS — Z71.2 ENCOUNTER TO DISCUSS TEST RESULTS: Primary | ICD-10-CM

## 2024-10-01 DIAGNOSIS — N13.8 BPH WITH OBSTRUCTION/LOWER URINARY TRACT SYMPTOMS: ICD-10-CM

## 2024-10-01 PROCEDURE — 88112 CYTOPATH CELL ENHANCE TECH: CPT | Performed by: PATHOLOGY

## 2024-10-01 PROCEDURE — 99214 OFFICE O/P EST MOD 30 MIN: CPT | Performed by: UROLOGY

## 2024-10-01 PROCEDURE — 52000 CYSTOURETHROSCOPY: CPT | Performed by: UROLOGY

## 2024-10-01 NOTE — ASSESSMENT & PLAN NOTE
I have spent a total time of 30 minutes in caring for this patient on the day of the visit/encounter including Diagnostic results, Prognosis, Risks and benefits of tx options, Instructions for management, Patient and family education, Importance of tx compliance, Risk factor reductions, Impressions, Counseling / Coordination of care, Documenting in the medical record, and Reviewing / ordering tests, medicine, procedures  .

## 2024-10-01 NOTE — ASSESSMENT & PLAN NOTE
Voiding well at this time, some lateral lobe hypertrophy on cystoscopy today, can consider medications for his outlet in the future as needed

## 2024-10-01 NOTE — ASSESSMENT & PLAN NOTE
Surveillance cystoscopy shows normal bladder mucosa, will continue surveillance cystoscopy once more in 1 year, if normal can then see us PRN

## 2024-10-01 NOTE — PROGRESS NOTES
Office Cystoscopy Procedure Note    Indication:    Cystitis glandularis, surveillance    Informed consent   The risks, benefits, complications, treatment options, and expected outcomes were discussed with the patient. The patient concurred with the proposed plan and provided informed consent.    Anesthesia  Lidocaine jelly 2%    Antibiotic prophylaxis   None    Procedure  The patient was placed in the supineposition, was prepped and draped in the usual manner using sterile technique, and 2% lidocaine jelly instilled into the urethra.  A 17 F flexible cystoscope was then inserted into the urethra and the urethra and bladder carefully examined.  The following findings were noted:    Findings:  Urethra:  Normal  Prostate:  mild lateral lobe hypertrophy, no median lobe, no lesions  Bladder:  Normal mucosa, no lesions  Ureteral orifices:  orthotopic  Other findings:  None, retroflexed view confirms    Specimens: None                 Complications:    None; patient tolerated the procedure well           Disposition: To home            Condition: Stable    Plan: Normal surveillance cystoscopy s/p resection of cystitis glandularis, recommend one further surveillance cystoscopy in 1 year       Cystoscopy     Date/Time  10/1/2024 1:00 PM     Performed by  Amor Lloyd MD   Authorized by  Amor Lloyd MD     Universal Protocol:  procedure performed by consultantConsent: Verbal consent obtained. Written consent obtained.  Risks and benefits: risks, benefits and alternatives were discussed  Consent given by: patient  Patient understanding: patient states understanding of the procedure being performed  Patient consent: the patient's understanding of the procedure matches consent given  Procedure consent: procedure consent matches procedure scheduled  Relevant documents: relevant documents present and verified  Test results: test results available and properly labeled  Site marked: the operative site was not  marked  Radiology Images displayed and confirmed. If images not available, report reviewed: imaging studies available  Required items: required blood products, implants, devices, and special equipment available  Patient identity confirmed: verbally with patient and provided demographic data      Procedure Details:  Procedure type: cystoscopy    Patient tolerance: Patient tolerated the procedure well with no immediate complications    Additional Procedure Details: Office Cystoscopy Procedure Note    Indication:    Cystitis glandularis, surveillance    Informed consent   The risks, benefits, complications, treatment options, and expected outcomes were discussed with the patient. The patient concurred with the proposed plan and provided informed consent.    Anesthesia  Lidocaine jelly 2%    Antibiotic prophylaxis   None    Procedure  The patient was placed in the supineposition, was prepped and draped in the usual manner using sterile technique, and 2% lidocaine jelly instilled into the urethra.  A 17 F flexible cystoscope was then inserted into the urethra and the urethra and bladder carefully examined.  The following findings were noted:    Findings:  Urethra:  Normal  Prostate:  mild lateral lobe hypertrophy, no median lobe, no lesions  Bladder:  Normal mucosa, no lesions  Ureteral orifices:  orthotopic  Other findings:  None, retroflexed view confirms    Specimens: None                 Complications:    None; patient tolerated the procedure well           Disposition: To home            Condition: Stable    Plan: Normal surveillance cystoscopy s/p resection of cystitis glandularis, recommend one further surveillance cystoscopy in 1 year

## 2024-10-02 ENCOUNTER — OFFICE VISIT (OUTPATIENT)
Dept: ENDOCRINOLOGY | Facility: CLINIC | Age: 64
End: 2024-10-02
Payer: COMMERCIAL

## 2024-10-02 VITALS
HEART RATE: 80 BPM | TEMPERATURE: 97.2 F | DIASTOLIC BLOOD PRESSURE: 78 MMHG | WEIGHT: 183 LBS | SYSTOLIC BLOOD PRESSURE: 134 MMHG | HEIGHT: 66 IN | BODY MASS INDEX: 29.41 KG/M2 | OXYGEN SATURATION: 98 %

## 2024-10-02 DIAGNOSIS — E78.5 DYSLIPIDEMIA: ICD-10-CM

## 2024-10-02 DIAGNOSIS — E55.9 VITAMIN D DEFICIENCY: ICD-10-CM

## 2024-10-02 DIAGNOSIS — E11.9 TYPE 2 DIABETES MELLITUS WITHOUT COMPLICATION, WITHOUT LONG-TERM CURRENT USE OF INSULIN (HCC): Primary | ICD-10-CM

## 2024-10-02 PROCEDURE — 95251 CONT GLUC MNTR ANALYSIS I&R: CPT | Performed by: INTERNAL MEDICINE

## 2024-10-02 PROCEDURE — 99214 OFFICE O/P EST MOD 30 MIN: CPT | Performed by: INTERNAL MEDICINE

## 2024-10-02 NOTE — PROGRESS NOTES
"    Follow-up Patient Progress Note      CC: Type 2 diabetes     History of Present Illness:   63-year-old male with type 2 diabetes, HTN, HLD, myasthenia gravis 2014 diagnosed clinically and on mestinon by Dr. Dalton/neurology, Hx of rectal carcinoid tumor s/p resection 2011, Hx pancreatitis, cholelithiasis, JERO, obesity BMI 31, CKD 2 eGFR 60 and vit d deficiency. Last visit was 5/20/24.     Since last visit, he lost 5 lbs.     CGM data review::  Device: dexcom  Dates:  9/23/24-10/2/24             Usage: 95.3 %   Av glu: 179 mg/dL                   SD:  mg/dL            CV: 35  %            GMI:7.6  %  TIR: 53 %        TAR: 32+15 %               TBR: 0 %     Glycemic patters:  excellent control but occasional postprandial hyperglycemia and a tendency for fasting lows.  Hypoglycemia: No     Current meds:  Tresiba 50 units nightly  Januvia 100 mg daily  Jardiance 10 mg daily  Metformin 1000 mg p.o. twice daily     Opthamology: Yes  Podiatry: No  vaccination:   Dental:  Pancreatitis: no     Ace/ARB: Olmesartan 40 Mg daily  Statin:Lipitor 40  Thyroid issues: No         Physical Exam:  Body mass index is 29.54 kg/m².  /78 (BP Location: Left arm, Patient Position: Sitting, Cuff Size: Standard)   Pulse 80   Temp (!) 97.2 °F (36.2 °C) (Temporal)   Ht 5' 6\" (1.676 m)   Wt 83 kg (183 lb)   SpO2 98%   BMI 29.54 kg/m²    Vitals:    10/02/24 1217   Weight: 83 kg (183 lb)        Physical Exam  Constitutional:       General: He is not in acute distress.     Appearance: He is well-developed.   HENT:      Head: Normocephalic and atraumatic.      Nose: Nose normal.   Eyes:      Conjunctiva/sclera: Conjunctivae normal.   Pulmonary:      Effort: Pulmonary effort is normal.   Abdominal:      General: There is no distension.   Musculoskeletal:      Cervical back: Normal range of motion and neck supple.   Skin:     Findings: No rash.      Comments: No icterus   Neurological:      Mental Status: He is alert and oriented to " person, place, and time.         Labs:   Lab Results   Component Value Date    HGBA1C 6.9 (H) 04/19/2024       Lab Results   Component Value Date    AGU5LOQBCLIM 0.974 04/19/2024       Lab Results   Component Value Date    CREATININE 1.27 08/10/2023    CREATININE 1.11 01/04/2023    CREATININE 1.15 09/28/2022    BUN 14 08/10/2023    K 3.9 08/10/2023     (H) 08/10/2023    CO2 32 08/10/2023     eGFR   Date Value Ref Range Status   08/10/2023 59 ml/min/1.73sq m Final       Lab Results   Component Value Date    ALT 21 10/06/2023    AST 24 10/06/2023    ALKPHOS 53 10/06/2023       Lab Results   Component Value Date    CHOLESTEROL 122 08/10/2023    CHOLESTEROL 99 01/04/2023    CHOLESTEROL 125 02/15/2022     Lab Results   Component Value Date    HDL 49 08/10/2023    HDL 44 01/04/2023    HDL 44 02/15/2022     Lab Results   Component Value Date    TRIG 148 08/10/2023    TRIG 128 01/04/2023    TRIG 130 02/15/2022     Lab Results   Component Value Date    NONHDLC 73 08/10/2023    NONHDLC 55 01/04/2023    NONHDLC 81 02/15/2022         Assessment/Plan:    1. Type 2 diabetes mellitus without complication, without long-term current use of insulin (HCC)  Assessment & Plan:  He is reasonably controlled. AGP showed mostly pp hyperglycemia.    Today we agreed to continue Tresiba 50 units nightly, Jardiance 10 mg daily, Metformin 1000 mg p.o. twice daily and add Ozempic. Will stop Januvia.  Advised to use cgm for monitoring hypoglycemia and modifying behaviors.    Follow up in 3 months.    Lab Results   Component Value Date    HGBA1C 6.9 (H) 04/19/2024     Orders:  -     semaglutide, 0.25 or 0.5 mg/dose, (Ozempic, 0.25 or 0.5 MG/DOSE,) 2 mg/3 mL injection pen; Use 0.25mg weekly for 4 weeks then 0.5mg weekly  -     Hemoglobin A1C; Future  2. Dyslipidemia  Assessment & Plan:  Continue lipitor  3. Vitamin D deficiency  Assessment & Plan:  Take voit D3 2000IU daily OTC        I have spent a total time of 30 minutes on 10/02/24 in  caring for this patient including greater than 50% of this time was spent in counseling/coordination of care as listed above.       Discussed with the patient and all questioned fully answered. He will contact me with concerns.    Alden Fernandez

## 2024-10-02 NOTE — ASSESSMENT & PLAN NOTE
He is reasonably controlled. AGP showed mostly pp hyperglycemia.    Today we agreed to continue Tresiba 50 units nightly, Jardiance 10 mg daily, Metformin 1000 mg p.o. twice daily and add Ozempic. Will stop Januvia.  Advised to use cgm for monitoring hypoglycemia and modifying behaviors.    Follow up in 3 months.    Lab Results   Component Value Date    HGBA1C 6.9 (H) 04/19/2024

## 2024-10-03 PROCEDURE — 88112 CYTOPATH CELL ENHANCE TECH: CPT | Performed by: PATHOLOGY

## 2024-10-24 DIAGNOSIS — E11.9 TYPE 2 DIABETES MELLITUS WITHOUT COMPLICATION, WITHOUT LONG-TERM CURRENT USE OF INSULIN (HCC): ICD-10-CM

## 2024-10-24 RX ORDER — ACYCLOVIR 400 MG/1
1 TABLET ORAL CONTINUOUS
Qty: 1 EACH | Refills: 0 | Status: SHIPPED | OUTPATIENT
Start: 2024-10-24

## 2024-11-05 DIAGNOSIS — E11.9 TYPE 2 DIABETES MELLITUS WITHOUT COMPLICATION, WITHOUT LONG-TERM CURRENT USE OF INSULIN (HCC): ICD-10-CM

## 2024-11-05 RX ORDER — INSULIN DEGLUDEC 200 U/ML
50 INJECTION, SOLUTION SUBCUTANEOUS EVERY MORNING
Qty: 18 ML | Refills: 1 | Status: SHIPPED | OUTPATIENT
Start: 2024-11-05

## 2024-11-09 DIAGNOSIS — I10 ESSENTIAL HYPERTENSION: ICD-10-CM

## 2024-11-09 RX ORDER — AMLODIPINE BESYLATE 10 MG/1
TABLET ORAL
Qty: 90 TABLET | Refills: 1 | Status: SHIPPED | OUTPATIENT
Start: 2024-11-09

## 2024-11-18 ENCOUNTER — APPOINTMENT (OUTPATIENT)
Dept: LAB | Facility: AMBULARY SURGERY CENTER | Age: 64
End: 2024-11-18
Payer: COMMERCIAL

## 2024-11-18 ENCOUNTER — OFFICE VISIT (OUTPATIENT)
Dept: GASTROENTEROLOGY | Facility: AMBULARY SURGERY CENTER | Age: 64
End: 2024-11-18
Payer: COMMERCIAL

## 2024-11-18 VITALS
OXYGEN SATURATION: 98 % | WEIGHT: 184.6 LBS | BODY MASS INDEX: 29.67 KG/M2 | SYSTOLIC BLOOD PRESSURE: 148 MMHG | HEART RATE: 99 BPM | HEIGHT: 66 IN | DIASTOLIC BLOOD PRESSURE: 88 MMHG

## 2024-11-18 DIAGNOSIS — R19.7 DIARRHEA, UNSPECIFIED TYPE: ICD-10-CM

## 2024-11-18 DIAGNOSIS — R19.8 CHANGE IN BOWEL MOVEMENT: ICD-10-CM

## 2024-11-18 DIAGNOSIS — R19.7 DIARRHEA, UNSPECIFIED TYPE: Primary | ICD-10-CM

## 2024-11-18 LAB
GLIADIN PEPTIDE IGA SER-ACNC: 0.2 U/ML
GLIADIN PEPTIDE IGA SER-ACNC: NEGATIVE
GLIADIN PEPTIDE IGG SER-ACNC: <0.4 U/ML
GLIADIN PEPTIDE IGG SER-ACNC: NEGATIVE
IGA SERPL-MCNC: 138 MG/DL (ref 66–433)
TTG IGA SER-ACNC: <0.5 U/ML
TTG IGA SER-ACNC: NEGATIVE
TTG IGG SER-ACNC: <0.8 U/ML
TTG IGG SER-ACNC: NEGATIVE

## 2024-11-18 PROCEDURE — 82784 ASSAY IGA/IGD/IGG/IGM EACH: CPT

## 2024-11-18 PROCEDURE — 36415 COLL VENOUS BLD VENIPUNCTURE: CPT

## 2024-11-18 PROCEDURE — 99244 OFF/OP CNSLTJ NEW/EST MOD 40: CPT | Performed by: INTERNAL MEDICINE

## 2024-11-18 PROCEDURE — 86364 TISS TRNSGLTMNASE EA IG CLAS: CPT

## 2024-11-18 PROCEDURE — 86258 DGP ANTIBODY EACH IG CLASS: CPT

## 2024-11-18 RX ORDER — SODIUM CHLORIDE, SODIUM LACTATE, POTASSIUM CHLORIDE, CALCIUM CHLORIDE 600; 310; 30; 20 MG/100ML; MG/100ML; MG/100ML; MG/100ML
125 INJECTION, SOLUTION INTRAVENOUS CONTINUOUS
OUTPATIENT
Start: 2024-11-18

## 2024-11-18 RX ORDER — BISACODYL 5 MG/1
10 TABLET, DELAYED RELEASE ORAL ONCE
Qty: 2 TABLET | Refills: 0 | Status: SHIPPED | OUTPATIENT
Start: 2024-11-18 | End: 2024-11-18

## 2024-11-18 NOTE — PROGRESS NOTES
Consultation -  [facility]  Garry Koch 1960 male         ASSESSMENT & PLAN:    Diarrhea  Patient reports having only occasional episodes of uncontrollable loose bowel movements with regular normal bowel movements most of the time.  Rule out secondary to autonomic neuropathy or diet related.  Rule out pancreatic insufficiency.  Rule out celiac disease.  Unlikely to be IBS.  Patient had rectal small carcinoid removed years ago but his diarrhea is not all the time.    -Explained to patient and his wife in detail about different possible etiologies and given reassurance.    -Check stool for pancreatic elastase, total fat    -Check celiac disease panel    -Advised to maintain a diary of the foods that he had prior to the episodes    -Try to avoid milk and dairy products completely for some time to see the response    -Schedule for both EGD and colonoscopy.    -May try probiotics    -High-fiber diet.    -Patient was given instructions about the colonoscopy prep.    -Patient was explained about the risks and benefits of the procedure. Risks including but not limited to bleeding, infection, perforation were explained in detail. Also explained about less than 100% sensitivity with the exam and other alternatives.      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  He describes on and off episodes in the past couple of years.  He wakes up from sleep when he has 2-3 loose bowel movements and also had incontinence.  He tried to figure it out and thought it could be from ice cream or wine but had episodes even when he was not having them.  Denies any blood or mucus in the stool.  Good appetite, no recent weight loss.  Denies any heartburn or acid reflux.   Denies any difficulty swallowing.    Last colonoscopy was in September 2019    Chaperon: Ms. Lindsay    REVIEW OF SYSTEMS: Review of Systems   Constitutional:  Negative for activity change, appetite change, chills, diaphoresis, fatigue, fever and unexpected weight change.   HENT:  Negative for ear discharge, ear pain, facial swelling, hearing loss, nosebleeds, sore throat, tinnitus and voice change.    Eyes:  Negative for pain, discharge, redness, itching and visual disturbance.   Respiratory:  Negative for apnea, cough, chest tightness, shortness of breath and wheezing.    Cardiovascular:  Negative for chest pain and palpitations.   Gastrointestinal:         As noted in HPI   Endocrine: Negative for cold intolerance, heat intolerance and polyuria.   Genitourinary:  Negative for difficulty urinating, dysuria, flank pain, hematuria and urgency.   Musculoskeletal:  Positive for back pain. Negative for arthralgias, gait problem, joint swelling and myalgias.   Skin:  Negative for rash and wound.   Neurological:  Negative for dizziness, tremors, seizures, speech difficulty, light-headedness, numbness and headaches.   Hematological:  Negative for adenopathy. Does not bruise/bleed easily.   Psychiatric/Behavioral:  Negative for agitation, behavioral problems and confusion. The patient is not nervous/anxious.         Past Medical History:   Diagnosis Date    Carcinoid tumor of colon     Carcinoid tumor of rectum     Colon polyp     CPAP (continuous positive airway pressure) dependence     Diabetes mellitus (HCC)     Diabetic eye exam (HCC) 2020    Hyperlipidemia     Hypernatremia     Hypertension     Iron deficiency     Liver hemangioma     Myasthenia gravis (HCC)     Nephropathy     Retinopathy     Scoliosis 2000    Sleep apnea     Urinary tract infection 2003      Past Surgical History:   Procedure Laterality Date    BACK SURGERY  01/01/2001    lump removed     COLONOSCOPY      1/1/12,1/1/2016 tumor removed     CYST  REMOVAL  2024    from back of neck    DENTAL SURGERY      FL RETROGRADE PYELOGRAM  2020    HERNIA REPAIR  2021    NASAL POLYP EXCISION      MS COLONOSCOPY FLX DX W/COLLJ SPEC WHEN PFRMD N/A 2016    Procedure: COLONOSCOPY;  Surgeon: Williams Gonzales MD;  Location: [facility];  Service: Gastroenterology    MS CYSTO BLADDER W/URETERAL CATHETERIZATION Bilateral 2020    Procedure: CYSTOSCOPY WITH RETROGRADE PYELOGRAM;  Surgeon: Amor Lloyd MD;  Location: [facility];  Service: Urology    MS CYSTO W/REMOVAL OF LESIONS SMALL N/A 2020    Procedure: TRANSURETHRAL RESECTION OF BLADDER TUMOR (TURBT);  Surgeon: Amor Lloyd MD;  Location: [facility];  Service: Urology    MS RPR UMBILICAL HRNA 5 YRS/> REDUCIBLE N/A 2021    Procedure: REPAIR HERNIA UMBILICAL WITH MESH;  Surgeon: Walt Coates MD;  Location: [facility];  Service: General    TRANSURETHRAL RESECTION OF PROSTATE       Social History     Socioeconomic History    Marital status: /Civil Union     Spouse name: Not on file    Number of children: Not on file    Years of education: Not on file    Highest education level: Not on file   Occupational History    Not on file   Tobacco Use    Smoking status: Never    Smokeless tobacco: Never   Vaping Use    Vaping status: Never Used   Substance and Sexual Activity    Alcohol use: Yes     Alcohol/week: 3.0 standard drinks of alcohol     Types: 2 Glasses of wine, 1 Cans of beer per week     Comment: 1 per week    Drug use: Never    Sexual activity: Not Currently     Partners: Female     Birth control/protection: Other     Comment: Tubes tied   Other Topics Concern    Not on file   Social History Narrative    Most recent tobacco use screenin2019           Do you currently or have you served in the [employer]:   No          Were you activated, into active duty, as a member of the [employer] or as a Reservist:   No           Advance directive:   No           Chewing tobacco:   none          Alcohol intake:   Moderate          1 a week          Marital status:             Live alone or with others:   with others           Exercise level:   Occasional          General stress level:   Medium          Diet:   Regular          Caffeine intake:   Moderate           Guns present in home:   Yes           Seat belts used routinely:   Yes           Sunscreen used routinely:   Yes           Illicit drugs:   no          Smoke alarm in home:   Yes          Are there stairs in your home:   Yes           Pets:   Yes      4-cats     Per linda     Social Drivers of Health     Financial Resource Strain: Not on file   Food Insecurity: Not on file   Transportation Needs: Not on file   Physical Activity: Not on file   Stress: Not on file   Social Connections: Not on file   Intimate Partner Violence: Not on file   Housing Stability: Not on file     Family History   Problem Relation Age of Onset    Diabetes Mother     Cerebral aneurysm Mother     Heart disease Father     Heart failure Father     Prostate cancer Father     Alcohol abuse Father     No Known Problems Sister      Patient has no known allergies.  Current Outpatient Medications   Medication Sig Dispense Refill    amLODIPine (NORVASC) 10 mg tablet TAKE 1 TABLET BY MOUTH EVERY DAY 90 tablet 1    Ascorbic Acid (VITAMIN C) 500 MG CAPS Take 1 tablet by mouth daily      aspirin 81 MG tablet Take 81 mg by mouth daily.      atorvastatin (LIPITOR) 40 mg tablet TAKE 1 TABLET BY MOUTH EVERYDAY AT BEDTIME 90 tablet 2    B Complex Vitamins (VITAMIN B COMPLEX PO) Take by mouth      B-D UF III MINI PEN NEEDLES 31G X 5 MM MISC Use as prescribed 360 each 3    bisacodyl (DULCOLAX) 5 mg EC tablet Take 2 tablets (10 mg total) by mouth once for 1 dose 2 tablet 0    Cholecalciferol 50 MCG (2000 UT) CAPS Take 2 tablets by mouth 2 (two) times a day Once daily      Cinnamon 500 MG TABS Take by mouth 2 (two) times a day       coenzyme Q-10 100 MG  "capsule Take 2 capsules by mouth daily      Continuous Glucose  (Dexcom G7 ) KAI USE 1 EACH CONTINUOUS 1 each 0    Continuous Glucose Sensor (Dexcom G7 Sensor) Use 1 Device every 10 days 9 each 0    Cyanocobalamin (Vitamin B-12) 2500 MCG SUBL Place under the tongue      Ferrous Sulfate (Iron) 325 (65 Fe) MG TABS Take by mouth 2 (two) times a day      GARLIC PO Take by mouth      Icosapent Ethyl (Vascepa) 1 g CAPS Take 2 capsules (2 g total) by mouth 2 (two) times a day 360 capsule 1    insulin degludec (Tresiba FlexTouch) 200 units/mL CONCENTRATED U-200 injection pen INJECT 50 UNITS UNDER THE SKIN EVERY MORNING 18 mL 1    Jardiance 10 MG TABS tablet TAKE 1 TABLET BY MOUTH EVERY DAY 90 tablet 1    loratadine (CLARITIN) 10 mg tablet Take 10 mg by mouth daily      metFORMIN (GLUCOPHAGE-XR) 500 mg 24 hr tablet TAKE 2 TABLETS WITH BREAKFAST AND 2 TABLETS WITH DINNER 360 tablet 1    metoprolol succinate (TOPROL-XL) 100 mg 24 hr tablet TAKE 1 TABLET BY MOUTH EVERY DAY 90 tablet 3    Multiple Vitamin (MULTI-VITAMIN DAILY) TABS Take 1 tablet by mouth daily      olmesartan (BENICAR) 40 mg tablet TAKE 1 TABLET BY MOUTH EVERY DAY 90 tablet 1    polyethylene glycol (GOLYTELY) 4000 mL solution Take 4,000 mL by mouth once for 1 dose 4000 mL 0    pyridostigmine (MESTINON) 60 mg tablet Take 60 mg by mouth 4 (four) times a day      semaglutide, 0.25 or 0.5 mg/dose, (Ozempic, 0.25 or 0.5 MG/DOSE,) 2 mg/3 mL injection pen Use 0.25mg weekly for 4 weeks then 0.5mg weekly 6 mL 1    triamterene-hydrochlorothiazide (MAXZIDE-25) 37.5-25 mg per tablet TAKE 1 TABLET BY MOUTH EVERY  tablet 1    TURMERIC PO Take by mouth      zinc gluconate 50 mg tablet Take 50 mg by mouth daily       No current facility-administered medications for this visit.       Blood pressure 148/88, pulse 99, height 5' 6\" (1.676 m), weight 83.7 kg (184 lb 9.6 oz), SpO2 98%.    PHYSICAL EXAM: Physical Exam  Constitutional:       Appearance: He is " well-developed.   HENT:      Head: Normocephalic and atraumatic.   Eyes:      General: No scleral icterus.        Right eye: No discharge.         Left eye: No discharge.      Conjunctiva/sclera: Conjunctivae normal.      Pupils: Pupils are equal, round, and reactive to light.   Neck:      Thyroid: No thyromegaly.      Vascular: No JVD.      Trachea: No tracheal deviation.   Cardiovascular:      Rate and Rhythm: Normal rate and regular rhythm.      Heart sounds: Normal heart sounds. No murmur heard.     No friction rub. No gallop.   Pulmonary:      Effort: Pulmonary effort is normal. No respiratory distress.      Breath sounds: Normal breath sounds. No wheezing or rales.   Chest:      Chest wall: No tenderness.   Abdominal:      General: Bowel sounds are normal. There is no distension.      Palpations: Abdomen is soft. There is no mass.      Tenderness: There is no abdominal tenderness. There is no guarding or rebound.      Hernia: No hernia is present.   Musculoskeletal:      Cervical back: Neck supple.   Lymphadenopathy:      Cervical: No cervical adenopathy.   Skin:     General: Skin is warm and dry.      Findings: No erythema or rash.   Neurological:      Mental Status: He is alert and oriented to person, place, and time.   Psychiatric:         Behavior: Behavior normal.         Thought Content: Thought content normal.          Lab Results   Component Value Date    WBC 5.34 08/10/2023    HGB 14.7 08/10/2023    HCT 46.0 08/10/2023    MCV 87 08/10/2023     08/10/2023     Lab Results   Component Value Date    CALCIUM 9.4 08/10/2023    K 3.9 08/10/2023    CO2 32 08/10/2023     (H) 08/10/2023    BUN 14 08/10/2023    CREATININE 1.27 08/10/2023     Lab Results   Component Value Date    ALT 21 10/06/2023    AST 24 10/06/2023    ALKPHOS 53 10/06/2023     Lab Results   Component Value Date    INR 1.06 06/04/2020    PROTIME 13.2 06/04/2020       US abdomen complete  Result Date: 11/7/2023  Impression: 1.  Cholelithiasis and biliary sludge without sonographic evidence of acute cholecystitis. No biliary duct dilation. 2. Multiple gallbladder polyps as described above, no additional follow-up needed. 3. Multiple benign-appearing right-sided renal cysts, no additional follow-up needed. Resident: CHINO MCCURDY I, the attending radiologist, have reviewed the images and agree with the final report above. Workstation performed: [id number]

## 2024-11-18 NOTE — H&P (VIEW-ONLY)
Consultation -  [facility]  Garry Koch 1960 male         ASSESSMENT & PLAN:    Diarrhea  Patient reports having only occasional episodes of uncontrollable loose bowel movements with regular normal bowel movements most of the time.  Rule out secondary to autonomic neuropathy or diet related.  Rule out pancreatic insufficiency.  Rule out celiac disease.  Unlikely to be IBS.  Patient had rectal small carcinoid removed years ago but his diarrhea is not all the time.    -Explained to patient and his wife in detail about different possible etiologies and given reassurance.    -Check stool for pancreatic elastase, total fat    -Check celiac disease panel    -Advised to maintain a diary of the foods that he had prior to the episodes    -Try to avoid milk and dairy products completely for some time to see the response    -Schedule for both EGD and colonoscopy.    -May try probiotics    -High-fiber diet.    -Patient was given instructions about the colonoscopy prep.    -Patient was explained about the risks and benefits of the procedure. Risks including but not limited to bleeding, infection, perforation were explained in detail. Also explained about less than 100% sensitivity with the exam and other alternatives.      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  He describes on and off episodes in the past couple of years.  He wakes up from sleep when he has 2-3 loose bowel movements and also had incontinence.  He tried to figure it out and thought it could be from ice cream or wine but had episodes even when he was not having them.  Denies any blood or mucus in the stool.  Good appetite, no recent weight loss.  Denies any heartburn or acid reflux.   Denies any difficulty swallowing.    Last colonoscopy was in September 2019    Chaperon: Ms. Lindsay    REVIEW OF SYSTEMS: Review of Systems   Constitutional:  Negative for activity change, appetite change, chills, diaphoresis, fatigue, fever and unexpected weight change.   HENT:  Negative for ear discharge, ear pain, facial swelling, hearing loss, nosebleeds, sore throat, tinnitus and voice change.    Eyes:  Negative for pain, discharge, redness, itching and visual disturbance.   Respiratory:  Negative for apnea, cough, chest tightness, shortness of breath and wheezing.    Cardiovascular:  Negative for chest pain and palpitations.   Gastrointestinal:         As noted in HPI   Endocrine: Negative for cold intolerance, heat intolerance and polyuria.   Genitourinary:  Negative for difficulty urinating, dysuria, flank pain, hematuria and urgency.   Musculoskeletal:  Positive for back pain. Negative for arthralgias, gait problem, joint swelling and myalgias.   Skin:  Negative for rash and wound.   Neurological:  Negative for dizziness, tremors, seizures, speech difficulty, light-headedness, numbness and headaches.   Hematological:  Negative for adenopathy. Does not bruise/bleed easily.   Psychiatric/Behavioral:  Negative for agitation, behavioral problems and confusion. The patient is not nervous/anxious.         Past Medical History:   Diagnosis Date    Carcinoid tumor of colon     Carcinoid tumor of rectum     Colon polyp     CPAP (continuous positive airway pressure) dependence     Diabetes mellitus (HCC)     Diabetic eye exam (HCC) 2020    Hyperlipidemia     Hypernatremia     Hypertension     Iron deficiency     Liver hemangioma     Myasthenia gravis (HCC)     Nephropathy     Retinopathy     Scoliosis 2000    Sleep apnea     Urinary tract infection 2003      Past Surgical History:   Procedure Laterality Date    BACK SURGERY  01/01/2001    lump removed     COLONOSCOPY      1/1/12,1/1/2016 tumor removed     CYST  REMOVAL  2024    from back of neck    DENTAL SURGERY      FL RETROGRADE PYELOGRAM  2020    HERNIA REPAIR  2021    NASAL POLYP EXCISION      DE COLONOSCOPY FLX DX W/COLLJ SPEC WHEN PFRMD N/A 2016    Procedure: COLONOSCOPY;  Surgeon: Williams Gonzales MD;  Location: [facility];  Service: Gastroenterology    DE CYSTO BLADDER W/URETERAL CATHETERIZATION Bilateral 2020    Procedure: CYSTOSCOPY WITH RETROGRADE PYELOGRAM;  Surgeon: Amor Lloyd MD;  Location: [facility];  Service: Urology    DE CYSTO W/REMOVAL OF LESIONS SMALL N/A 2020    Procedure: TRANSURETHRAL RESECTION OF BLADDER TUMOR (TURBT);  Surgeon: Amor Lloyd MD;  Location[facility];  Service: Urology    DE RPR UMBILICAL HRNA 5 YRS/> REDUCIBLE N/A 2021    Procedure: REPAIR HERNIA UMBILICAL WITH MESH;  Surgeon: Walt Coates MD;  Location: [facility];  Service: General    TRANSURETHRAL RESECTION OF PROSTATE       Social History     Socioeconomic History    Marital status: /Civil Union     Spouse name: Not on file    Number of children: Not on file    Years of education: Not on file    Highest education level: Not on file   Occupational History    Not on file   Tobacco Use    Smoking status: Never    Smokeless tobacco: Never   Vaping Use    Vaping status: Never Used   Substance and Sexual Activity    Alcohol use: Yes     Alcohol/week: 3.0 standard drinks of alcohol     Types: 2 Glasses of wine, 1 Cans of beer per week     Comment: 1 per week    Drug use: Never    Sexual activity: Not Currently     Partners: Female     Birth control/protection: Other     Comment: Tubes tied   Other Topics Concern    Not on file   Social History Narrative    Most recent tobacco use screenin2019           Do you currently or have you served in the [employer]:   No          Were you activated, into active duty, as a member of the [employer] or as a Reservist:   No           Advance directive:   No           Chewing tobacco:   none          Alcohol intake:   Moderate          1 a week          Marital status:             Live alone or with others:   with others           Exercise level:   Occasional          General stress level:   Medium          Diet:   Regular          Caffeine intake:   Moderate           Guns present in home:   Yes           Seat belts used routinely:   Yes           Sunscreen used routinely:   Yes           Illicit drugs:   no          Smoke alarm in home:   Yes          Are there stairs in your home:   Yes           Pets:   Yes      4-cats     Per linda     Social Drivers of Health     Financial Resource Strain: Not on file   Food Insecurity: Not on file   Transportation Needs: Not on file   Physical Activity: Not on file   Stress: Not on file   Social Connections: Not on file   Intimate Partner Violence: Not on file   Housing Stability: Not on file     Family History   Problem Relation Age of Onset    Diabetes Mother     Cerebral aneurysm Mother     Heart disease Father     Heart failure Father     Prostate cancer Father     Alcohol abuse Father     No Known Problems Sister      Patient has no known allergies.  Current Outpatient Medications   Medication Sig Dispense Refill    amLODIPine (NORVASC) 10 mg tablet TAKE 1 TABLET BY MOUTH EVERY DAY 90 tablet 1    Ascorbic Acid (VITAMIN C) 500 MG CAPS Take 1 tablet by mouth daily      aspirin 81 MG tablet Take 81 mg by mouth daily.      atorvastatin (LIPITOR) 40 mg tablet TAKE 1 TABLET BY MOUTH EVERYDAY AT BEDTIME 90 tablet 2    B Complex Vitamins (VITAMIN B COMPLEX PO) Take by mouth      B-D UF III MINI PEN NEEDLES 31G X 5 MM MISC Use as prescribed 360 each 3    bisacodyl (DULCOLAX) 5 mg EC tablet Take 2 tablets (10 mg total) by mouth once for 1 dose 2 tablet 0    Cholecalciferol 50 MCG (2000 UT) CAPS Take 2 tablets by mouth 2 (two) times a day Once daily      Cinnamon 500 MG TABS Take by mouth 2 (two) times a day       coenzyme Q-10 100 MG  "capsule Take 2 capsules by mouth daily      Continuous Glucose  (Dexcom G7 ) KAI USE 1 EACH CONTINUOUS 1 each 0    Continuous Glucose Sensor (Dexcom G7 Sensor) Use 1 Device every 10 days 9 each 0    Cyanocobalamin (Vitamin B-12) 2500 MCG SUBL Place under the tongue      Ferrous Sulfate (Iron) 325 (65 Fe) MG TABS Take by mouth 2 (two) times a day      GARLIC PO Take by mouth      Icosapent Ethyl (Vascepa) 1 g CAPS Take 2 capsules (2 g total) by mouth 2 (two) times a day 360 capsule 1    insulin degludec (Tresiba FlexTouch) 200 units/mL CONCENTRATED U-200 injection pen INJECT 50 UNITS UNDER THE SKIN EVERY MORNING 18 mL 1    Jardiance 10 MG TABS tablet TAKE 1 TABLET BY MOUTH EVERY DAY 90 tablet 1    loratadine (CLARITIN) 10 mg tablet Take 10 mg by mouth daily      metFORMIN (GLUCOPHAGE-XR) 500 mg 24 hr tablet TAKE 2 TABLETS WITH BREAKFAST AND 2 TABLETS WITH DINNER 360 tablet 1    metoprolol succinate (TOPROL-XL) 100 mg 24 hr tablet TAKE 1 TABLET BY MOUTH EVERY DAY 90 tablet 3    Multiple Vitamin (MULTI-VITAMIN DAILY) TABS Take 1 tablet by mouth daily      olmesartan (BENICAR) 40 mg tablet TAKE 1 TABLET BY MOUTH EVERY DAY 90 tablet 1    polyethylene glycol (GOLYTELY) 4000 mL solution Take 4,000 mL by mouth once for 1 dose 4000 mL 0    pyridostigmine (MESTINON) 60 mg tablet Take 60 mg by mouth 4 (four) times a day      semaglutide, 0.25 or 0.5 mg/dose, (Ozempic, 0.25 or 0.5 MG/DOSE,) 2 mg/3 mL injection pen Use 0.25mg weekly for 4 weeks then 0.5mg weekly 6 mL 1    triamterene-hydrochlorothiazide (MAXZIDE-25) 37.5-25 mg per tablet TAKE 1 TABLET BY MOUTH EVERY  tablet 1    TURMERIC PO Take by mouth      zinc gluconate 50 mg tablet Take 50 mg by mouth daily       No current facility-administered medications for this visit.       Blood pressure 148/88, pulse 99, height 5' 6\" (1.676 m), weight 83.7 kg (184 lb 9.6 oz), SpO2 98%.    PHYSICAL EXAM: Physical Exam  Constitutional:       Appearance: He is " well-developed.   HENT:      Head: Normocephalic and atraumatic.   Eyes:      General: No scleral icterus.        Right eye: No discharge.         Left eye: No discharge.      Conjunctiva/sclera: Conjunctivae normal.      Pupils: Pupils are equal, round, and reactive to light.   Neck:      Thyroid: No thyromegaly.      Vascular: No JVD.      Trachea: No tracheal deviation.   Cardiovascular:      Rate and Rhythm: Normal rate and regular rhythm.      Heart sounds: Normal heart sounds. No murmur heard.     No friction rub. No gallop.   Pulmonary:      Effort: Pulmonary effort is normal. No respiratory distress.      Breath sounds: Normal breath sounds. No wheezing or rales.   Chest:      Chest wall: No tenderness.   Abdominal:      General: Bowel sounds are normal. There is no distension.      Palpations: Abdomen is soft. There is no mass.      Tenderness: There is no abdominal tenderness. There is no guarding or rebound.      Hernia: No hernia is present.   Musculoskeletal:      Cervical back: Neck supple.   Lymphadenopathy:      Cervical: No cervical adenopathy.   Skin:     General: Skin is warm and dry.      Findings: No erythema or rash.   Neurological:      Mental Status: He is alert and oriented to person, place, and time.   Psychiatric:         Behavior: Behavior normal.         Thought Content: Thought content normal.          Lab Results   Component Value Date    WBC 5.34 08/10/2023    HGB 14.7 08/10/2023    HCT 46.0 08/10/2023    MCV 87 08/10/2023     08/10/2023     Lab Results   Component Value Date    CALCIUM 9.4 08/10/2023    K 3.9 08/10/2023    CO2 32 08/10/2023     (H) 08/10/2023    BUN 14 08/10/2023    CREATININE 1.27 08/10/2023     Lab Results   Component Value Date    ALT 21 10/06/2023    AST 24 10/06/2023    ALKPHOS 53 10/06/2023     Lab Results   Component Value Date    INR 1.06 06/04/2020    PROTIME 13.2 06/04/2020       US abdomen complete  Result Date: 11/7/2023  Impression: 1.  Cholelithiasis and biliary sludge without sonographic evidence of acute cholecystitis. No biliary duct dilation. 2. Multiple gallbladder polyps as described above, no additional follow-up needed. 3. Multiple benign-appearing right-sided renal cysts, no additional follow-up needed. Resident: CHINO MCCURDY I, the attending radiologist, have reviewed the images and agree with the final report above. Workstation performed: [id number]

## 2024-11-18 NOTE — ASSESSMENT & PLAN NOTE
Patient reports having only occasional episodes of uncontrollable loose bowel movements with regular normal bowel movements most of the time.  Rule out secondary to autonomic neuropathy or diet related.  Rule out pancreatic insufficiency.  Rule out celiac disease.  Unlikely to be IBS.  Patient had rectal small carcinoid removed years ago but his diarrhea is not all the time.    -Explained to patient and his wife in detail about different possible etiologies and given reassurance.    -Check stool for pancreatic elastase, total fat    -Check celiac disease panel    -Advised to maintain a diary of the foods that he had prior to the episodes    -Try to avoid milk and dairy products completely for some time to see the response    -Schedule for both EGD and colonoscopy.    -May try probiotics    -High-fiber diet.    -Patient was given instructions about the colonoscopy prep.    -Patient was explained about the risks and benefits of the procedure. Risks including but not limited to bleeding, infection, perforation were explained in detail. Also explained about less than 100% sensitivity with the exam and other alternatives.

## 2024-11-18 NOTE — PATIENT INSTRUCTIONS
Scheduled date of EGD/colonoscopy (as of today): 11/25/24  Physician performing EGD/colonoscopy: Dr. Gonzales  Location of EGD/colonoscopy: [facility]  Desired bowel prep reviewed with patient: [email or web address]  Instructions reviewed with patient by: deondre  Clearances:  kristine

## 2024-11-19 ENCOUNTER — RESULTS FOLLOW-UP (OUTPATIENT)
Dept: GASTROENTEROLOGY | Facility: AMBULARY SURGERY CENTER | Age: 64
End: 2024-11-19

## 2024-11-19 ENCOUNTER — APPOINTMENT (OUTPATIENT)
Dept: LAB | Facility: AMBULARY SURGERY CENTER | Age: 64
End: 2024-11-19
Payer: COMMERCIAL

## 2024-11-19 DIAGNOSIS — R19.7 DIARRHEA, UNSPECIFIED TYPE: ICD-10-CM

## 2024-11-19 PROCEDURE — 82705 FATS/LIPIDS FECES QUAL: CPT

## 2024-11-19 PROCEDURE — 82653 EL-1 FECAL QUANTITATIVE: CPT

## 2024-11-20 ENCOUNTER — RESULTS FOLLOW-UP (OUTPATIENT)
Dept: GASTROENTEROLOGY | Facility: AMBULARY SURGERY CENTER | Age: 64
End: 2024-11-20

## 2024-11-20 LAB — ELASTASE PANC STL-MCNT: >800 UG/G

## 2024-11-21 LAB
FAT STL QL: NORMAL
NEUTRAL FAT STL QL: NORMAL

## 2024-11-25 ENCOUNTER — HOSPITAL ENCOUNTER (OUTPATIENT)
Dept: GASTROENTEROLOGY | Facility: AMBULARY SURGERY CENTER | Age: 64
Setting detail: OUTPATIENT SURGERY
Discharge: HOME | End: 2024-11-25
Attending: INTERNAL MEDICINE
Payer: COMMERCIAL

## 2024-11-25 ENCOUNTER — ANESTHESIA (OUTPATIENT)
Dept: GASTROENTEROLOGY | Facility: AMBULARY SURGERY CENTER | Age: 64
End: 2024-11-25
Payer: COMMERCIAL

## 2024-11-25 VITALS
HEIGHT: 66 IN | SYSTOLIC BLOOD PRESSURE: 148 MMHG | WEIGHT: 180 LBS | OXYGEN SATURATION: 96 % | DIASTOLIC BLOOD PRESSURE: 83 MMHG | TEMPERATURE: 98.6 F | HEART RATE: 92 BPM | RESPIRATION RATE: 18 BRPM | BODY MASS INDEX: 28.93 KG/M2

## 2024-11-25 DIAGNOSIS — R19.7 DIARRHEA, UNSPECIFIED TYPE: ICD-10-CM

## 2024-11-25 LAB — GLUCOSE SERPL-MCNC: 89 MG/DL (ref 65–140)

## 2024-11-25 PROCEDURE — 88305 TISSUE EXAM BY PATHOLOGIST: CPT | Performed by: PATHOLOGY

## 2024-11-25 PROCEDURE — 82948 REAGENT STRIP/BLOOD GLUCOSE: CPT

## 2024-11-25 PROCEDURE — 43235 EGD DIAGNOSTIC BRUSH WASH: CPT | Performed by: INTERNAL MEDICINE

## 2024-11-25 PROCEDURE — 45378 DIAGNOSTIC COLONOSCOPY: CPT | Performed by: INTERNAL MEDICINE

## 2024-11-25 RX ORDER — PROPOFOL 10 MG/ML
INJECTION, EMULSION INTRAVENOUS AS NEEDED
Status: DISCONTINUED | OUTPATIENT
Start: 2024-11-25 | End: 2024-11-25

## 2024-11-25 RX ORDER — SODIUM CHLORIDE, SODIUM LACTATE, POTASSIUM CHLORIDE, CALCIUM CHLORIDE 600; 310; 30; 20 MG/100ML; MG/100ML; MG/100ML; MG/100ML
INJECTION, SOLUTION INTRAVENOUS CONTINUOUS PRN
Status: DISCONTINUED | OUTPATIENT
Start: 2024-11-25 | End: 2024-11-25

## 2024-11-25 RX ORDER — SODIUM CHLORIDE, SODIUM LACTATE, POTASSIUM CHLORIDE, CALCIUM CHLORIDE 600; 310; 30; 20 MG/100ML; MG/100ML; MG/100ML; MG/100ML
125 INJECTION, SOLUTION INTRAVENOUS CONTINUOUS
Status: CANCELLED | OUTPATIENT
Start: 2024-11-25

## 2024-11-25 RX ORDER — LIDOCAINE HYDROCHLORIDE 10 MG/ML
INJECTION, SOLUTION EPIDURAL; INFILTRATION; INTRACAUDAL; PERINEURAL AS NEEDED
Status: DISCONTINUED | OUTPATIENT
Start: 2024-11-25 | End: 2024-11-25

## 2024-11-25 RX ORDER — CHOLESTYRAMINE 4 G/9G
1 POWDER, FOR SUSPENSION ORAL 3 TIMES DAILY PRN
Qty: 60 PACKET | Refills: 2 | Status: SHIPPED | OUTPATIENT
Start: 2024-11-25

## 2024-11-25 RX ADMIN — PROPOFOL 100 MG: 10 INJECTION, EMULSION INTRAVENOUS at 09:27

## 2024-11-25 RX ADMIN — PROPOFOL 100 MG: 10 INJECTION, EMULSION INTRAVENOUS at 09:29

## 2024-11-25 RX ADMIN — LIDOCAINE HYDROCHLORIDE 100 MG: 10 INJECTION, SOLUTION EPIDURAL; INFILTRATION; INTRACAUDAL; PERINEURAL at 09:27

## 2024-11-25 RX ADMIN — PROPOFOL 100 MG: 10 INJECTION, EMULSION INTRAVENOUS at 09:35

## 2024-11-25 RX ADMIN — SODIUM CHLORIDE, SODIUM LACTATE, POTASSIUM CHLORIDE, AND CALCIUM CHLORIDE: .6; .31; .03; .02 INJECTION, SOLUTION INTRAVENOUS at 08:47

## 2024-11-25 NOTE — ANESTHESIA PREPROCEDURE EVALUATION
Procedure:  COLONOSCOPY  EGD    Relevant Problems   CARDIO   (+) Essential hypertension      ENDO   (+) Type 2 diabetes mellitus without complication, without long-term current use of insulin (HCC)      /RENAL   (+) BPH with obstruction/lower urinary tract symptoms   (+) Stage 2 chronic kidney disease      MUSCULOSKELETAL   (+) Diastasis of rectus abdominis   (+) Myasthenia gravis (HCC)      PULMONARY   (+) JERO (obstructive sleep apnea)        Physical Exam    Airway    Mallampati score: II  TM Distance: >3 FB  Neck ROM: full     Dental   No notable dental hx     Cardiovascular  Cardiovascular exam normal    Pulmonary  Pulmonary exam normal     Other Findings        Anesthesia Plan  ASA Score- 3     Anesthesia Type- IV sedation with anesthesia with ASA Monitors.         Additional Monitors:     Airway Plan:            Plan Factors-Exercise tolerance (METS): >4 METS.    Chart reviewed.   Existing labs reviewed. Patient summary reviewed.    Patient is not a current smoker.              Induction-     Postoperative Plan-     Perioperative Resuscitation Plan - Level 1 - Full Code.       Informed Consent- Anesthetic plan and risks discussed with patient.  I personally reviewed this patient with the CRNA. Discussed and agreed on the Anesthesia Plan with the CRNA..

## 2024-11-25 NOTE — INTERVAL H&P NOTE
H&P reviewed. After examining the patient I find no changes in the patients condition since the H&P had been written.    Vitals:    11/25/24 0831   BP: 146/69   Pulse: 86   Resp: 19   Temp: 98.6 °F (37 °C)   SpO2: 97%

## 2024-11-25 NOTE — ANESTHESIA POSTPROCEDURE EVALUATION
Post-Op Assessment Note    CV Status:  Stable  Pain Score: 0    Pain management: adequate       Mental Status:  Arousable   Hydration Status:  Stable   PONV Controlled:  None   Airway Patency:  Patent     Post Op Vitals Reviewed: Yes    No anethesia notable event occurred.    Staff: CRNA           Last Filed PACU Vitals:  Vitals Value Taken Time   Temp     Pulse 90    /65    Resp 18    SpO2 100        Modified Addis:  Activity: 2 (11/25/2024  8:30 AM)  Respiration: 2 (11/25/2024  8:30 AM)  Circulation: 2 (11/25/2024  8:30 AM)  Consciousness: 2 (11/25/2024  8:30 AM)  Oxygen Saturation: 2 (11/25/2024  8:30 AM)  Modified Addis Score: 10 (11/25/2024  8:30 AM)

## 2024-11-27 DIAGNOSIS — E11.9 TYPE 2 DIABETES MELLITUS WITHOUT COMPLICATION, WITHOUT LONG-TERM CURRENT USE OF INSULIN (HCC): ICD-10-CM

## 2024-11-29 PROCEDURE — 88305 TISSUE EXAM BY PATHOLOGIST: CPT | Performed by: PATHOLOGY

## 2024-11-29 RX ORDER — ACYCLOVIR 400 MG/1
1 TABLET ORAL
Qty: 9 EACH | Refills: 1 | Status: SHIPPED | OUTPATIENT
Start: 2024-11-29

## 2024-12-03 ENCOUNTER — RESULTS FOLLOW-UP (OUTPATIENT)
Dept: GASTROENTEROLOGY | Facility: AMBULARY SURGERY CENTER | Age: 64
End: 2024-12-03

## 2024-12-12 ENCOUNTER — OFFICE VISIT (OUTPATIENT)
Dept: SLEEP CENTER | Facility: CLINIC | Age: 64
End: 2024-12-12
Payer: COMMERCIAL

## 2024-12-12 VITALS
DIASTOLIC BLOOD PRESSURE: 78 MMHG | HEIGHT: 66 IN | WEIGHT: 183.8 LBS | BODY MASS INDEX: 29.54 KG/M2 | SYSTOLIC BLOOD PRESSURE: 138 MMHG

## 2024-12-12 DIAGNOSIS — I10 ESSENTIAL HYPERTENSION: ICD-10-CM

## 2024-12-12 DIAGNOSIS — G70.00 MYASTHENIA GRAVIS (HCC): ICD-10-CM

## 2024-12-12 DIAGNOSIS — G47.33 OSA (OBSTRUCTIVE SLEEP APNEA): Primary | ICD-10-CM

## 2024-12-12 DIAGNOSIS — E66.9 OBESITY (BMI 30-39.9): ICD-10-CM

## 2024-12-12 DIAGNOSIS — R68.2 DRY MOUTH: ICD-10-CM

## 2024-12-12 DIAGNOSIS — I11.9 HYPERTENSIVE HEART DISEASE WITHOUT HEART FAILURE: ICD-10-CM

## 2024-12-12 PROCEDURE — 99214 OFFICE O/P EST MOD 30 MIN: CPT | Performed by: INTERNAL MEDICINE

## 2024-12-12 NOTE — PROGRESS NOTES
[facility] - [facility]   Garry Koch  64 y.o. male  :1960  MRN:[id number]  DOS:2024    CC: I saw this patient for follow-up in clinic today for Sleep disordered breathing, Coexisting Sleep and Medical Problems.  He is using a ResMed machine.. Interval changes: None reported.      Results of prior studies in 2014: The diagnostic study demonstrated an RDI of 10 per hour, considerably higher during REM at 19 per hour. Minimum oxygen saturation was 87%. During the subsequent therapeutic study, sleep disordered breathing was adequately remediated with nasal CPAP at 11 cm H2O     PFSH, Problem List, Medications & Allergies were reviewed in EMR.   He  has a past medical history of Carcinoid tumor of colon, Carcinoid tumor of rectum, Colon polyp, CPAP (continuous positive airway pressure) dependence, Diabetes mellitus (), Diabetic eye exam (HCC) (), Hyperlipidemia, Hypernatremia, Hypertension, Iron deficiency, Liver hemangioma, Myasthenia gravis (), Nephropathy, Retinopathy, Scoliosis (), Sleep apnea, and Urinary tract infection ().    He has a current medication list which includes the following prescription(s): amlodipine, vitamin c, aspirin, atorvastatin, b complex vitamins, b-d uf iii mini pen needles, cholecalciferol, cholestyramine, cinnamon, coenzyme q-10, dexcom g7 , dexcom g7 sensor, vitamin b-12, iron, garlic, icosapent ethyl, tresiba flextouch, jardiance, loratadine, metformin, metoprolol succinate, multi-vitamin daily, olmesartan, pyridostigmine, semaglutide (0.25 or 0.5 mg/dose), triamterene-hydrochlorothiazide, turmeric, zinc gluconate, bisacodyl, and polyethylene glycol.    PHYSIOLOGICAL DATA REVIEW : Using PAP > 4 hours/night 83%. Estimated ANA 0.7/hour with pressure of 12.6cm H2O@90th/95th percentile; use is limited by  .  INTERPRETATION: Compliance is Very good; Pressure setting is:within target range; ;   SUBJECTIVE: With respect to use of PAP,  "Garry  is experiencing minimal adverse effects: dry mouth/throat.He derives benefit..  Feels satisfied with sleep and daytime function.   Sleep Routine: Garry reports getting (Patient-Rptd) (P) 7 hrs sleep; he has no difficulty initiating or maintaining sleep . He arises needing an alarm and usually feels refreshed.Garry]denies excessive daytime sleepiness, except in the evenings and occasionally dozes off when sedentary at home.  He rated himself at Total score: (Patient-Rptd) (P) 9 /24 on the [place] Sleepiness Scale.   Other issues: None reported.     Habits: Reports that he has never smoked. He has never used smokeless tobacco.,  Reports current alcohol use of about 3.0 standard drinks of alcohol per week.,  Reports no history of drug use., Caffeine use:moderate until  next 6pm; Exercise routine: irregular.      ROS: Significant for weight has been stable.  Nasal congestion for which she uses Claritin.  Diabetes is well-controlled.  He is on medication for myasthenia gravis.  Review of systems was otherwise negative..    EXAM: /78   Ht 5' 6\" (1.676 m)   Wt 83.4 kg (183 lb 12.8 oz)   BMI 29.67 kg/m²     Wt Readings from Last 3 Encounters:   12/12/24 83.4 kg (183 lb 12.8 oz)   11/25/24 81.6 kg (180 lb)   11/18/24 83.7 kg (184 lb 9.6 oz)      Patient is well groomed; well appearing.   CNS: Alert, orientated, speech clear & coherent  Psych: cooperative and in no distress. Mental state: Appears normal.  H&N: EOMI; NC/AT: No facial pressure marks, no rashes.    Skin/Extrem: col & hydration normal; no edema  Resp: Respiratory effort is normal  Physical findings otherwise essentially unchanged from previous.    IMPRESSION: Problem List Items & Comorbidities Addressed this Visit    1. JERO (obstructive sleep apnea)  PAP DME Resupply/Reorder      2. Dry mouth        3. Myasthenia gravis (HCC)        4. Essential hypertension        5. Hypertensive heart disease without heart failure        6. Obesity (BMI " "30-39.9)            PLAN:  I reviewed results of prior studies and physiologic data with the patient.   I discussed treatment options with risks and benefits.  Treatment with  PAP is medically necessary and Garry is agreable to continue use.   Care of equipment, methods to improve comfort using PAP and importance of compliance with therapy were discussed.  Pressure setting: continue 11-13 cmH2O using nasal pillows.    Rx provided to replace supplies and Care coordinated with DME provider.   He will check with DME provider when he will be eligible for a replacement machine and will call a few months prior to his next visit for a prescription  Discussed strategies for weight reduction.    Follow-up is advised in 1 year or sooner if needed to monitor progress, compliance and to adjust therapy.     Thank you for allowing me to participate in the care of this patient.    Sincerely,     Authenticated electronically on 12/12/24   Board Certified Specialist     Portions of the record may have been created with voice recognition software. Occasional wrong word or \"sound a like\" substitutions may have occurred due to the inherent limitations of voice recognition software. There may also be notations and random deletions of words or characters from malfunctioning software. Read the chart carefully and recognize, using context, where substitutions/deletions have occurred.    "

## 2024-12-13 ENCOUNTER — TELEPHONE (OUTPATIENT)
Dept: SLEEP CENTER | Facility: CLINIC | Age: 64
End: 2024-12-13

## 2024-12-13 DIAGNOSIS — E11.9 DIABETES MELLITUS WITHOUT COMPLICATION (HCC): ICD-10-CM

## 2024-12-16 RX ORDER — ICOSAPENT ETHYL 1 G/1
2 CAPSULE ORAL 2 TIMES DAILY
Qty: 360 CAPSULE | Refills: 1 | Status: SHIPPED | OUTPATIENT
Start: 2024-12-16

## 2024-12-27 LAB

## 2025-01-06 PROBLEM — Z71.2 ENCOUNTER TO DISCUSS TEST RESULTS: Status: RESOLVED | Noted: 2022-09-06 | Resolved: 2025-01-06

## 2025-01-06 PROBLEM — R19.7 DIARRHEA: Status: RESOLVED | Noted: 2024-11-18 | Resolved: 2025-01-06

## 2025-01-06 PROBLEM — R19.8 CHANGE IN BOWEL MOVEMENT: Status: RESOLVED | Noted: 2024-07-08 | Resolved: 2025-01-06

## 2025-01-06 NOTE — ASSESSMENT & PLAN NOTE
Lab Results   Component Value Date    HGBA1C 7.7 (H) 01/07/2025   Followed by endo hga1c higher than usual pt to see endo in 2 week s

## 2025-01-06 NOTE — PROGRESS NOTES
Name: Garry Koch      : 1960      MRN: [id number]  Encounter Provider: Sejal Adrian MD  Encounter Date: 2025   Encounter department: [facility] MEDICINE  :  Assessment & Plan  Essential hypertension  Well controlled on current therapy continue with current medications and will reassess next visit         Type 2 diabetes mellitus without complication, without long-term current use of insulin (HCC)    Lab Results   Component Value Date    HGBA1C 7.7 (H) 2025   Followed by endo hga1c higher than usual pt to see endo in 2 week s       Dyslipidemia  Due for lipid panel       JERO (obstructive sleep apnea)  Recently saw sleep Dr note reviewed        Myasthenia gravis (HCC)  Neuro note reviewed on mestinon       Stage 2 chronic kidney disease  Lab Results   Component Value Date    EGFR 87 2025    EGFR 59 08/10/2023    EGFR 70 2023    CREATININE 0.92 2025    CREATININE 1.27 08/10/2023    CREATININE 1.11 2023          BPH with obstruction/lower urinary tract symptoms  Dr arevalo following pt        Cystitis glandularis  Cystoscopy done 10/1/24 ok        Ear discomfort, right    Orders:  •  Ambulatory Referral to Otolaryngology; Future           History of Present Illness     Pt is here for interval visit and evaluation of multiple medical problems, review of medications, labs, Health Maintenance and any recent specialty consults : endo GI neuropt overdue with labs ordered         Review of Systems   Constitutional:  Negative for appetite change, chills, fatigue and fever.   HENT:          Right ear clogged    Respiratory:  Negative for cough, chest tightness and shortness of breath.    Cardiovascular:  Negative for chest pain, palpitations and leg swelling.   Gastrointestinal:  Negative for abdominal pain, constipation, diarrhea, nausea and vomiting.   Genitourinary:  Negative for difficulty urinating and frequency.   Musculoskeletal:  Negative for  "arthralgias, back pain, gait problem and neck pain.   Skin:  Negative for rash.   Neurological:  Negative for dizziness, weakness, light-headedness, numbness and headaches.   Hematological:  Does not bruise/bleed easily.   Psychiatric/Behavioral:  Negative for dysphoric mood and sleep disturbance. The patient is not nervous/anxious.        Objective   /78 (BP Location: Left arm, Patient Position: Sitting, Cuff Size: Standard)   Pulse 96   Temp 97.8 °F (36.6 °C) (Tympanic)   Resp 16   Ht 5' 6\" (1.676 m)   Wt 83 kg (183 lb)   SpO2 98%   BMI 29.54 kg/m²      Physical Exam  Vitals and nursing note reviewed.   Constitutional:       General: He is not in acute distress.     Appearance: Normal appearance. He is well-developed.   HENT:      Right Ear: Ear canal and external ear normal. There is no impacted cerumen.      Left Ear: Tympanic membrane, ear canal and external ear normal.      Ears:      Comments: Right TM  no light reflex possible fluid behind ear   Eyes:      Conjunctiva/sclera: Conjunctivae normal.      Pupils: Pupils are equal, round, and reactive to light.   Neck:      Thyroid: No thyromegaly.   Cardiovascular:      Rate and Rhythm: Normal rate and regular rhythm.      Pulses: Normal pulses. no weak pulses.           Dorsalis pedis pulses are 2+ on the right side and 2+ on the left side.      Heart sounds: Normal heart sounds. No murmur heard.     No friction rub.   Pulmonary:      Effort: Pulmonary effort is normal. No respiratory distress.      Breath sounds: Normal breath sounds. No wheezing.   Abdominal:      General: Bowel sounds are normal. There is no distension.      Palpations: Abdomen is soft. There is no mass.      Tenderness: There is no abdominal tenderness. There is no guarding.      Hernia: No hernia is present.   Musculoskeletal:      Cervical back: Normal range of motion and neck supple.      Right lower leg: No edema.      Left lower leg: No edema.   Feet:      Right foot:      " Skin integrity: No ulcer, skin breakdown, erythema, warmth, callus or dry skin.      Left foot:      Skin integrity: No ulcer, skin breakdown, erythema, warmth, callus or dry skin.   Lymphadenopathy:      Cervical: No cervical adenopathy.   Skin:     General: Skin is warm and dry.      Findings: No erythema or rash.   Neurological:      General: No focal deficit present.      Mental Status: He is alert and oriented to person, place, and time.      Cranial Nerves: No cranial nerve deficit.      Motor: No weakness.      Gait: Gait normal.      Deep Tendon Reflexes: Reflexes normal.   Psychiatric:         Mood and Affect: Mood normal.     Patient's shoes and socks removed.    Right Foot/Ankle   Right Foot Inspection  Skin Exam: skin normal. Skin not intact, no dry skin, no warmth, no callus, no erythema, no maceration, no abnormal color, no pre-ulcer, no ulcer and no callus.     Toe Exam: ROM and strength within normal limits. No swelling, no tenderness, erythema and  no right toe deformity    Sensory   Vibration: intact  Proprioception: intact  Monofilament testing: intact    Vascular  The right DP pulse is 2+.     Right Toe  - Comprehensive Exam  Ecchymosis: none  Swelling: none   Tenderness: none       Left Foot/Ankle  Left Foot Inspection  Skin Exam: skin normal. Skin not intact, no dry skin, no warmth, no erythema, no maceration, normal color, no pre-ulcer, no ulcer and no callus.     Toe Exam: No swelling, no tenderness, no erythema and no left toe deformity.     Sensory   Vibration: intact  Proprioception: intact  Monofilament testing: intact    Vascular  The left DP pulse is 2+.     Left Toe  - Comprehensive Exam  Ecchymosis: none  Swelling: none   Tenderness: none       Assign Risk Category  No deformity present  No loss of protective sensation  No weak pulses  Risk: 0

## 2025-01-06 NOTE — ASSESSMENT & PLAN NOTE
Lab Results   Component Value Date    EGFR 87 01/07/2025    EGFR 59 08/10/2023    EGFR 70 01/04/2023    CREATININE 0.92 01/07/2025    CREATININE 1.27 08/10/2023    CREATININE 1.11 01/04/2023

## 2025-01-07 ENCOUNTER — APPOINTMENT (OUTPATIENT)
Dept: LAB | Facility: AMBULARY SURGERY CENTER | Age: 65
End: 2025-01-07
Payer: COMMERCIAL

## 2025-01-07 DIAGNOSIS — E11.9 TYPE 2 DIABETES MELLITUS WITHOUT COMPLICATION, WITHOUT LONG-TERM CURRENT USE OF INSULIN (HCC): ICD-10-CM

## 2025-01-07 DIAGNOSIS — Z00.00 ANNUAL PHYSICAL EXAM: ICD-10-CM

## 2025-01-07 LAB
ALBUMIN SERPL BCG-MCNC: 4.4 G/DL (ref 3.5–5)
ALP SERPL-CCNC: 54 U/L (ref 34–104)
ALT SERPL W P-5'-P-CCNC: 22 U/L (ref 7–52)
ANION GAP SERPL CALCULATED.3IONS-SCNC: 9 MMOL/L (ref 4–13)
AST SERPL W P-5'-P-CCNC: 22 U/L (ref 13–39)
BASOPHILS # BLD AUTO: 0.04 THOUSANDS/ΜL (ref 0–0.1)
BASOPHILS NFR BLD AUTO: 1 % (ref 0–1)
BILIRUB SERPL-MCNC: 1 MG/DL (ref 0.2–1)
BUN SERPL-MCNC: 16 MG/DL (ref 5–25)
CALCIUM SERPL-MCNC: 9.5 MG/DL (ref 8.4–10.2)
CHLORIDE SERPL-SCNC: 101 MMOL/L (ref 96–108)
CHOLEST SERPL-MCNC: 110 MG/DL (ref ?–200)
CO2 SERPL-SCNC: 33 MMOL/L (ref 21–32)
CREAT SERPL-MCNC: 0.92 MG/DL (ref 0.6–1.3)
EOSINOPHIL # BLD AUTO: 0.19 THOUSAND/ΜL (ref 0–0.61)
EOSINOPHIL NFR BLD AUTO: 4 % (ref 0–6)
ERYTHROCYTE [DISTWIDTH] IN BLOOD BY AUTOMATED COUNT: 13.3 % (ref 11.6–15.1)
EST. AVERAGE GLUCOSE BLD GHB EST-MCNC: 174 MG/DL
GFR SERPL CREATININE-BSD FRML MDRD: 87 ML/MIN/1.73SQ M
GLUCOSE P FAST SERPL-MCNC: 99 MG/DL (ref 65–99)
HBA1C MFR BLD: 7.7 %
HCT VFR BLD AUTO: 43.6 % (ref 36.5–49.3)
HDLC SERPL-MCNC: 41 MG/DL
HGB BLD-MCNC: 14.1 G/DL (ref 12–17)
IMM GRANULOCYTES # BLD AUTO: 0.02 THOUSAND/UL (ref 0–0.2)
IMM GRANULOCYTES NFR BLD AUTO: 0 % (ref 0–2)
LDLC SERPL CALC-MCNC: 43 MG/DL (ref 0–100)
LYMPHOCYTES # BLD AUTO: 1.53 THOUSANDS/ΜL (ref 0.6–4.47)
LYMPHOCYTES NFR BLD AUTO: 28 % (ref 14–44)
MCH RBC QN AUTO: 27.8 PG (ref 26.8–34.3)
MCHC RBC AUTO-ENTMCNC: 32.3 G/DL (ref 31.4–37.4)
MCV RBC AUTO: 86 FL (ref 82–98)
MONOCYTES # BLD AUTO: 0.56 THOUSAND/ΜL (ref 0.17–1.22)
MONOCYTES NFR BLD AUTO: 10 % (ref 4–12)
NEUTROPHILS # BLD AUTO: 3.16 THOUSANDS/ΜL (ref 1.85–7.62)
NEUTS SEG NFR BLD AUTO: 57 % (ref 43–75)
NONHDLC SERPL-MCNC: 69 MG/DL
NRBC BLD AUTO-RTO: 0 /100 WBCS
PLATELET # BLD AUTO: 243 THOUSANDS/UL (ref 149–390)
PMV BLD AUTO: 10.5 FL (ref 8.9–12.7)
POTASSIUM SERPL-SCNC: 3.4 MMOL/L (ref 3.5–5.3)
PROT SERPL-MCNC: 6.9 G/DL (ref 6.4–8.4)
PSA SERPL-MCNC: 2.78 NG/ML (ref 0–4)
RBC # BLD AUTO: 5.07 MILLION/UL (ref 3.88–5.62)
SODIUM SERPL-SCNC: 143 MMOL/L (ref 135–147)
TRIGL SERPL-MCNC: 132 MG/DL (ref ?–150)
WBC # BLD AUTO: 5.5 THOUSAND/UL (ref 4.31–10.16)

## 2025-01-07 PROCEDURE — G0103 PSA SCREENING: HCPCS

## 2025-01-07 PROCEDURE — 83036 HEMOGLOBIN GLYCOSYLATED A1C: CPT

## 2025-01-07 PROCEDURE — 80061 LIPID PANEL: CPT

## 2025-01-07 PROCEDURE — 36415 COLL VENOUS BLD VENIPUNCTURE: CPT

## 2025-01-07 PROCEDURE — 85025 COMPLETE CBC W/AUTO DIFF WBC: CPT

## 2025-01-07 PROCEDURE — 80053 COMPREHEN METABOLIC PANEL: CPT

## 2025-01-09 ENCOUNTER — RESULTS FOLLOW-UP (OUTPATIENT)
Dept: ENDOCRINOLOGY | Facility: CLINIC | Age: 65
End: 2025-01-09

## 2025-01-12 DIAGNOSIS — E11.9 TYPE 2 DIABETES MELLITUS WITHOUT COMPLICATION, WITHOUT LONG-TERM CURRENT USE OF INSULIN (HCC): ICD-10-CM

## 2025-01-13 ENCOUNTER — OFFICE VISIT (OUTPATIENT)
Dept: FAMILY MEDICINE CLINIC | Facility: CLINIC | Age: 65
End: 2025-01-13
Payer: COMMERCIAL

## 2025-01-13 VITALS
DIASTOLIC BLOOD PRESSURE: 78 MMHG | OXYGEN SATURATION: 98 % | TEMPERATURE: 97.8 F | RESPIRATION RATE: 16 BRPM | WEIGHT: 183 LBS | SYSTOLIC BLOOD PRESSURE: 132 MMHG | HEIGHT: 66 IN | BODY MASS INDEX: 29.41 KG/M2 | HEART RATE: 96 BPM

## 2025-01-13 DIAGNOSIS — H92.01 EAR DISCOMFORT, RIGHT: ICD-10-CM

## 2025-01-13 DIAGNOSIS — E78.5 DYSLIPIDEMIA: ICD-10-CM

## 2025-01-13 DIAGNOSIS — I10 ESSENTIAL HYPERTENSION: Primary | ICD-10-CM

## 2025-01-13 DIAGNOSIS — G47.33 OSA (OBSTRUCTIVE SLEEP APNEA): ICD-10-CM

## 2025-01-13 DIAGNOSIS — E11.9 TYPE 2 DIABETES MELLITUS WITHOUT COMPLICATION, WITHOUT LONG-TERM CURRENT USE OF INSULIN (HCC): ICD-10-CM

## 2025-01-13 DIAGNOSIS — N30.80 CYSTITIS GLANDULARIS: ICD-10-CM

## 2025-01-13 DIAGNOSIS — N40.1 BPH WITH OBSTRUCTION/LOWER URINARY TRACT SYMPTOMS: ICD-10-CM

## 2025-01-13 DIAGNOSIS — N18.2 STAGE 2 CHRONIC KIDNEY DISEASE: ICD-10-CM

## 2025-01-13 DIAGNOSIS — G70.00 MYASTHENIA GRAVIS (HCC): ICD-10-CM

## 2025-01-13 DIAGNOSIS — N13.8 BPH WITH OBSTRUCTION/LOWER URINARY TRACT SYMPTOMS: ICD-10-CM

## 2025-01-13 PROBLEM — M79.604 PAIN IN BOTH LOWER EXTREMITIES: Status: RESOLVED | Noted: 2021-02-22 | Resolved: 2025-01-13

## 2025-01-13 PROBLEM — M79.605 PAIN IN BOTH LOWER EXTREMITIES: Status: RESOLVED | Noted: 2021-02-22 | Resolved: 2025-01-13

## 2025-01-13 PROCEDURE — 99214 OFFICE O/P EST MOD 30 MIN: CPT | Performed by: FAMILY MEDICINE

## 2025-01-23 LAB

## 2025-01-27 ENCOUNTER — OFFICE VISIT (OUTPATIENT)
Dept: ENDOCRINOLOGY | Facility: CLINIC | Age: 65
End: 2025-01-27
Payer: COMMERCIAL

## 2025-01-27 VITALS
TEMPERATURE: 97.6 F | BODY MASS INDEX: 29.32 KG/M2 | SYSTOLIC BLOOD PRESSURE: 132 MMHG | OXYGEN SATURATION: 98 % | WEIGHT: 182.4 LBS | HEART RATE: 91 BPM | DIASTOLIC BLOOD PRESSURE: 70 MMHG | HEIGHT: 66 IN

## 2025-01-27 DIAGNOSIS — E11.65 INADEQUATELY CONTROLLED DIABETES MELLITUS (HCC): ICD-10-CM

## 2025-01-27 DIAGNOSIS — E11.9 TYPE 2 DIABETES MELLITUS WITHOUT COMPLICATION, WITHOUT LONG-TERM CURRENT USE OF INSULIN (HCC): Primary | ICD-10-CM

## 2025-01-27 DIAGNOSIS — E55.9 VITAMIN D DEFICIENCY: ICD-10-CM

## 2025-01-27 DIAGNOSIS — G70.00 MYASTHENIA GRAVIS (HCC): ICD-10-CM

## 2025-01-27 DIAGNOSIS — E78.5 DYSLIPIDEMIA: ICD-10-CM

## 2025-01-27 PROCEDURE — 95251 CONT GLUC MNTR ANALYSIS I&R: CPT | Performed by: INTERNAL MEDICINE

## 2025-01-27 PROCEDURE — 99214 OFFICE O/P EST MOD 30 MIN: CPT | Performed by: INTERNAL MEDICINE

## 2025-01-27 RX ORDER — INSULIN DEGLUDEC 200 U/ML
50 INJECTION, SOLUTION SUBCUTANEOUS EVERY MORNING
Qty: 24 ML | Refills: 1 | Status: SHIPPED | OUTPATIENT
Start: 2025-01-27

## 2025-01-27 RX ORDER — METFORMIN HYDROCHLORIDE 500 MG/1
TABLET, EXTENDED RELEASE ORAL
Qty: 360 TABLET | Refills: 1 | Status: SHIPPED | OUTPATIENT
Start: 2025-01-27

## 2025-01-27 RX ORDER — EMPAGLIFLOZIN 10 MG/1
10 TABLET, FILM COATED ORAL DAILY
Qty: 90 TABLET | Refills: 1 | Status: SHIPPED | OUTPATIENT
Start: 2025-01-27

## 2025-01-27 RX ORDER — ACYCLOVIR 400 MG/1
1 TABLET ORAL
Qty: 9 EACH | Refills: 1 | Status: SHIPPED | OUTPATIENT
Start: 2025-01-27

## 2025-01-27 NOTE — PROGRESS NOTES
"    Follow-up Patient Progress Note      CC: Type 2 diabetes     History of Present Illness:   63-year-old male with type 2 diabetes, HTN, HLD, myasthenia gravis 2014 diagnosed clinically and on mestinon by Dr. Dalton/neurology, Hx of rectal carcinoid tumor s/p resection 2011, Hx pancreatitis, cholelithiasis, JERO, obesity BMI 31, CKD 2 eGFR 60 and vit d deficiency. Last visit was 10/2/24.     Since last visit, weight is same.     CGM data review::  Device: dexcom  Dates:  1/13/25-1/27/25             Usage: 95.3 %   Av glu: 158 mg/dL                   SD:  mg/dL            CV: 35  %            GMI:7.1  %  TIR: 67 %        TAR: 27+5 %               TBR: 0 %  Glycemic patters:  mld postprandial hyperglycemia  Hypoglycemia: No     Current meds:  Tresiba 50 units nightly  Ozempic 0.5mg weekly  Jardiance 10 mg daily  Metformin 1000 mg p.o. twice daily     Opthamology: Yes  Podiatry: No  vaccination:   Dental:  Pancreatitis: no     Ace/ARB: Olmesartan 40 Mg daily  Statin:Lipitor 40  Thyroid issues: No     Physical Exam:  Body mass index is 29.44 kg/m².  /70 (BP Location: Left arm, Patient Position: Sitting, Cuff Size: Adult)   Pulse 91   Temp 97.6 °F (36.4 °C) (Tympanic)   Ht 5' 6\" (1.676 m)   Wt 82.7 kg (182 lb 6.4 oz)   SpO2 98%   BMI 29.44 kg/m²    Vitals:    01/27/25 1101   Weight: 82.7 kg (182 lb 6.4 oz)        Physical Exam  Constitutional:       General: He is not in acute distress.     Appearance: He is well-developed.   HENT:      Head: Normocephalic and atraumatic.      Nose: Nose normal.   Eyes:      Conjunctiva/sclera: Conjunctivae normal.   Pulmonary:      Effort: Pulmonary effort is normal.   Abdominal:      General: There is no distension.   Musculoskeletal:      Cervical back: Normal range of motion and neck supple.   Skin:     Findings: No rash.      Comments: No icterus   Neurological:      Mental Status: He is alert and oriented to person, place, and time.       Labs:   Lab Results "   Component Value Date    HGBA1C 7.7 (H) 01/07/2025       Lab Results   Component Value Date    [id number] 0.974 04/19/2024       Lab Results   Component Value Date    CREATININE 0.92 01/07/2025    CREATININE 1.27 08/10/2023    CREATININE 1.11 01/04/2023    BUN 16 01/07/2025    K 3.4 (L) 01/07/2025     01/07/2025    CO2 33 (H) 01/07/2025     eGFR   Date Value Ref Range Status   01/07/2025 87 ml/min/1.73sq m Final       Lab Results   Component Value Date    ALT 22 01/07/2025    AST 22 01/07/2025    ALKPHOS 54 01/07/2025       Lab Results   Component Value Date    CHOLESTEROL 110 01/07/2025    CHOLESTEROL 122 08/10/2023    CHOLESTEROL 99 01/04/2023     Lab Results   Component Value Date    HDL 41 01/07/2025    HDL 49 08/10/2023    HDL 44 01/04/2023     Lab Results   Component Value Date    TRIG 132 01/07/2025    TRIG 148 08/10/2023    TRIG 128 01/04/2023     Lab Results   Component Value Date    NONHDLC 69 01/07/2025    NONHDLC 73 08/10/2023    NONHDLC 55 01/04/2023       Assessment/Plan:    1. Type 2 diabetes mellitus without complication, without long-term current use of insulin ([facility])  Assessment & Plan:  He is reasonably controlled. He reports significant stress due to spouse having a CVA. AGP showed mostly pp hyperglycemia.    Today we agreed to continue Tresiba 50 units nightly, Jardiance 10 mg daily, Metformin 1000 mg p.o. twice daily and increase Ozempic 1mg weekly.  Advised to use cgm for monitoring hypoglycemia and modifying behaviors.    Follow up in 3 months.    Lab Results   Component Value Date    HGBA1C 7.7 (H) 01/07/2025     Orders:  -     semaglutide, 1 mg/dose, (Ozempic, 1 MG/DOSE,) 4 mg/3 mL injection pen; Inject 0.75 mL (1 mg total) under the skin every 7 days  -     metFORMIN (GLUCOPHAGE-XR) 500 mg 24 hr tablet; Take 2 tablets with breakfast and 2 tablets with dinner  -     Jardiance 10 MG TABS tablet; Take 1 tablet (10 mg total) by mouth daily  -     Continuous Glucose Sensor (Dexcom G7  Sensor); Use 1 Device every 10 days  -     insulin degludec (Tresiba FlexTouch) 200 units/mL CONCENTRATED U-200 injection pen; Inject 50 Units under the skin every morning  2. Dyslipidemia  Assessment & Plan:  Continue lipitor 40mg qhs.  3. Vitamin D deficiency  4. Inadequately controlled diabetes mellitus (HCC)  5. Myasthenia gravis (HCC)  Assessment & Plan:  On pyridostigmine.  He will be prone to some insulin resistance         I have spent a total time of  minutes on 01/27/25 in caring for this patient including greater than 50% of this time was spent in counseling/coordination of care as listed above.       Discussed with the patient and all questioned fully answered. He will contact me with concerns.    Alden Fernandez

## 2025-01-27 NOTE — ASSESSMENT & PLAN NOTE
He is reasonably controlled. He reports significant stress due to spouse having a CVA. AGP showed mostly pp hyperglycemia.    Today we agreed to continue Tresiba 50 units nightly, Jardiance 10 mg daily, Metformin 1000 mg p.o. twice daily and increase Ozempic 1mg weekly.  Advised to use cgm for monitoring hypoglycemia and modifying behaviors.    Follow up in 3 months.    Lab Results   Component Value Date    HGBA1C 7.7 (H) 01/07/2025

## 2025-01-29 DIAGNOSIS — Z00.6 ENCOUNTER FOR EXAMINATION FOR NORMAL COMPARISON OR CONTROL IN CLINICAL RESEARCH PROGRAM: ICD-10-CM

## 2025-01-30 DIAGNOSIS — I10 ESSENTIAL HYPERTENSION: ICD-10-CM

## 2025-01-30 RX ORDER — TRIAMTERENE AND HYDROCHLOROTHIAZIDE 37.5; 25 MG/1; MG/1
1 TABLET ORAL DAILY
Qty: 90 TABLET | Refills: 2 | Status: SHIPPED | OUTPATIENT
Start: 2025-01-30

## 2025-01-31 ENCOUNTER — APPOINTMENT (OUTPATIENT)
Dept: LAB | Facility: AMBULARY SURGERY CENTER | Age: 65
End: 2025-01-31

## 2025-01-31 DIAGNOSIS — Z00.6 ENCOUNTER FOR EXAMINATION FOR NORMAL COMPARISON OR CONTROL IN CLINICAL RESEARCH PROGRAM: ICD-10-CM

## 2025-01-31 PROCEDURE — 36415 COLL VENOUS BLD VENIPUNCTURE: CPT

## 2025-02-13 LAB
APOB+LDLR+PCSK9 GENE MUT ANL BLD/T: NOT DETECTED
BRCA1+BRCA2 DEL+DUP + FULL MUT ANL BLD/T: NOT DETECTED
MLH1+MSH2+MSH6+PMS2 GN DEL+DUP+FUL M: NOT DETECTED

## 2025-03-13 DIAGNOSIS — I10 ESSENTIAL (PRIMARY) HYPERTENSION: ICD-10-CM

## 2025-03-13 RX ORDER — OLMESARTAN MEDOXOMIL 40 MG/1
40 TABLET ORAL DAILY
Qty: 90 TABLET | Refills: 1 | Status: SHIPPED | OUTPATIENT
Start: 2025-03-13

## 2025-03-17 DIAGNOSIS — I10 ESSENTIAL HYPERTENSION: ICD-10-CM

## 2025-03-18 RX ORDER — AMLODIPINE BESYLATE 10 MG/1
10 TABLET ORAL DAILY
Qty: 90 TABLET | Refills: 1 | Status: SHIPPED | OUTPATIENT
Start: 2025-03-18

## 2025-03-18 RX ORDER — METOPROLOL SUCCINATE 100 MG/1
100 TABLET, EXTENDED RELEASE ORAL DAILY
Qty: 90 TABLET | Refills: 3 | Status: SHIPPED | OUTPATIENT
Start: 2025-03-18

## 2025-05-14 ENCOUNTER — OFFICE VISIT (OUTPATIENT)
Dept: ENDOCRINOLOGY | Facility: CLINIC | Age: 65
End: 2025-05-14
Payer: COMMERCIAL

## 2025-05-14 VITALS
HEART RATE: 97 BPM | OXYGEN SATURATION: 98 % | DIASTOLIC BLOOD PRESSURE: 80 MMHG | SYSTOLIC BLOOD PRESSURE: 132 MMHG | BODY MASS INDEX: 28.61 KG/M2 | HEIGHT: 66 IN | TEMPERATURE: 98.3 F | RESPIRATION RATE: 12 BRPM | WEIGHT: 178 LBS

## 2025-05-14 DIAGNOSIS — E78.5 DYSLIPIDEMIA: ICD-10-CM

## 2025-05-14 DIAGNOSIS — E55.9 VITAMIN D DEFICIENCY: ICD-10-CM

## 2025-05-14 DIAGNOSIS — E11.9 TYPE 2 DIABETES MELLITUS WITHOUT COMPLICATION, WITHOUT LONG-TERM CURRENT USE OF INSULIN (HCC): Primary | ICD-10-CM

## 2025-05-14 PROCEDURE — 95251 CONT GLUC MNTR ANALYSIS I&R: CPT | Performed by: INTERNAL MEDICINE

## 2025-05-14 PROCEDURE — 99214 OFFICE O/P EST MOD 30 MIN: CPT | Performed by: INTERNAL MEDICINE

## 2025-05-14 RX ORDER — EMPAGLIFLOZIN 10 MG/1
10 TABLET, FILM COATED ORAL DAILY
Qty: 90 TABLET | Refills: 1 | Status: SHIPPED | OUTPATIENT
Start: 2025-05-14

## 2025-05-14 RX ORDER — METFORMIN HYDROCHLORIDE 500 MG/1
TABLET, EXTENDED RELEASE ORAL
Qty: 360 TABLET | Refills: 1 | Status: SHIPPED | OUTPATIENT
Start: 2025-05-14

## 2025-05-14 RX ORDER — INSULIN DEGLUDEC 200 U/ML
46 INJECTION, SOLUTION SUBCUTANEOUS EVERY MORNING
Qty: 21 ML | Refills: 1 | Status: SHIPPED | OUTPATIENT
Start: 2025-05-14 | End: 2025-05-14

## 2025-05-14 RX ORDER — INSULIN DEGLUDEC 200 U/ML
42 INJECTION, SOLUTION SUBCUTANEOUS EVERY MORNING
Qty: 18 ML | Refills: 1 | Status: SHIPPED | OUTPATIENT
Start: 2025-05-14

## 2025-05-14 NOTE — PROGRESS NOTES
"    Follow-up Patient Progress Note      CC: Type 2 diabetes     History of Present Illness:   63-year-old male with type 2 diabetes, HTN, HLD, myasthenia gravis 2014 diagnosed clinically and on mestinon by Dr. Dalton/neurology, Hx of rectal carcinoid tumor s/p resection 2011, Hx pancreatitis, cholelithiasis, JERO, obesity BMI 31, CKD 2 eGFR 60 and vit d deficiency. Last visit was 1/27/25.     Since last visit, weight is down 4 lbs.     CGM data review::  Device: dexcom  Dates:  4/29/25-5/12/25             Usage: 95 %   Av glu: 141 mg/dL                   SD: 41 mg/dL            CV: 28.7  %            GMI: 6.7  %  TIR: 80 %        TAR: 19+ %               TBR: 1 %  Glycemic patters:  mld postprandial hyperglycemia but excellent control.  Hypoglycemia: No     Current meds:  Tresiba 50 units nightly  Ozempic 1 mg weekly  Jardiance 10 mg daily  Metformin 1000 mg p.o. twice daily     Opthamology: Yes  Podiatry: No  vaccination:   Dental:  Pancreatitis: no     Ace/ARB: Olmesartan 40 Mg daily  Statin:Lipitor 40  Thyroid issues: No      Physical Exam:  Body mass index is 28.73 kg/m².  /80 (BP Location: Left arm, Patient Position: Sitting)   Pulse 97   Temp 98.3 °F (36.8 °C) (Tympanic)   Resp 12   Ht 5' 6\" (1.676 m)   Wt 80.7 kg (178 lb)   SpO2 98%   BMI 28.73 kg/m²    Vitals:    05/14/25 1044   Weight: 80.7 kg (178 lb)        Physical Exam  Constitutional:       General: He is not in acute distress.     Appearance: He is well-developed.   HENT:      Head: Normocephalic and atraumatic.      Nose: Nose normal.     Eyes:      Conjunctiva/sclera: Conjunctivae normal.     Pulmonary:      Effort: Pulmonary effort is normal.   Abdominal:      General: There is no distension.     Musculoskeletal:      Cervical back: Normal range of motion and neck supple.     Skin:     Findings: No rash.      Comments: No icterus     Neurological:      Mental Status: He is alert and oriented to person, place, and time.         Labs: "   Lab Results   Component Value Date    HGBA1C 7.7 (H) 01/07/2025       Lab Results   Component Value Date    [id number] 0.974 04/19/2024       Lab Results   Component Value Date    CREATININE 0.92 01/07/2025    CREATININE 1.27 08/10/2023    CREATININE 1.11 01/04/2023    BUN 16 01/07/2025    K 3.4 (L) 01/07/2025     01/07/2025    CO2 33 (H) 01/07/2025     eGFR   Date Value Ref Range Status   01/07/2025 87 ml/min/1.73sq m Final       Lab Results   Component Value Date    ALT 22 01/07/2025    AST 22 01/07/2025    ALKPHOS 54 01/07/2025       Lab Results   Component Value Date    CHOLESTEROL 110 01/07/2025    CHOLESTEROL 122 08/10/2023    CHOLESTEROL 99 01/04/2023     Lab Results   Component Value Date    HDL 41 01/07/2025    HDL 49 08/10/2023    HDL 44 01/04/2023     Lab Results   Component Value Date    TRIG 132 01/07/2025    TRIG 148 08/10/2023    TRIG 128 01/04/2023     Lab Results   Component Value Date    NONHDLC 69 01/07/2025    NONHDLC 73 08/10/2023    NONHDLC 55 01/04/2023         Assessment/Plan:    1. Type 2 diabetes mellitus without complication, without long-term current use of insulin ([facility])  Assessment & Plan:  He is well controlled but there is concern for fasting hypoglycemia.    Today we agreed to reduce Tresiba 42 units nightly, continue Jardiance 10 mg daily, Metformin 1000 mg p.o. twice daily and increase Ozempic 2mg weekly.  Advised to use cgm for monitoring hypoglycemia and modifying behaviors.    In future, we may continue to wean off insulin as possible.  Send dexcom data in 6 weeks.  Follow up in 3 months.    Lab Results   Component Value Date    HGBA1C 7.7 (H) 01/07/2025     Orders:  -     Jardiance 10 MG TABS tablet; Take 1 tablet (10 mg total) by mouth daily  -     metFORMIN (GLUCOPHAGE-XR) 500 mg 24 hr tablet; Take 2 tablets with breakfast and 2 tablets with dinner  -     semaglutide, 2 mg/dose, (Ozempic, 2 MG/DOSE,) 8 mg/ mL injection pen; Inject 0.75 mL (2 mg total) under the skin  every 7 days  -     insulin degludec (Tresiba FlexTouch) 200 units/mL CONCENTRATED U-200 injection pen; Inject 42 Units under the skin every morning  2. Dyslipidemia  3. Vitamin D deficiency        I have spent a total time of  minutes on 05/14/25 in caring for this patient including greater than 50% of this time was spent in counseling/coordination of care as listed above.       Discussed with the patient and all questioned fully answered. He will contact me with concerns.    Alden Fernandez

## 2025-05-14 NOTE — ASSESSMENT & PLAN NOTE
He is well controlled but there is concern for fasting hypoglycemia.    Today we agreed to reduce Tresiba 42 units nightly, continue Jardiance 10 mg daily, Metformin 1000 mg p.o. twice daily and increase Ozempic 2mg weekly.  Advised to use cgm for monitoring hypoglycemia and modifying behaviors.    In future, we may continue to wean off insulin as possible.  Send dexcom data in 6 weeks.  Follow up in 3 months.    Lab Results   Component Value Date    HGBA1C 7.7 (H) 01/07/2025

## 2025-06-13 DIAGNOSIS — E11.9 DIABETES MELLITUS WITHOUT COMPLICATION (HCC): ICD-10-CM

## 2025-06-13 RX ORDER — ICOSAPENT ETHYL 1 G/1
2 CAPSULE ORAL 2 TIMES DAILY
Qty: 360 CAPSULE | Refills: 1 | Status: SHIPPED | OUTPATIENT
Start: 2025-06-13

## 2025-07-04 DIAGNOSIS — E78.2 MIXED HYPERLIPIDEMIA: ICD-10-CM

## 2025-07-06 NOTE — PROGRESS NOTES
Name: Garry Koch      : 1960      MRN: [id number]  Encounter Provider: Sejal Adrian MD  Encounter Date: 7/10/2025   Encounter department: [facility] MEDICINE  :  Assessment & Plan  Annual physical exam  routine labs done            BPH with obstruction/lower urinary tract symptoms  Urology note reviewed         Dyslipidemia  LDL at goal         Essential hypertension  Well controlled on current therapy continue with current medications and will reassess next visit           Myasthenia gravis ([facility])  Followed by neurology stable         JERO (obstructive sleep apnea)  Ok on cpap         Stage 2 chronic kidney disease  Lab Results   Component Value Date    EGFR 87 2025    EGFR 59 08/10/2023    EGFR 70 2023    CREATININE 0.92 2025    CREATININE 1.27 08/10/2023    CREATININE 1.11 2023   Stable          Type 2 diabetes mellitus without complication, without long-term current use of insulin ([facility])    Lab Results   Component Value Date    HGBA1C 6.8 (A) 07/10/2025   HGA1c due better today 6.8    Orders:    Albumin / creatinine urine ratio; Future    POCT hemoglobin A1c    Vitamin D deficiency  Cont otc 2000 units               History of Present Illness   Patient here for annual physical   Pt is here for interval visit and evaluation of multiple medical problems, review of medications, labs, Health Maintenance and any recent specialty consults              Review of Systems   Constitutional:  Negative for appetite change, chills, fatigue and fever.   Respiratory:  Negative for cough, chest tightness and shortness of breath.    Cardiovascular:  Negative for chest pain, palpitations and leg swelling.   Gastrointestinal:  Negative for abdominal pain, constipation, diarrhea, nausea and vomiting.   Genitourinary:  Negative for difficulty urinating and frequency.   Musculoskeletal:  Negative for arthralgias, back pain, gait problem and neck pain.   Skin:  Negative  "for rash.   Neurological:  Negative for dizziness, weakness, light-headedness, numbness and headaches.   Hematological:  Does not bruise/bleed easily.   Psychiatric/Behavioral:  Negative for dysphoric mood and sleep disturbance. The patient is not nervous/anxious.        Objective   /80 (BP Location: Left arm, Patient Position: Sitting, Cuff Size: Standard)   Pulse 100   Temp 97.8 °F (36.6 °C) (Tympanic)   Resp 16   Ht 5' 6\" (1.676 m)   Wt 80.7 kg (178 lb)   SpO2 96%   BMI 28.73 kg/m²      Physical Exam  Vitals and nursing note reviewed.   Constitutional:       General: He is not in acute distress.     Appearance: Normal appearance. He is well-developed. He is obese.   HENT:      Right Ear: Tympanic membrane and ear canal normal.      Left Ear: Tympanic membrane and ear canal normal.      Mouth/Throat:      Mouth: Mucous membranes are moist.     Eyes:      Extraocular Movements: Extraocular movements intact.      Conjunctiva/sclera: Conjunctivae normal.      Pupils: Pupils are equal, round, and reactive to light.     Neck:      Thyroid: No thyromegaly.      Vascular: No carotid bruit.     Cardiovascular:      Rate and Rhythm: Normal rate and regular rhythm.      Pulses: Normal pulses.      Heart sounds: Normal heart sounds. No murmur heard.  Pulmonary:      Effort: Pulmonary effort is normal. No respiratory distress.      Breath sounds: Normal breath sounds. No wheezing or rales.   Abdominal:      General: Bowel sounds are normal. There is no distension.      Palpations: Abdomen is soft. There is no mass.      Tenderness: There is no abdominal tenderness.      Hernia: No hernia is present.     Musculoskeletal:      Cervical back: Normal range of motion and neck supple.      Right lower leg: No edema.      Left lower leg: No edema.   Lymphadenopathy:      Cervical: No cervical adenopathy.     Skin:     General: Skin is warm and dry.      Capillary Refill: Capillary refill takes less than 2 seconds.      " Findings: No rash.      Comments: No abnormal moles     Neurological:      General: No focal deficit present.      Mental Status: He is alert and oriented to person, place, and time.      Cranial Nerves: No cranial nerve deficit.      Sensory: No sensory deficit.      Motor: No weakness.      Coordination: Coordination normal.      Gait: Gait normal.      Deep Tendon Reflexes: Reflexes normal.     Psychiatric:         Mood and Affect: Mood normal.         Behavior: Behavior normal.         Thought Content: Thought content normal.         Answers submitted by the patient for this visit:  Annual Physical (Submitted on 7/5/2025)  Diet/Nutrition choices: portion control, diabetic diet, limited junk food  Exercise choices: moderate cardiovascular exercise, strength training exercises, 3-4 times a week on average  Sleep choices: 7-8 hours of sleep on average, uses CPAP machine  Hearing choices: normal hearing bilateral ears  Vision choices: most recent eye exam < 1 year ago, wears glasses  Dental choices: regular dental visits, brushes teeth twice daily, floss regularly  Do you currently have an OB/GYN provider that you routinely follow with?: No  Any history of sexual transmitted disease/infection?: No  Urinary symptoms: none  Do you have an advance directive/living will?: No  Do you have a durable power of  (POA)?: No

## 2025-07-06 NOTE — ASSESSMENT & PLAN NOTE
Lab Results   Component Value Date    EGFR 87 01/07/2025    EGFR 59 08/10/2023    EGFR 70 01/04/2023    CREATININE 0.92 01/07/2025    CREATININE 1.27 08/10/2023    CREATININE 1.11 01/04/2023   Stable

## 2025-07-06 NOTE — ASSESSMENT & PLAN NOTE
Lab Results   Component Value Date    HGBA1C 6.8 (A) 07/10/2025   HGA1c due better today 6.8    Orders:    Albumin / creatinine urine ratio; Future    POCT hemoglobin A1c

## 2025-07-07 RX ORDER — ATORVASTATIN CALCIUM 40 MG/1
40 TABLET, FILM COATED ORAL
Qty: 90 TABLET | Refills: 1 | Status: SHIPPED | OUTPATIENT
Start: 2025-07-07

## 2025-07-10 ENCOUNTER — OFFICE VISIT (OUTPATIENT)
Dept: FAMILY MEDICINE CLINIC | Facility: CLINIC | Age: 65
End: 2025-07-10
Payer: COMMERCIAL

## 2025-07-10 VITALS
HEIGHT: 66 IN | SYSTOLIC BLOOD PRESSURE: 122 MMHG | TEMPERATURE: 97.8 F | BODY MASS INDEX: 28.61 KG/M2 | DIASTOLIC BLOOD PRESSURE: 80 MMHG | RESPIRATION RATE: 16 BRPM | WEIGHT: 178 LBS | OXYGEN SATURATION: 96 % | HEART RATE: 100 BPM

## 2025-07-10 DIAGNOSIS — N40.1 BPH WITH OBSTRUCTION/LOWER URINARY TRACT SYMPTOMS: ICD-10-CM

## 2025-07-10 DIAGNOSIS — E55.9 VITAMIN D DEFICIENCY: ICD-10-CM

## 2025-07-10 DIAGNOSIS — E78.5 DYSLIPIDEMIA: ICD-10-CM

## 2025-07-10 DIAGNOSIS — Z00.00 ANNUAL PHYSICAL EXAM: Primary | ICD-10-CM

## 2025-07-10 DIAGNOSIS — E11.9 TYPE 2 DIABETES MELLITUS WITHOUT COMPLICATION, WITHOUT LONG-TERM CURRENT USE OF INSULIN (HCC): ICD-10-CM

## 2025-07-10 DIAGNOSIS — I10 ESSENTIAL HYPERTENSION: ICD-10-CM

## 2025-07-10 DIAGNOSIS — G70.00 MYASTHENIA GRAVIS (HCC): ICD-10-CM

## 2025-07-10 DIAGNOSIS — N18.2 STAGE 2 CHRONIC KIDNEY DISEASE: ICD-10-CM

## 2025-07-10 DIAGNOSIS — N13.8 BPH WITH OBSTRUCTION/LOWER URINARY TRACT SYMPTOMS: ICD-10-CM

## 2025-07-10 DIAGNOSIS — G47.33 OSA (OBSTRUCTIVE SLEEP APNEA): ICD-10-CM

## 2025-07-10 LAB
CREAT UR-MCNC: 61.3 MG/DL
MICROALBUMIN UR-MCNC: 45.7 MG/L
MICROALBUMIN/CREAT 24H UR: 75 MG/G CREATININE (ref 0–30)
SL AMB POCT HEMOGLOBIN AIC: 6.8 (ref ?–6.5)

## 2025-07-10 PROCEDURE — 83036 HEMOGLOBIN GLYCOSYLATED A1C: CPT | Performed by: FAMILY MEDICINE

## 2025-07-10 PROCEDURE — 82570 ASSAY OF URINE CREATININE: CPT | Performed by: FAMILY MEDICINE

## 2025-07-10 PROCEDURE — 82043 UR ALBUMIN QUANTITATIVE: CPT | Performed by: FAMILY MEDICINE

## 2025-07-10 PROCEDURE — 99397 PER PM REEVAL EST PAT 65+ YR: CPT | Performed by: FAMILY MEDICINE

## 2025-07-10 PROCEDURE — 99214 OFFICE O/P EST MOD 30 MIN: CPT | Performed by: FAMILY MEDICINE

## 2025-07-10 RX ORDER — THIAMINE HCL 100 MG
TABLET ORAL
COMMUNITY
End: 2025-07-10

## 2025-07-16 ENCOUNTER — TELEPHONE (OUTPATIENT)
Dept: ENDOCRINOLOGY | Facility: CLINIC | Age: 65
End: 2025-07-16

## 2025-08-16 DIAGNOSIS — E11.9 TYPE 2 DIABETES MELLITUS WITHOUT COMPLICATION, WITHOUT LONG-TERM CURRENT USE OF INSULIN (HCC): ICD-10-CM

## 2025-08-18 RX ORDER — ACYCLOVIR 400 MG/1
1 TABLET ORAL
Qty: 9 EACH | Refills: 1 | Status: SHIPPED | OUTPATIENT
Start: 2025-08-18

## (undated) DEVICE — GLOVE INDICATOR PI UNDERGLOVE SZ 8 BLUE

## (undated) DEVICE — PACK TUR

## (undated) DEVICE — GLOVE SRG BIOGEL ECLIPSE 7.5

## (undated) DEVICE — ADHESIVE SKIN HIGH VISCOSITY EXOFIN 1ML

## (undated) DEVICE — PREMIUM DRY TRAY LF: Brand: MEDLINE INDUSTRIES, INC.

## (undated) DEVICE — SUT ETHIBOND 1 CT-1 30 IN X425H

## (undated) DEVICE — INTENDED FOR TISSUE SEPARATION, AND OTHER PROCEDURES THAT REQUIRE A SHARP SURGICAL BLADE TO PUNCTURE OR CUT.: Brand: BARD-PARKER SAFETY BLADES SIZE 15, STERILE

## (undated) DEVICE — SUT VICRYL 3-0 SH 27 IN J416H

## (undated) DEVICE — STERILE SURGICAL LUBRICANT,  TUBE: Brand: SURGILUBE

## (undated) DEVICE — EVACUATOR BLADDER ELLIK DISP STRL

## (undated) DEVICE — HYDROPHILIC WOUND DRESSING WITH ZINC PLUS VITAMINS A AND B6.: Brand: DERMAGRAN®-B

## (undated) DEVICE — CHLORAPREP HI-LITE 26ML ORANGE

## (undated) DEVICE — PAD GROUNDING ADULT

## (undated) DEVICE — BETHLEHEM UNIVERSAL MINOR GEN: Brand: CARDINAL HEALTH

## (undated) DEVICE — PLUMEPEN PRO 10FT

## (undated) DEVICE — CATH URETERAL 5FR X 70 CM FLEX TIP POLYUR BARD

## (undated) DEVICE — Device: Brand: OLYMPUS

## (undated) DEVICE — UROCATCH BAG

## (undated) DEVICE — BASIC SINGLE BASIN 2-LF: Brand: MEDLINE INDUSTRIES, INC.

## (undated) DEVICE — NEEDLE 22 G X 1 1/2 SAFETY

## (undated) DEVICE — SUT MONOCRYL 4-0 PS-2 27 IN Y426H

## (undated) DEVICE — INVIEW CLEAR LEGGINGS: Brand: CONVERTORS

## (undated) DEVICE — DRAPE EQUIPMENT RF WAND

## (undated) DEVICE — CHLORHEXIDINE 4PCT 4 OZ

## (undated) DEVICE — SPECIMEN CONTAINER STERILE PEEL PACK

## (undated) DEVICE — GAUZE SPONGES,16 PLY: Brand: CURITY

## (undated) DEVICE — GUIDEWIRE STRGHT TIP 0.035 IN  SOLO PLUS